# Patient Record
Sex: FEMALE | Race: WHITE | Employment: FULL TIME | URBAN - METROPOLITAN AREA
[De-identification: names, ages, dates, MRNs, and addresses within clinical notes are randomized per-mention and may not be internally consistent; named-entity substitution may affect disease eponyms.]

---

## 2017-01-02 ENCOUNTER — APPOINTMENT (OUTPATIENT)
Dept: LAB | Facility: HOSPITAL | Age: 72
End: 2017-01-02
Payer: COMMERCIAL

## 2017-01-02 DIAGNOSIS — D37.8: ICD-10-CM

## 2017-01-02 LAB
ATRIAL RATE: 84 BPM
BUN BLD-MCNC: 19 MG/DL (ref 8–20)
CALCIUM BLD-MCNC: 9.8 MG/DL (ref 8.3–10.3)
CHLORIDE: 103 MMOL/L (ref 101–111)
CO2: 28 MMOL/L (ref 22–32)
CREAT BLD-MCNC: 0.79 MG/DL (ref 0.55–1.02)
GLUCOSE BLD-MCNC: 156 MG/DL (ref 70–99)
P AXIS: 54 DEGREES
P-R INTERVAL: 140 MS
POTASSIUM SERPL-SCNC: 4.1 MMOL/L (ref 3.6–5.1)
Q-T INTERVAL: 374 MS
QRS DURATION: 112 MS
QTC CALCULATION (BEZET): 441 MS
R AXIS: -51 DEGREES
SODIUM SERPL-SCNC: 137 MMOL/L (ref 136–144)
T AXIS: 42 DEGREES
VENTRICULAR RATE: 84 BPM

## 2017-01-02 PROCEDURE — 93005 ELECTROCARDIOGRAM TRACING: CPT

## 2017-01-02 PROCEDURE — 93010 ELECTROCARDIOGRAM REPORT: CPT | Performed by: INTERNAL MEDICINE

## 2017-01-02 PROCEDURE — 80048 BASIC METABOLIC PNL TOTAL CA: CPT

## 2017-01-02 PROCEDURE — 36415 COLL VENOUS BLD VENIPUNCTURE: CPT

## 2017-01-18 ENCOUNTER — SURGERY (OUTPATIENT)
Age: 72
End: 2017-01-18

## 2017-01-18 ENCOUNTER — ANESTHESIA EVENT (OUTPATIENT)
Dept: SURGERY | Facility: HOSPITAL | Age: 72
End: 2017-01-18
Payer: COMMERCIAL

## 2017-01-18 ENCOUNTER — ANESTHESIA (OUTPATIENT)
Dept: SURGERY | Facility: HOSPITAL | Age: 72
End: 2017-01-18
Payer: COMMERCIAL

## 2017-01-18 NOTE — ANESTHESIA POSTPROCEDURE EVALUATION
[address] Patient Status:  Hospital Outpatient Surgery   Age/Gender 70year old female MRN [id number]   Location [address] [place] [address] Attending Castillo Blackwell MD   Hosp Day # 0 PCP Tereza Jones MD       An

## 2017-01-18 NOTE — ANESTHESIA PREPROCEDURE EVALUATION
PRE-OP EVALUATION    Patient Name: Ney Hernandez    Pre-op Diagnosis: Mass in rectum [K62.9]    Procedure(s):  TRANSANAL EXCISION OF ANAL CANAL MASS RIGHT YUDELKA LATERAL    Surgeon(s) and Role:     Mansi Hernandez MD - Primary    Pre-op vitals reviewed. psyllium (METAMUCIL SMOOTH TEXTURE) 28 % Oral Powd Pack Take 1 packet by mouth daily. Disp: 30 packet Rfl: 0   magnesium oxide (MAG-OX) 400 MG Oral Tab Take 1 tablet by mouth 2 (two) times daily.  Indications: Low Amount of Magnesium in the Blood Disp: 60 Location: [facility]    INJECTION, W/WO CONTRAST, DX/THERAPEUTIC SUBSTANCE, EPIDURAL/SUBARACHNOID; LUMBAR/SACRAL  3/22/2013    Comment Procedure: CAUDAL;  Surgeon: João Ramos MD;  Location: [address] [place] [address] Procedure: SI JOINT INJECTION;  Surgeon: Barbara Simmons MD;  Location: [facility] FOR PAIN MANAG[facility]    M-SEDAJ BY [facility] [address] .[facility] [address]  [address] [facility] 5+ YR N/A 6/9/2014    Comment Procedure: SI JOINT INJECTION;  Surgeon: Barbara Simmons MD;  Location: [address] [place] [address] Dr RILEY LUMBAR FACET INJECTION OR MEDIAL BRANCH NERVE BLOCK;  Surgeon: Franklin Berman MD;  Location: [address] NEEDLE PLACEMENT Right 7/14/2014    Comment Procedure: LUMBAR FACET INJECTION OR MEDIAL BRANCH NERVE BLOCK

## 2017-02-09 ENCOUNTER — OFFICE VISIT (OUTPATIENT)
Dept: SURGERY | Facility: CLINIC | Age: 72
End: 2017-02-09

## 2017-02-09 VITALS
SYSTOLIC BLOOD PRESSURE: 178 MMHG | TEMPERATURE: 98 F | DIASTOLIC BLOOD PRESSURE: 103 MMHG | HEIGHT: 62 IN | RESPIRATION RATE: 16 BRPM | HEART RATE: 80 BPM | WEIGHT: 213 LBS | BODY MASS INDEX: 39.2 KG/M2

## 2017-02-09 DIAGNOSIS — K64.4 EXTERNAL PROLAPSED HEMORRHOIDS: ICD-10-CM

## 2017-02-09 DIAGNOSIS — K64.2 PROLAPSED INTERNAL HEMORRHOIDS, GRADE 3: Primary | ICD-10-CM

## 2017-02-09 DIAGNOSIS — D37.8: ICD-10-CM

## 2017-02-09 PROCEDURE — 99024 POSTOP FOLLOW-UP VISIT: CPT | Performed by: PHYSICIAN ASSISTANT

## 2017-02-09 NOTE — PROGRESS NOTES
Post Operative Visit Note       Active Problems  1. Prolapsed internal hemorrhoids, grade 3    2. 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  External prolapsed hemorrhoids         Chief Complaint   Post-Op     History of Present Illness   This richard Post-op    • Thoracic or lumbosacral neuritis or radiculitis, unspecified 6/20/2013     Log Date: 06/26/2012    • Disturbance of skin sensation 6/20/2013     Log Date: 06/26/2012    • Degeneration of lumbar or lumbosacral intervertebral disc 6/20/2013 Comment Procedure: CAUDAL;  Surgeon: Maximino Vitale MD;  Location: [facility] FOR PAIN MANAGEMENT    INJECTION, W/WO CONTRAST, DX/THERAPEUTIC SUBSTANCE, EPIDURAL/SUBARACHNOID; LUMBAR/SACRAL  4/17/2013    Comment Procedure: CAUDAL;  Surgeon: Valeriy Flores PAIN MANAGEMENT    INJECT CAMI CARRASCO,ABHIJIT LOPEZH NERV Right 6/23/2014    Comment Procedure: LUMBAR FACET INJECTION OR MEDIAL BRANCH NERVE BLOCK;  Surgeon: Barbara Simmons MD;  Location: [facility] NERV DANILO,HENRIQUE JESSICA NERV Rig CEE;  Surgeon: Alexi Dennis MD;  Location: [facility]  1/18/17    Comment transanal excision of anal canal mass       The family history and social history have been reviewed by me today. History reviewed.  No Pack Take 1 packet by mouth daily. Disp: 30 packet Rfl: 0   magnesium oxide (MAG-OX) 400 MG Oral Tab Take 1 tablet by mouth 2 (two) times daily.  Indications: Low Amount of Magnesium in the Blood Disp: 60 tablet Rfl: 1   aspirin 81 MG Oral Tab Take 81 mg by distension and no mass. There is no tenderness. There is no rebound and no guarding.    Genitourinary:         Examination of the perineum does reveal a 1 inch long Vicryl suture, this was trimmed to the level of the skin, external examination reveals the a healing well there is no obvious signs of bleeding, there is no bruising, no erythema or drainage. Digital rectal exam and anoscopy were deferred due to patient discomfort.     I am happy to report that the patient's pathology revealed an inflammatory poly

## 2017-03-02 ENCOUNTER — OFFICE VISIT (OUTPATIENT)
Dept: SURGERY | Facility: CLINIC | Age: 72
End: 2017-03-02

## 2017-03-02 VITALS
RESPIRATION RATE: 16 BRPM | WEIGHT: 215 LBS | DIASTOLIC BLOOD PRESSURE: 86 MMHG | BODY MASS INDEX: 39.56 KG/M2 | HEIGHT: 62 IN | SYSTOLIC BLOOD PRESSURE: 154 MMHG | HEART RATE: 97 BPM

## 2017-03-02 DIAGNOSIS — K64.4 EXTERNAL PROLAPSED HEMORRHOIDS: Primary | ICD-10-CM

## 2017-03-02 DIAGNOSIS — K62.0 ANAL POLYP: ICD-10-CM

## 2017-03-02 PROCEDURE — 99024 POSTOP FOLLOW-UP VISIT: CPT | Performed by: COLON & RECTAL SURGERY

## 2017-03-02 RX ORDER — TRIAMTERENE AND HYDROCHLOROTHIAZIDE 37.5; 25 MG/1; MG/1
TABLET ORAL
Refills: 0 | COMMUNITY
Start: 2017-02-23 | End: 2019-09-09

## 2017-03-02 RX ORDER — LOSARTAN POTASSIUM 50 MG/1
TABLET ORAL
Refills: 0 | COMMUNITY
Start: 2017-02-09 | End: 2019-09-09

## 2017-03-02 NOTE — PATIENT INSTRUCTIONS
This patient underwent transanal excision of an anal canal mass on January 18, 2017. This was done for a prolapsing large polyp that was associated with rectal bleeding and anal pain.     I am happy to report final pathology reveals the polyp to be a benig

## 2017-03-02 NOTE — PROGRESS NOTES
Follow Up Visit Note       Active Problems      1. External prolapsed hemorrhoids    2.  Anal polyp          Chief Complaint   Patient presents with:  Anal Problem (GI): Further care and treatment of Transanal excision of anal canal mass,  right anterolater Allergies  [place] Seat is allergic to tramadol and vicodin. Past Medical / Surgical / Social / Family History    The past medical and past surgical history have been reviewed by me today.     Past Medical History   Diagnosis Date   • Diabetes ([facility] [address] SURGICAL HISTORY  1993    Comment breast biopsy    OTHER SURGICAL HISTORY      Comment back surgery    INJECTION, W/WO CONTRAST, DX/THERAPEUTIC SUBSTANCE, EPIDURAL/SUBARACHNOID; LUMBAR/SACRAL  3/1/2013    Comment Procedure: CAUDAL;  Surgeon: Camila Kwan Comment open surgical reduction fracture    LIGATION OF HEMORRHOID(S)      Comment x4    INJECTION PROC FOR SACROILIAC JOINT ARTHROGRAPHY &/OR ANESTHETIC/STEROID Right 5/16/2014    Comment Procedure: SI JOINT INJECTION;  Surgeon: Rome Abdi MD;  Beata Trevizo BRANCH NERVE BLOCK;  Surgeon: Ayad Castro MD;  Location: [facility] PAIN [facility]NA[facility]    INJECT NERV BLCK,OTHR PERIPH NERV Right 7/14/2014    Comment Procedure: LUMBAR FACET INJECTION OR MEDIAL BRANCH NERVE BLOCK;  Surgeon: Ayad Castro MD;  [facility] every 4 (four) hours as needed for Pain. Disp: 40 tablet Rfl: 0   Famotidine-Ca Carb-Mag Hydrox (PEPCID COMPLETE OR) Take by mouth daily. Disp:  Rfl:    Naproxen Sodium (ALEVE OR) Take 3 tablets by mouth daily.  Disp:  Rfl:    lidocaine 5 % External Patch P fever, chills, diaphoresis, fatigue and unexpected weight change. HENT: Negative for hearing loss, nosebleeds, sore throat and trouble swallowing. Respiratory: Negative for apnea, cough, shortness of breath and wheezing.     Cardiovascular: Negative fo constipation. The patient has no complaints at the operative site. The patient has no incisional complaints. The patient denies fever or chills. Anal exam including digital rectal exam reveals no palpable nodule or mass lesion.   She remains wit

## 2017-05-20 ENCOUNTER — HOSPITAL ENCOUNTER (OUTPATIENT)
Dept: MAMMOGRAPHY | Age: 72
Discharge: HOME | End: 2017-05-20
Attending: INTERNAL MEDICINE
Payer: COMMERCIAL

## 2017-05-20 ENCOUNTER — HOSPITAL ENCOUNTER (OUTPATIENT)
Dept: BONE DENSITY | Age: 72
Discharge: HOME | End: 2017-05-20
Attending: INTERNAL MEDICINE
Payer: COMMERCIAL

## 2017-05-20 DIAGNOSIS — Z12.31 SCREENING MAMMOGRAM, ENCOUNTER FOR: ICD-10-CM

## 2017-05-20 DIAGNOSIS — Z78.0 MENOPAUSE: ICD-10-CM

## 2017-05-20 PROCEDURE — 77080 DXA BONE DENSITY AXIAL: CPT | Performed by: INTERNAL MEDICINE

## 2017-05-20 PROCEDURE — 77067 SCR MAMMO BI INCL CAD: CPT | Performed by: INTERNAL MEDICINE

## 2017-06-26 ENCOUNTER — HOSPITAL ENCOUNTER (OUTPATIENT)
Dept: GENERAL RADIOLOGY | Facility: HOSPITAL | Age: 72
Discharge: HOME | End: 2017-06-26
Payer: COMMERCIAL

## 2017-06-26 DIAGNOSIS — M79.644 PAIN OF FINGER OF RIGHT HAND: ICD-10-CM

## 2017-06-26 PROCEDURE — 73140 X-RAY EXAM OF FINGER(S): CPT | Performed by: FAMILY MEDICINE

## 2017-07-03 ENCOUNTER — TELEPHONE (OUTPATIENT)
Dept: SURGERY | Facility: CLINIC | Age: 72
End: 2017-07-03

## 2017-07-03 NOTE — TELEPHONE ENCOUNTER
Pt scheduled fup appt 7/6 with Candance Hymen for lumbar back pain-injections;referral from Dr Rianna Lianres endorsed to front office folder in [address] [place] [address]

## 2017-07-03 NOTE — TELEPHONE ENCOUNTER
Referral received from Dr Nahomi Fierro for injections for lumbar back pain,pt had procedures with Dr Андрей Almendarez in 2015-never been seen in the office;referral is on Pain front office desk

## 2017-07-06 ENCOUNTER — OFFICE VISIT (OUTPATIENT)
Dept: SURGERY | Facility: CLINIC | Age: 72
End: 2017-07-06

## 2017-07-06 ENCOUNTER — TELEPHONE (OUTPATIENT)
Dept: SURGERY | Facility: CLINIC | Age: 72
End: 2017-07-06

## 2017-07-06 VITALS
DIASTOLIC BLOOD PRESSURE: 70 MMHG | RESPIRATION RATE: 16 BRPM | BODY MASS INDEX: 41.23 KG/M2 | HEIGHT: 60 IN | HEART RATE: 78 BPM | WEIGHT: 210 LBS | SYSTOLIC BLOOD PRESSURE: 130 MMHG

## 2017-07-06 DIAGNOSIS — M51.36 DDD (DEGENERATIVE DISC DISEASE), LUMBAR: ICD-10-CM

## 2017-07-06 DIAGNOSIS — M96.1 FAILED BACK SURGICAL SYNDROME: ICD-10-CM

## 2017-07-06 DIAGNOSIS — M54.16 LUMBAR RADICULITIS: ICD-10-CM

## 2017-07-06 DIAGNOSIS — M46.1 SACROILIITIS, NOT ELSEWHERE CLASSIFIED (HCC): Primary | ICD-10-CM

## 2017-07-06 DIAGNOSIS — M47.819 SPONDYLOSIS WITHOUT MYELOPATHY: ICD-10-CM

## 2017-07-06 PROCEDURE — 99215 OFFICE O/P EST HI 40 MIN: CPT | Performed by: PHYSICIAN ASSISTANT

## 2017-07-06 NOTE — PATIENT INSTRUCTIONS
Refill policies:    • Allow 2-3 business days for refills; controlled substances may take longer.   • Contact your pharmacy at least 5 days prior to running out of medication and have them send an electronic request or submit request through the [facility] have a procedure or additional testing performed. [facility]) will contact your insurance carrier to obtain pre-certification or prior authorization.     Unfortunately, ALONDRA has seen an increase in denial of payment even though the p procedure. If you require a refill of medications, please contact the office 48 hours prior to your procedure.   • If you have an implanted Spinal Cord or Peripheral Nerve Stimulator: Please remember to turn device off for procedure      Medication:   Numbe questions or concerns before or after your procedure at [phone]. If you are a diabetic, please increase the frequency of your glucose monitoring after the procedure as this may cause a temporary increase in your blood sugar.   Contact your primary car findings from the psychologist and make a determination if you are an appropriate candidate. One of the pain clinic nurses will notify you if you are deemed appropriate for the stimulator.  We will then start the process of obtaining prior authorization f current. The electrodes are placed under x-ray guidance with you lying on your belly. A local anesthetic is used to numb the skin and deeper tissues. An introducer needle is passed into the epidural space.  The electrodes are inserted through the introducer placement. What should I do after the procedure? This procedure is an outpatient procedure. You will need a ride home, plan to take it easy for a day or so after the procedure.  You will be able to do most activities but it is generally advised to [facility] [email or web address].        February 2016

## 2017-07-06 NOTE — PROGRESS NOTES
Name: Denise Sinha   : [birth year]   DOS: 2017     Pain Clinic Follow Up Visit:   Patient presents with: Follow - Up: low back pain       Denise Sinha is a 70year old female who presents for recheck of chronic low back pain.   Patient has had a Hallucinations      Current Outpatient Prescriptions:  Losartan Potassium 50 MG Oral Tab  Disp:  Rfl: 0   Triamterene-HCTZ 37.5-25 MG Oral Tab  Disp:  Rfl: 0   Famotidine-Ca Carb-Mag Hydrox (PEPCID COMPLETE OR) Take by mouth daily.  Disp: span intact.    Inspection:  Ambulates with difficulty uses 4 point rolling walker  Back: +++ tenderness to right SIJ, ++ over right LFJ, + over left LFJ and left SIJ , limited ROM   LE: Severe tenderness over right GTB and right ITB, 5/5 strength bilateral anterolisthesis of L2 with respect to L3   and L4 with respect to L5 as well as retrolisthesis of L1 with respect to L2.  Diffuse disc space desiccation with decreased disc height at the L1-2 and L2-3 level with irregularity of the L2-3 endplates most consi there is referral order for CHIQUI ZARAGOZA. 3. Information regarding SCS provided to patient- she will look into it. This would be the best option for patient to control her pain.  Trial and implantation d/w pt.   4. I recommended f/u with Dr Gorge Ordoñez and pt hilda

## 2017-07-11 ENCOUNTER — TELEPHONE (OUTPATIENT)
Dept: NEUROLOGY | Facility: CLINIC | Age: 72
End: 2017-07-11

## 2017-07-11 NOTE — TELEPHONE ENCOUNTER
Spoke to [employer] at [place], codes 71439 are valid and billable, no prior authorization or predetermination required.  Call reference: [id number] call time 18:07

## 2017-07-25 ENCOUNTER — APPOINTMENT (OUTPATIENT)
Dept: GENERAL RADIOLOGY | Facility: HOSPITAL | Age: 72
End: 2017-07-25
Attending: ANESTHESIOLOGY
Payer: COMMERCIAL

## 2017-07-25 ENCOUNTER — SURGERY (OUTPATIENT)
Age: 72
End: 2017-07-25

## 2017-07-25 ENCOUNTER — HOSPITAL ENCOUNTER (OUTPATIENT)
Facility: HOSPITAL | Age: 72
Setting detail: HOSPITAL OUTPATIENT SURGERY
Discharge: HOME | End: 2017-07-25
Attending: ANESTHESIOLOGY | Admitting: ANESTHESIOLOGY
Payer: COMMERCIAL

## 2017-07-25 VITALS
RESPIRATION RATE: 18 BRPM | TEMPERATURE: 98 F | OXYGEN SATURATION: 99 % | DIASTOLIC BLOOD PRESSURE: 60 MMHG | HEART RATE: 78 BPM | SYSTOLIC BLOOD PRESSURE: 125 MMHG

## 2017-07-25 DIAGNOSIS — M46.1 SACROILIITIS, NOT ELSEWHERE CLASSIFIED (HCC): ICD-10-CM

## 2017-07-25 DIAGNOSIS — M47.819 SPONDYLOSIS WITHOUT MYELOPATHY: ICD-10-CM

## 2017-07-25 LAB — GLUCOSE BLD-MCNC: 130 MG/DL (ref 65–99)

## 2017-07-25 PROCEDURE — 3E0U3BZ INTRODUCTION OF ANESTHETIC AGENT INTO JOINTS, PERCUTANEOUS APPROACH: ICD-10-PCS | Performed by: ANESTHESIOLOGY

## 2017-07-25 PROCEDURE — 77002 NEEDLE LOCALIZATION BY XRAY: CPT | Performed by: ANESTHESIOLOGY

## 2017-07-25 PROCEDURE — 99152 MOD SED SAME PHYS/QHP 5/>YRS: CPT | Performed by: ANESTHESIOLOGY

## 2017-07-25 PROCEDURE — 3E0U33Z INTRODUCTION OF ANTI-INFLAMMATORY INTO JOINTS, PERCUTANEOUS APPROACH: ICD-10-PCS | Performed by: ANESTHESIOLOGY

## 2017-07-25 PROCEDURE — 82962 GLUCOSE BLOOD TEST: CPT

## 2017-07-25 RX ORDER — LIDOCAINE HYDROCHLORIDE 10 MG/ML
INJECTION, SOLUTION EPIDURAL; INFILTRATION; INTRACAUDAL; PERINEURAL AS NEEDED
Status: DISCONTINUED | OUTPATIENT
Start: 2017-07-25 | End: 2017-07-25 | Stop reason: HOSPADM

## 2017-07-25 RX ORDER — MIDAZOLAM HYDROCHLORIDE 1 MG/ML
INJECTION INTRAMUSCULAR; INTRAVENOUS AS NEEDED
Status: DISCONTINUED | OUTPATIENT
Start: 2017-07-25 | End: 2017-07-25 | Stop reason: HOSPADM

## 2017-07-25 RX ORDER — METHYLPREDNISOLONE ACETATE 40 MG/ML
INJECTION, SUSPENSION INTRA-ARTICULAR; INTRALESIONAL; INTRAMUSCULAR; SOFT TISSUE AS NEEDED
Status: DISCONTINUED | OUTPATIENT
Start: 2017-07-25 | End: 2017-07-25 | Stop reason: HOSPADM

## 2017-07-25 RX ORDER — SODIUM CHLORIDE, SODIUM LACTATE, POTASSIUM CHLORIDE, CALCIUM CHLORIDE 600; 310; 30; 20 MG/100ML; MG/100ML; MG/100ML; MG/100ML
100 INJECTION, SOLUTION INTRAVENOUS CONTINUOUS
Status: DISCONTINUED | OUTPATIENT
Start: 2017-07-25 | End: 2017-07-25

## 2017-07-25 NOTE — OPERATIVE REPORT
[facility]  Operative Report  2017     Tati Oneil Patient Status:  Hospital Outpatient Surgery    1945 MRN [id number]   Location [address] [place] [address] Attending Aloysius Klinefelter, MD   Hosp Day # 0 PCP None Pc joint were overlapped. A 22-gauge, Quincke spinal needle was advanced into the middle portion of the corresponding sacroiliac joint.  After the needle  positions were confirmed by AP and lateral views of fluoroscopy, 1 cc Omnipaque-240 was injected into th

## 2017-07-25 NOTE — H&P
History & Physical Examination    Patient Name: Jan Mcgowan  MRN: [id number]  CSN: [id number]  YOB: 1945    Pre-Operative Diagnosis:  [facility] elsewhere [facility] [M46.1]  Spondylosis without myelopathy [M19.90]    Present Taking   SYNTHROID 50 MCG OR TABS 1 TABLET DAILY Disp:  Rfl:  Taking   METFORMIN  MG (MOD) OR TB24 2 tablets at night Disp:  Rfl:  Taking       Current Facility-Administered Medications:  lactated ringers infusion 100 mL/hr Intravenous Continuous Date: 10/10/2011    • Unspecified disorder of thyroid    • Valvular disease     MVP   • Visual impairment     glasses     Past Surgical History:  2010: COLONOSCOPY  1975: D & C  3/22/2013: EPIDUROGRAPHY, RADIOLOGICAL S & I      Comment: Procedure: CAUDAL; Location: [facility]  7/14/2014: INJECT NERV BLCK,OTHR PERIPH NERV Right      Comment: Procedure: LUMBAR FACET INJECTION OR MEDIAL                BRANCH NERVE BLOCK;  Surgeon: Mercedes Roberts MD;  Location: [address] [place] [address]  OF HEMORRHOID(S)      Comment: x4  3/1/2013: NADEEN BY  PHYS [facility] 5+ YR      Comment: Procedure: CAUDAL;  Surgeon: Joelle Villa MD;  Location: [address] [place] [address] MANAGEMENT  3/22/2013: NADEEN BY  PHYS [address] [facility] [address]  [address] 5+ YR 10. 00 Years     Quit date: 1/1/1974    Smokeless tobacco: Never Used    Alcohol use No    Comment: beer rarely/ once a year     There were no vitals taken for this visit.   SYSTEM Check if Review is Normal Check if Physical Exam is Normal If not normal, ple

## 2017-08-31 ENCOUNTER — OFFICE VISIT (OUTPATIENT)
Dept: SURGERY | Facility: CLINIC | Age: 72
End: 2017-08-31

## 2017-08-31 VITALS
RESPIRATION RATE: 20 BRPM | DIASTOLIC BLOOD PRESSURE: 80 MMHG | BODY MASS INDEX: 40.05 KG/M2 | OXYGEN SATURATION: 98 % | WEIGHT: 204 LBS | HEIGHT: 60 IN | SYSTOLIC BLOOD PRESSURE: 142 MMHG | HEART RATE: 63 BPM

## 2017-08-31 DIAGNOSIS — G57.71 COMPLEX REGIONAL PAIN SYNDROME TYPE 2 OF RIGHT LOWER EXTREMITY: ICD-10-CM

## 2017-08-31 DIAGNOSIS — M47.819 SPONDYLOSIS WITHOUT MYELOPATHY: ICD-10-CM

## 2017-08-31 DIAGNOSIS — M46.1 SACROILIITIS, NOT ELSEWHERE CLASSIFIED (HCC): Primary | ICD-10-CM

## 2017-08-31 PROCEDURE — 99213 OFFICE O/P EST LOW 20 MIN: CPT | Performed by: PHYSICIAN ASSISTANT

## 2017-08-31 RX ORDER — PREGABALIN 25 MG/1
25 CAPSULE ORAL 2 TIMES DAILY
Qty: 60 CAPSULE | Refills: 0 | Status: SHIPPED | OUTPATIENT
Start: 2017-08-31 | End: 2017-10-02

## 2017-08-31 NOTE — PATIENT INSTRUCTIONS
Refill policies:    • Allow 2-3 business days for refills; controlled substances may take longer.   • Contact your pharmacy at least 5 days prior to running out of medication and have them send an electronic request or submit request through the [facility] have a procedure or additional testing performed. [facility]) will contact your insurance carrier to obtain pre-certification or prior authorization.     Unfortunately, ALONDRA has seen an increase in denial of payment even though the p antiseptic solution and then the procedure is carried out. The skin is numbed with a local anesthetic. Then X-ray or fluoroscopy is used to guide placement of the introducer needles.  Since nerves cannot actually be seen on x-ray, the introducer needles are which makes the procedure easier to tolerate. It is necessary for you to be awake enough to communicate easily with the physician during the procedure.  However, some patients receive enough sedation that they have amnesia and cannot always remember parts o speaking, this procedure is safe. However, with any procedure there are risks, side effects and the possibility of complications. The risks and complications are dependent upon the sites that are ablated.  Since the introducer needles have to go through ski

## 2017-08-31 NOTE — PROGRESS NOTES
Name: Rowena Girard   :    DOS: 2017     Pain Clinic Follow Up Visit:   Patient presents with: Follow - Up: plan of care      Rowena Girard is a 67year old female who presents for recheck of chronic low back pain.  Pt is S/P CHIQUI gibbs Oral Cap Take 400 Units by mouth daily. Disp:  Rfl:    Cholecalciferol (VITAMIN D) 1000 UNITS Oral Cap Take 1 capsule by mouth daily. Disp:  Rfl:    Cyanocobalamin (VITAMIN B 12 OR) Take 1,000 Units by mouth daily.  Disp:  Rfl:    Multiple Vitamin (MULTI-VI explained to pt that this is diagnostic injection and it's safe to say majority of her pain is coming from the SIJ and she should consider getting RFA done for longer lasting relief.    2. Pt declined at this time and wants to go back to see Dr Keven Gibbons for more

## 2017-10-02 RX ORDER — PREGABALIN 25 MG/1
25 CAPSULE ORAL 2 TIMES DAILY
Qty: 60 CAPSULE | Refills: 0 | Status: SHIPPED
Start: 2017-10-02 | End: 2017-11-03

## 2017-10-02 NOTE — TELEPHONE ENCOUNTER
Medication: Lyrica 25mg    Date of last refill: 8/31/2017  [place] reviewed 3/92/2726    Last office visit: 8/31/2017  Due back to clinic per last office note:  Return for 6 wks post procedure.   Date next office visit sc

## 2017-10-14 ENCOUNTER — HOSPITAL ENCOUNTER (OUTPATIENT)
Dept: MRI IMAGING | Age: 72
Discharge: HOME | End: 2017-10-14
Attending: ORTHOPAEDIC SURGERY
Payer: COMMERCIAL

## 2017-10-14 DIAGNOSIS — M54.50 BACK PAIN AT L4-L5 LEVEL: ICD-10-CM

## 2017-10-14 DIAGNOSIS — M54.50 LOW BACK PAIN: ICD-10-CM

## 2017-10-14 DIAGNOSIS — M54.50 LUMBAGO: ICD-10-CM

## 2017-10-14 PROCEDURE — A9575 INJ GADOTERATE MEGLUMI 0.1ML: HCPCS | Performed by: RADIOLOGY

## 2017-10-14 PROCEDURE — 72158 MRI LUMBAR SPINE W/O & W/DYE: CPT | Performed by: ORTHOPAEDIC SURGERY

## 2017-10-16 ENCOUNTER — OFFICE VISIT (OUTPATIENT)
Dept: ELECTROPHYSIOLOGY | Facility: HOSPITAL | Age: 72
End: 2017-10-16
Attending: ORTHOPAEDIC SURGERY
Payer: COMMERCIAL

## 2017-10-16 DIAGNOSIS — R20.2 PARESTHESIAS IN LEFT HAND: ICD-10-CM

## 2017-10-16 PROCEDURE — 95886 MUSC TEST DONE W/N TEST COMP: CPT | Performed by: OTHER

## 2017-10-16 PROCEDURE — 95910 NRV CNDJ TEST 7-8 STUDIES: CPT | Performed by: OTHER

## 2017-10-17 NOTE — PROCEDURES
[facility] [facility], [address] [place] [address]  [phone]            Patient: Carissa Frye Sex: Female  Patient ID: [id number] YOB: 1945      Visit Date: 10/16/2017 08:04  Patient Age On Visit Sunday B.Elbow ADM 6.09  9.1  94.2 ?115   B. Elbow - Wrist 17 67 ?49      A. Elbow ADM 8.91  9.0  99.7 ?115   A. Elbow - B. Elbow 15 53 ?52             F  Wave      Nerve Fmin    ms    Left Right Ref.    Median - APB 30.00 26.35 ?32.00   Ulnar - ADM 27.81  ?32.00

## 2017-10-30 ENCOUNTER — TELEPHONE (OUTPATIENT)
Dept: SURGERY | Facility: CLINIC | Age: 72
End: 2017-10-30

## 2017-11-03 ENCOUNTER — TELEPHONE (OUTPATIENT)
Dept: SURGERY | Facility: CLINIC | Age: 72
End: 2017-11-03

## 2017-11-03 ENCOUNTER — OFFICE VISIT (OUTPATIENT)
Dept: SURGERY | Facility: CLINIC | Age: 72
End: 2017-11-03

## 2017-11-03 VITALS
DIASTOLIC BLOOD PRESSURE: 68 MMHG | HEIGHT: 60 IN | RESPIRATION RATE: 18 BRPM | OXYGEN SATURATION: 99 % | SYSTOLIC BLOOD PRESSURE: 122 MMHG | WEIGHT: 210 LBS | BODY MASS INDEX: 41.23 KG/M2 | HEART RATE: 85 BPM

## 2017-11-03 DIAGNOSIS — M96.1 FAILED BACK SURGICAL SYNDROME: Primary | ICD-10-CM

## 2017-11-03 DIAGNOSIS — M54.16 LUMBAR RADICULITIS: ICD-10-CM

## 2017-11-03 DIAGNOSIS — G57.71 COMPLEX REGIONAL PAIN SYNDROME TYPE 2 OF RIGHT LOWER EXTREMITY: ICD-10-CM

## 2017-11-03 DIAGNOSIS — M47.16 OSTEOARTHRITIS OF LUMBAR SPINE WITH MYELOPATHY: ICD-10-CM

## 2017-11-03 DIAGNOSIS — M46.1 SACROILIITIS, NOT ELSEWHERE CLASSIFIED (HCC): ICD-10-CM

## 2017-11-03 PROCEDURE — 99215 OFFICE O/P EST HI 40 MIN: CPT | Performed by: PHYSICIAN ASSISTANT

## 2017-11-03 RX ORDER — PREGABALIN 25 MG/1
25 CAPSULE ORAL 2 TIMES DAILY
Qty: 60 CAPSULE | Refills: 0 | Status: SHIPPED
Start: 2017-11-03 | End: 2019-08-16

## 2017-11-03 NOTE — PROGRESS NOTES
Name: Fadi Cancino   :    DOS: 11/3/2017     Pain Clinic Follow Up Visit:   Patient presents with:   Follow - Up: plan of care      Fadi Cancino is a 67year old female who presents for recheck of chronic low back pain and CRPS to R LE sec HPI.      Tramadol                Nausea and vomiting  Vicodin [Hydrocodon*    Nausea and vomiting, Dizziness,                            Hallucinations      Current Outpatient Prescriptions:  pregabalin 25 MG Oral Cap Take 1 capsule (25 mg total) by mouth 18   Ht 60\"   Wt 210 lb   SpO2 99%   BMI 41.01 kg/m²   General:  Patient is a(n) 67year old year old female in no acute distress. Neurologic[de-identified] WNL-Orientation to time, place and person, normal mood & affect, concentration & attention span intact.    Insp

## 2017-11-03 NOTE — PATIENT INSTRUCTIONS
Refill policies:    • Allow 2-3 business days for refills; controlled substances may take longer.   • Contact your pharmacy at least 5 days prior to running out of medication and have them send an electronic request or submit request through the [facility] have a procedure or additional testing performed. [facility]) will contact your insurance carrier to obtain pre-certification or prior authorization.     Unfortunately, ALONDRA has seen an increase in denial of payment even though the p procedure. If you require a refill of medications, please contact the office 48 hours prior to your procedure.   • If you have an implanted Spinal Cord or Peripheral Nerve Stimulator: Please remember to turn device off for procedure      Medication:   Numbe questions or concerns before or after your procedure at [phone]. If you are a diabetic, please increase the frequency of your glucose monitoring after the procedure as this may cause a temporary increase in your blood sugar.   Contact your primary car

## 2017-11-07 ENCOUNTER — MED REC SCAN ONLY (OUTPATIENT)
Dept: SURGERY | Facility: CLINIC | Age: 72
End: 2017-11-07

## 2018-01-02 ENCOUNTER — TELEPHONE (OUTPATIENT)
Dept: SURGERY | Facility: CLINIC | Age: 73
End: 2018-01-02

## 2018-01-02 NOTE — TELEPHONE ENCOUNTER
Spoke to Kevin at [facility] ([phone]) is valid and billable, no precertification or predetermination required.  Call reference #[id number], time on call: 6:44

## 2018-01-02 NOTE — TELEPHONE ENCOUNTER
LM confirming upcoming procedure date 1-4-18 and arrival time 7:00 and review of instructions. Encouraged pt to call office back with any questions. Office number provided.        [address]  PRE-PROCEDURE INSTRUCTIONS WITH IV SEDATION ? 81mg 24 hours  ? Greater than 81 mg (325mg) 7 days  ? Coumadin Procedure may be cancelled if INR is elevated. ? Epidural ____ - 7 days  ? Others 5 days  ? Excedrin (with aspirin) 7 days  ? Plavix (Clopidogrel)  ? Epidural ____ - 10 days  ?  Others 7 day

## 2018-01-03 ENCOUNTER — TELEPHONE (OUTPATIENT)
Dept: SURGERY | Facility: CLINIC | Age: 73
End: 2018-01-03

## 2018-04-23 ENCOUNTER — OFFICE VISIT (OUTPATIENT)
Dept: SURGERY | Facility: CLINIC | Age: 73
End: 2018-04-23

## 2018-04-23 VITALS
HEIGHT: 60 IN | BODY MASS INDEX: 41.23 KG/M2 | SYSTOLIC BLOOD PRESSURE: 148 MMHG | WEIGHT: 210 LBS | DIASTOLIC BLOOD PRESSURE: 90 MMHG | HEART RATE: 85 BPM

## 2018-04-23 DIAGNOSIS — M96.1 FAILED BACK SURGICAL SYNDROME: Primary | ICD-10-CM

## 2018-04-23 DIAGNOSIS — G90.521 COMPLEX REGIONAL PAIN SYNDROME I OF RIGHT LOWER LIMB: ICD-10-CM

## 2018-04-23 DIAGNOSIS — M54.16 LUMBAR RADICULOPATHY: ICD-10-CM

## 2018-04-23 PROCEDURE — 99214 OFFICE O/P EST MOD 30 MIN: CPT | Performed by: NURSE PRACTITIONER

## 2018-04-23 RX ORDER — DILTIAZEM HYDROCHLORIDE 240 MG/1
240 CAPSULE, EXTENDED RELEASE ORAL
COMMUNITY
End: 2019-09-09

## 2018-04-23 NOTE — PROGRESS NOTES
Name: Jodi Garnett   : [birth year]   DOS: 2018     Pain Clinic Follow Up Visit:   Jodi Garnett is a 67year old female who presents for recheck of her chronic low back pain and complex regional pain syndrome right thigh since low back surgery. Disp: 60 capsule Rfl: 0   Losartan Potassium 50 MG Oral Tab  Disp:  Rfl: 0   Triamterene-HCTZ 37.5-25 MG Oral Tab  Disp:  Rfl: 0   Famotidine-Ca Carb-Mag Hydrox (PEPCID COMPLETE OR) Take by mouth daily.  Disp:  Rfl:    Naproxen Sodium (ALEVE OR) Take 3 tabl AND WITHOUT CONTRAST     COMPARISON:  SARINA , CT SPINE LUMBAR (CPT=72131), 6/05/2015, 19:37. RUCHI, MRI SPINE LUMBAR (W+WO) (CPT=72158), 6/10/2015, 19:17.      INDICATIONS:  M54.5 Low back pain M54.5 Low back pain M54.5 Low back pain     TECHNIQUE: involving L1 to ambulate. CORD/CAUDA EQUINA:  Normal caliber, contour, and signal intensity.       =====  CONCLUSION:  Postsurgical changes lumbar spine with L1-L5 laminectomies and L4-L5 and posterior mil and pedicle screw fixation appears stable. above.  I have ordered MRI thoracic spine for further evaluation prior to spinal cord stimulator trial.  We will contact her once we have reviewed MRI results if Dr. Sandra Hernandez recommends that she proceed with spinal cord stimulator trial process.   She agrees wi

## 2018-04-23 NOTE — PATIENT INSTRUCTIONS
Refill policies:    • Allow 2-3 business days for refills; controlled substances may take longer.   • Contact your pharmacy at least 5 days prior to running out of medication and have them send an electronic request or submit request through the “request re entire amount billed. Precertification and Prior Authorizations: If your physician has recommended that you have a procedure or additional testing performed.   [facility]) will contact your insurance carrier to obtain pre-certi

## 2018-04-27 PROBLEM — M96.1 FAILED BACK SURGICAL SYNDROME: Status: ACTIVE | Noted: 2018-04-27

## 2018-08-03 ENCOUNTER — HOSPITAL ENCOUNTER (OUTPATIENT)
Dept: MAMMOGRAPHY | Age: 73
Discharge: HOME | End: 2018-08-03
Attending: INTERNAL MEDICINE
Payer: COMMERCIAL

## 2018-08-03 DIAGNOSIS — Z12.31 SCREENING MAMMOGRAM, ENCOUNTER FOR: ICD-10-CM

## 2018-08-03 PROCEDURE — 77067 SCR MAMMO BI INCL CAD: CPT | Performed by: INTERNAL MEDICINE

## 2018-08-03 PROCEDURE — 77063 BREAST TOMOSYNTHESIS BI: CPT | Performed by: INTERNAL MEDICINE

## 2019-01-01 ENCOUNTER — EXTERNAL RECORD (OUTPATIENT)
Dept: HEALTH INFORMATION MANAGEMENT | Facility: OTHER | Age: 74
End: 2019-01-01

## 2019-01-28 ENCOUNTER — HOSPITAL ENCOUNTER (EMERGENCY)
Facility: HOSPITAL | Age: 74
Discharge: HOME | End: 2019-01-28
Attending: EMERGENCY MEDICINE
Payer: COMMERCIAL

## 2019-01-28 VITALS
OXYGEN SATURATION: 98 % | SYSTOLIC BLOOD PRESSURE: 174 MMHG | HEIGHT: 60 IN | DIASTOLIC BLOOD PRESSURE: 86 MMHG | BODY MASS INDEX: 41.23 KG/M2 | WEIGHT: 210 LBS | HEART RATE: 100 BPM | RESPIRATION RATE: 20 BRPM | TEMPERATURE: 98 F

## 2019-01-28 DIAGNOSIS — M54.50 ACUTE EXACERBATION OF CHRONIC LOW BACK PAIN: Primary | ICD-10-CM

## 2019-01-28 DIAGNOSIS — G89.29 ACUTE EXACERBATION OF CHRONIC LOW BACK PAIN: Primary | ICD-10-CM

## 2019-01-28 PROCEDURE — 96372 THER/PROPH/DIAG INJ SC/IM: CPT

## 2019-01-28 PROCEDURE — 99284 EMERGENCY DEPT VISIT MOD MDM: CPT

## 2019-01-28 RX ORDER — METHYLPREDNISOLONE 4 MG/1
TABLET ORAL
Qty: 1 PACKAGE | Refills: 0 | Status: SHIPPED | OUTPATIENT
Start: 2019-01-28 | End: 2019-02-02

## 2019-01-28 RX ORDER — DEXAMETHASONE SODIUM PHOSPHATE 4 MG/ML
10 VIAL (ML) INJECTION ONCE
Status: COMPLETED | OUTPATIENT
Start: 2019-01-28 | End: 2019-01-28

## 2019-01-28 RX ORDER — KETOROLAC TROMETHAMINE 30 MG/ML
60 INJECTION, SOLUTION INTRAMUSCULAR; INTRAVENOUS ONCE
Status: COMPLETED | OUTPATIENT
Start: 2019-01-28 | End: 2019-01-28

## 2019-01-28 NOTE — ED INITIAL ASSESSMENT (HPI)
69 yo F came today for back pain. States she has chronic back pain 4/10 on pain scale. At 5pm today, she was bending over to put air on tire. Pain got worst. Pain right back radiates to right hip. 10/10 on scale. Denies any numbness or tingling on feet.  Rena Sine

## 2019-01-28 NOTE — ED PROVIDER NOTES
Patient Seen in: [facility] Emergency Department    History   Patient presents with:  Back Pain (musculoskeletal)    Stated Complaint: back pain after bending to put air in her tire    HPI    Patient 66-year-old woman with chronic back pain complain without mention of myelopathy 3/1/2013   • Thoracic or lumbosacral neuritis or radiculitis, unspecified 6/20/2013    Log Date: 06/26/2012    • Type II or unspecified type diabetes mellitus without mention of complication, not stated as uncontrolled    • Un OTHER SURGICAL HISTORY  2011    open surgical reduction fracture   • SACROILIAC JOINT INJECTION RIGHT OR LEFT Right 7/25/2017    Performed by Lili Teresa MD at [facility]   • [facility] LEFT Right 1/8/2015    Performed by Ming Hernandez Normocephalic and atraumatic. Eyes: Conjunctivae are normal. No scleral icterus. Neck: Normal range of motion. Neck supple. Cardiovascular: Normal rate and intact distal pulses. Pulmonary/Chest: Effort normal. No respiratory distress.    Abdominal:

## 2019-02-08 ENCOUNTER — OFFICE VISIT (OUTPATIENT)
Dept: PAIN CLINIC | Facility: CLINIC | Age: 74
End: 2019-02-08
Payer: COMMERCIAL

## 2019-02-08 VITALS — DIASTOLIC BLOOD PRESSURE: 82 MMHG | HEART RATE: 68 BPM | SYSTOLIC BLOOD PRESSURE: 148 MMHG | OXYGEN SATURATION: 97 %

## 2019-02-08 DIAGNOSIS — M46.1 SACROILIITIS (HCC): ICD-10-CM

## 2019-02-08 DIAGNOSIS — M54.16 LUMBAR RADICULITIS: Primary | ICD-10-CM

## 2019-02-08 PROCEDURE — 99214 OFFICE O/P EST MOD 30 MIN: CPT | Performed by: NURSE PRACTITIONER

## 2019-02-08 NOTE — PROGRESS NOTES
Name: David Chapman   :    DOS: 2019     Pain Clinic Follow Up Visit:   David Chapman is a 68year old female who presents for recheck of her chronic low back pain.  On 19 patient was trying to put air in her tire when she felt an \ (VITAMIN B 12 OR) Take 1,000 Units by mouth daily. Disp:  Rfl:    Multiple Vitamin (MULTI-VITAMIN) Oral Tab Take 1 tablet by mouth daily.  Disp:  Rfl:    acetaminophen (TYLENOL EXTRA STRENGTH) 500 MG Oral Tab Take 2 tablets by mouth every 6 (six) hours as n ordered in this encounter       Radiology orders and consultations:MRI SPINE LUMBAR (CPT=72148)    The patient indicates understanding of these issues and agrees to the plan. Return if symptoms worsen or fail to improve.     Eric Posada, APRN, 2/8/2019, 9

## 2019-03-02 ENCOUNTER — HOSPITAL ENCOUNTER (OUTPATIENT)
Dept: MRI IMAGING | Age: 74
Discharge: HOME | End: 2019-03-02
Attending: NURSE PRACTITIONER
Payer: COMMERCIAL

## 2019-03-02 DIAGNOSIS — M54.16 LUMBAR RADICULITIS: ICD-10-CM

## 2019-03-02 PROCEDURE — 72148 MRI LUMBAR SPINE W/O DYE: CPT | Performed by: NURSE PRACTITIONER

## 2019-03-05 ENCOUNTER — TELEPHONE (OUTPATIENT)
Dept: PAIN CLINIC | Facility: CLINIC | Age: 74
End: 2019-03-05

## 2019-03-05 DIAGNOSIS — M54.16 LUMBAR RADICULITIS: ICD-10-CM

## 2019-03-05 DIAGNOSIS — M47.817 LUMBOSACRAL SPONDYLOSIS WITHOUT MYELOPATHY: ICD-10-CM

## 2019-03-05 DIAGNOSIS — M46.1 SACROILIITIS (HCC): Primary | ICD-10-CM

## 2019-03-05 NOTE — TELEPHONE ENCOUNTER
Called patient and left a message to call back. Please relay the message when she calls back. MRI noted with disc extrusion at T12-L1 and Dr. Hiwot Avery recommends a right SIJI followed by a right TLESI at L1-2 if needed.

## 2019-03-05 NOTE — TELEPHONE ENCOUNTER
Spoke to pt to [employer], JANET's msg below. Pt verbalized understanding and stated she would like to move forward w/ recommended injections.

## 2019-03-05 NOTE — TELEPHONE ENCOUNTER
Medical clearance needed- No    Pt's MRI results reviewed and Dr. Jerris Cranker recommended for Right SIJI followed by Right TLESI L1-L2. Please begin PA process for procedure(s).      SIJI  Laterality: Right  Level(s): N/A    TLESI  Laterality: Right  Levels: L1-L

## 2019-03-06 NOTE — TELEPHONE ENCOUNTER
[facility] does not come [facility] with Nilda, no Pa needed for [address]  Call reference number [id number]  Call time 10:01    Patient can be scheduled out

## 2019-03-06 NOTE — TELEPHONE ENCOUNTER
Called patient and scheduled for:    Appointment Date:  3/19/19  Procedure Time:  085 am  Check-in Time:  745 am    Appointment Date:  4/2/19  Procedure Time:  845 am  Check-in Time:  745 am    Pre-procedure instructions including fasting (except blood pre

## 2019-03-14 ENCOUNTER — TELEPHONE (OUTPATIENT)
Dept: PAIN CLINIC | Facility: CLINIC | Age: 74
End: 2019-03-14

## 2019-03-14 NOTE — TELEPHONE ENCOUNTER
Spoke to patient, confirmed procedure date of 3/19/19 and to be checked in at outpatient registration at 7:45 am. Patient called pre-procedure line and understood instructionsPatient instructed to call office if there are additional questions after listeni

## 2019-03-19 ENCOUNTER — APPOINTMENT (OUTPATIENT)
Dept: GENERAL RADIOLOGY | Facility: HOSPITAL | Age: 74
End: 2019-03-19
Attending: ANESTHESIOLOGY
Payer: COMMERCIAL

## 2019-03-19 ENCOUNTER — HOSPITAL ENCOUNTER (OUTPATIENT)
Facility: HOSPITAL | Age: 74
Setting detail: HOSPITAL OUTPATIENT SURGERY
Discharge: HOME | End: 2019-03-19
Attending: ANESTHESIOLOGY | Admitting: ANESTHESIOLOGY
Payer: COMMERCIAL

## 2019-03-19 VITALS
RESPIRATION RATE: 16 BRPM | OXYGEN SATURATION: 94 % | DIASTOLIC BLOOD PRESSURE: 72 MMHG | SYSTOLIC BLOOD PRESSURE: 176 MMHG | HEART RATE: 59 BPM | TEMPERATURE: 97 F

## 2019-03-19 DIAGNOSIS — M47.817 LUMBOSACRAL SPONDYLOSIS WITHOUT MYELOPATHY: ICD-10-CM

## 2019-03-19 DIAGNOSIS — M46.1 SACROILIITIS (HCC): ICD-10-CM

## 2019-03-19 LAB — GLUCOSE BLD-MCNC: 173 MG/DL (ref 70–99)

## 2019-03-19 PROCEDURE — 82962 GLUCOSE BLOOD TEST: CPT

## 2019-03-19 PROCEDURE — 3E0U33Z INTRODUCTION OF ANTI-INFLAMMATORY INTO JOINTS, PERCUTANEOUS APPROACH: ICD-10-PCS | Performed by: ANESTHESIOLOGY

## 2019-03-19 PROCEDURE — 3E0U3KZ INTRODUCTION OF OTHER DIAGNOSTIC SUBSTANCE INTO JOINTS, PERCUTANEOUS APPROACH: ICD-10-PCS | Performed by: ANESTHESIOLOGY

## 2019-03-19 PROCEDURE — 3E0U3BZ INTRODUCTION OF ANESTHETIC AGENT INTO JOINTS, PERCUTANEOUS APPROACH: ICD-10-PCS | Performed by: ANESTHESIOLOGY

## 2019-03-19 RX ORDER — ONDANSETRON 2 MG/ML
4 INJECTION INTRAMUSCULAR; INTRAVENOUS ONCE AS NEEDED
Status: DISCONTINUED | OUTPATIENT
Start: 2019-03-19 | End: 2019-03-19

## 2019-03-19 RX ORDER — SODIUM CHLORIDE, SODIUM LACTATE, POTASSIUM CHLORIDE, CALCIUM CHLORIDE 600; 310; 30; 20 MG/100ML; MG/100ML; MG/100ML; MG/100ML
100 INJECTION, SOLUTION INTRAVENOUS CONTINUOUS
Status: DISCONTINUED | OUTPATIENT
Start: 2019-03-19 | End: 2019-03-19

## 2019-03-19 RX ORDER — OMEGA-3/DHA/EPA/FISH OIL 910-1400MG
CAPSULE ORAL
COMMUNITY

## 2019-03-19 RX ORDER — DEXTROSE MONOHYDRATE 25 G/50ML
50 INJECTION, SOLUTION INTRAVENOUS
Status: DISCONTINUED | OUTPATIENT
Start: 2019-03-19 | End: 2019-03-19

## 2019-03-19 RX ORDER — DIPHENHYDRAMINE HYDROCHLORIDE 50 MG/ML
50 INJECTION INTRAMUSCULAR; INTRAVENOUS ONCE AS NEEDED
Status: DISCONTINUED | OUTPATIENT
Start: 2019-03-19 | End: 2019-03-19

## 2019-03-19 RX ORDER — LIDOCAINE HYDROCHLORIDE 10 MG/ML
INJECTION, SOLUTION EPIDURAL; INFILTRATION; INTRACAUDAL; PERINEURAL AS NEEDED
Status: DISCONTINUED | OUTPATIENT
Start: 2019-03-19 | End: 2019-03-19

## 2019-03-19 RX ORDER — INSULIN ASPART 100 [IU]/ML
3 INJECTION, SOLUTION INTRAVENOUS; SUBCUTANEOUS ONCE
Status: DISCONTINUED | OUTPATIENT
Start: 2019-03-19 | End: 2019-03-19

## 2019-03-19 RX ORDER — METHYLPREDNISOLONE ACETATE 40 MG/ML
INJECTION, SUSPENSION INTRA-ARTICULAR; INTRALESIONAL; INTRAMUSCULAR; SOFT TISSUE AS NEEDED
Status: DISCONTINUED | OUTPATIENT
Start: 2019-03-19 | End: 2019-03-19

## 2019-03-19 NOTE — OPERATIVE REPORT
[facility]  Operative Report  3/19/2019     Siri De Los Santos Patient Status:  Hospital Outpatient Surgery    1945 MRN [id number]   [facility] ENDOSCOPY Attending No att. providers found   Hosp Day # 0 PCP MD Shreya Sanches nice sacroiliac joint arthrogram revealed. After that   methylprednisolone 80 mg with 2 mL of 1% lidocaine was injected. The needle was flushed with 1 mL of 1% lidocaine. The needle was removed with stylet in situ.   The patient tolerated the procedure ve

## 2019-03-19 NOTE — H&P
History & Physical Examination    Patient Name: Gordo Read  MRN: [id number]  CSN: [id number]  YOB: 1945    Pre-Operative Diagnosis:  Sacroiliitis ([facility] [M46.1]  Lumbosacral spondylosis without myelopathy [M47.817]    Present Illness: A 73 Take 1 tablet by mouth 2 (two) times daily. Indications: Low Amount of Magnesium in the Blood Disp: 60 tablet Rfl: 1 Taking   Probiotic Product (PROBIOTIC OR) Take  by mouth daily. Disp:  Rfl:  Taking   Glucosamine 500 MG Oral Tab Take  by mouth daily.  Dis 6/15/2013   • Hypothyroidism    • IBS (irritable bowel syndrome)    • Internal hemorrhoids with other complication 96/4/6623    prolapsed   • Migraine headache    • Muscle weakness    • Neuralgia, neuritis, and radiculitis, unspecified 3/1/2013   • Obese Danika Vivas MD at [address] MICRODISCECTOMY 3 LEVEL Right 6/14/2013    Performed by Gianna Caba MD at [facility] MAIN OR   • KELLY NEEDLE LOCALIZATION W/ SPECIMEN 1 SITE RIGHT     • ORTHOPEDIC SURG (PBP)      lumbar dis have discussed the risks and benefits and alternatives with the patient/family. They understand and agree to proceed with plan of care. [ x ] I have reviewed the History and Physical done within the last 30 days. Any changes noted above.     Farideh Alvarez

## 2019-03-26 ENCOUNTER — TELEPHONE (OUTPATIENT)
Dept: PAIN CLINIC | Facility: CLINIC | Age: 74
End: 2019-03-26

## 2019-03-26 NOTE — TELEPHONE ENCOUNTER
Spoke with patient regarding OR procedure on right SI joint. Patient states prior to procedure pain was 10/10 pain. Post procedure patient states pain went down to 8/10 for a \"few\" days.  Patient states she has another visit with Dr. Tae Godwin for another proc

## 2019-03-28 ENCOUNTER — TELEPHONE (OUTPATIENT)
Dept: SURGERY | Facility: CLINIC | Age: 74
End: 2019-03-28

## 2019-03-28 NOTE — TELEPHONE ENCOUNTER
Left message for patient to call back to confirm procedure date of 4/2/19 with Dr Denver San and to be checked in at outpatient registration at 7:45 am. Patient to be instructed to call pre-procedure line before procedure at [phone].  Patient to be instructe

## 2019-04-02 ENCOUNTER — HOSPITAL ENCOUNTER (OUTPATIENT)
Facility: HOSPITAL | Age: 74
Setting detail: HOSPITAL OUTPATIENT SURGERY
Discharge: HOME | End: 2019-04-02
Attending: ANESTHESIOLOGY | Admitting: ANESTHESIOLOGY
Payer: COMMERCIAL

## 2019-04-02 ENCOUNTER — APPOINTMENT (OUTPATIENT)
Dept: GENERAL RADIOLOGY | Facility: HOSPITAL | Age: 74
End: 2019-04-02
Attending: ANESTHESIOLOGY
Payer: COMMERCIAL

## 2019-04-02 VITALS
HEART RATE: 70 BPM | OXYGEN SATURATION: 97 % | TEMPERATURE: 99 F | SYSTOLIC BLOOD PRESSURE: 179 MMHG | DIASTOLIC BLOOD PRESSURE: 88 MMHG | RESPIRATION RATE: 16 BRPM

## 2019-04-02 DIAGNOSIS — M54.16 LUMBAR RADICULITIS: ICD-10-CM

## 2019-04-02 PROCEDURE — 3E0R33Z INTRODUCTION OF ANTI-INFLAMMATORY INTO SPINAL CANAL, PERCUTANEOUS APPROACH: ICD-10-PCS | Performed by: ANESTHESIOLOGY

## 2019-04-02 PROCEDURE — 82962 GLUCOSE BLOOD TEST: CPT

## 2019-04-02 PROCEDURE — 3E0R3BZ INTRODUCTION OF ANESTHETIC AGENT INTO SPINAL CANAL, PERCUTANEOUS APPROACH: ICD-10-PCS | Performed by: ANESTHESIOLOGY

## 2019-04-02 RX ORDER — DIPHENHYDRAMINE HYDROCHLORIDE 50 MG/ML
50 INJECTION INTRAMUSCULAR; INTRAVENOUS ONCE AS NEEDED
Status: DISCONTINUED | OUTPATIENT
Start: 2019-04-02 | End: 2019-04-02

## 2019-04-02 RX ORDER — INSULIN ASPART 100 [IU]/ML
3 INJECTION, SOLUTION INTRAVENOUS; SUBCUTANEOUS ONCE
Status: DISCONTINUED | OUTPATIENT
Start: 2019-04-02 | End: 2019-04-02

## 2019-04-02 RX ORDER — ONDANSETRON 2 MG/ML
4 INJECTION INTRAMUSCULAR; INTRAVENOUS ONCE AS NEEDED
Status: DISCONTINUED | OUTPATIENT
Start: 2019-04-02 | End: 2019-04-02

## 2019-04-02 RX ORDER — SODIUM CHLORIDE, SODIUM LACTATE, POTASSIUM CHLORIDE, CALCIUM CHLORIDE 600; 310; 30; 20 MG/100ML; MG/100ML; MG/100ML; MG/100ML
100 INJECTION, SOLUTION INTRAVENOUS CONTINUOUS
Status: DISCONTINUED | OUTPATIENT
Start: 2019-04-02 | End: 2019-04-02

## 2019-04-02 RX ORDER — DEXTROSE MONOHYDRATE 25 G/50ML
50 INJECTION, SOLUTION INTRAVENOUS
Status: DISCONTINUED | OUTPATIENT
Start: 2019-04-02 | End: 2019-04-02

## 2019-04-02 RX ORDER — DEXAMETHASONE SODIUM PHOSPHATE 10 MG/ML
INJECTION, SOLUTION INTRAMUSCULAR; INTRAVENOUS AS NEEDED
Status: DISCONTINUED | OUTPATIENT
Start: 2019-04-02 | End: 2019-04-02

## 2019-04-02 RX ORDER — LIDOCAINE HYDROCHLORIDE 10 MG/ML
INJECTION, SOLUTION EPIDURAL; INFILTRATION; INTRACAUDAL; PERINEURAL AS NEEDED
Status: DISCONTINUED | OUTPATIENT
Start: 2019-04-02 | End: 2019-04-02

## 2019-04-02 NOTE — H&P
History & Physical Examination    Patient Name: Rosemary Mckeon  MRN: [id number]  CSN: [id number]  YOB: 1945    Pre-Operative Diagnosis:  Lumbar radiculitis [M54.16]    Present Illness: A 68year old female with low back pain is here for right aspirin 81 MG Oral Tab Take 81 mg by mouth daily. Disp:  Rfl:  3/17/2019 at Unknown time   Probiotic Product (PROBIOTIC OR) Take  by mouth daily. Disp:  Rfl:  Taking   Glucosamine 500 MG Oral Tab Take  by mouth daily.  Disp:  Rfl:  Taking   SYNTHROID 48 M IBS (irritable bowel syndrome)    • Internal hemorrhoids with other complication 25/8/8546    prolapsed   • Migraine headache    • Muscle weakness    • Neuralgia, neuritis, and radiculitis, unspecified 3/1/2013   • Obese    • Other ill-defined conditions(7 FOR PAIN MANAGEMENT   • LUMBAR MICRODISCECTOMY 3 LEVEL Right 6/14/2013    Performed by Elizabeth Redding MD at [address]   • KELLY NEEDLE LOCALIZATION W/ SPECIMEN 1 SITE RIGHT     • ORTHOPEDIC SURG (PBP)      lumbar disc x2 2010/2012   • OTHER  2011 she normally is not this high and follows up closely with PCP. Denies any CP, SOB or dizziness.     LUNGS [x ] [x ]    ABDOMEN [x ] [x ]    UROGENITAL [x ] [x ]    EXTREMITIES [x ] [x ]    OTHER        [ x ] I have discussed the risks and benefits and alter

## 2019-04-02 NOTE — OPERATIVE REPORT
[facility]  Operative Report  2019     Sandoval Aguiar Patient Status:  Hospital Outpatient Surgery    1945 MRN [id number]   [facility] ENDOSCOPY Attending Fede Childress MD   Hosp Day # 0 PCP Delio Jasso MD     Indication space at this level. The needle position was confirmed under AP and lateral fluoroscopic view. Following negative aspiration for CSF and blood, approximately 1 mL of Omnipaque 240 was injected.   An excellent contrast spread along the epidural space and t

## 2019-05-08 ENCOUNTER — TELEPHONE (OUTPATIENT)
Dept: SURGERY | Facility: CLINIC | Age: 74
End: 2019-05-08

## 2019-05-10 NOTE — TELEPHONE ENCOUNTER
Spoke to pt who states 2nd injection lasted for a brief time but then pain got worse. Pt inquired as to whether or not I was familiar w/ Dr Peg Dueñas.  Searched this doctor through [address] [place], [place] [address]nneka Xiong and found he is an orthopedic surgeon w/ [employer], which I relayed to

## 2019-05-17 ENCOUNTER — OFFICE VISIT (OUTPATIENT)
Dept: INTERNAL MEDICINE CLINIC | Facility: CLINIC | Age: 74
End: 2019-05-17
Payer: COMMERCIAL

## 2019-05-17 ENCOUNTER — MED REC SCAN ONLY (OUTPATIENT)
Dept: INTERNAL MEDICINE CLINIC | Facility: CLINIC | Age: 74
End: 2019-05-17

## 2019-05-17 VITALS
HEART RATE: 78 BPM | WEIGHT: 213 LBS | SYSTOLIC BLOOD PRESSURE: 124 MMHG | DIASTOLIC BLOOD PRESSURE: 72 MMHG | BODY MASS INDEX: 41.27 KG/M2 | TEMPERATURE: 99 F | HEIGHT: 60.24 IN | OXYGEN SATURATION: 98 % | RESPIRATION RATE: 16 BRPM

## 2019-05-17 DIAGNOSIS — M48.061 SPINAL STENOSIS, LUMBAR REGION, WITHOUT NEUROGENIC CLAUDICATION: ICD-10-CM

## 2019-05-17 DIAGNOSIS — M53.3 CHRONIC RIGHT SI JOINT PAIN: ICD-10-CM

## 2019-05-17 DIAGNOSIS — M48.061 DEGENERATIVE LUMBAR SPINAL STENOSIS: ICD-10-CM

## 2019-05-17 DIAGNOSIS — G89.29 CHRONIC RIGHT SI JOINT PAIN: ICD-10-CM

## 2019-05-17 DIAGNOSIS — I10 ESSENTIAL HYPERTENSION: ICD-10-CM

## 2019-05-17 DIAGNOSIS — Z98.890 S/P LUMBAR LAMINECTOMY: ICD-10-CM

## 2019-05-17 DIAGNOSIS — E11.9 TYPE 2 DIABETES MELLITUS WITHOUT COMPLICATION, WITHOUT LONG-TERM CURRENT USE OF INSULIN (HCC): ICD-10-CM

## 2019-05-17 DIAGNOSIS — Z00.00 ANNUAL PHYSICAL EXAM: Primary | ICD-10-CM

## 2019-05-17 DIAGNOSIS — E03.9 HYPOTHYROIDISM, UNSPECIFIED TYPE: ICD-10-CM

## 2019-05-17 PROCEDURE — 99202 OFFICE O/P NEW SF 15 MIN: CPT | Performed by: FAMILY MEDICINE

## 2019-05-17 PROCEDURE — 90670 PCV13 VACCINE IM: CPT | Performed by: FAMILY MEDICINE

## 2019-05-17 PROCEDURE — 90471 IMMUNIZATION ADMIN: CPT | Performed by: FAMILY MEDICINE

## 2019-05-17 PROCEDURE — 99387 INIT PM E/M NEW PAT 65+ YRS: CPT | Performed by: FAMILY MEDICINE

## 2019-05-17 NOTE — PATIENT INSTRUCTIONS
Get lab results from previous doctor. Prevention Guidelines, Women Ages 72 and Older  Screening tests and vaccines are an important part of managing your health.  A screening test is done to find possible disorders or diseases in people who don't ha This screening is advised against for women over 75 or if there is a life expectancy of less than 10 years.  Multiple tests are available and are used at different times.  Possible tests include: flexible sigmoidoscopy, colonoscopy, double-contrast barium e Vision All women in this age group Every 1 to 2 years; if you have a chronic health condition, ask your healthcare provider if you need exams more often   Vaccine Who needs it How often   Chickenpox (varicella) All women in this age group who have no recor Use of daily aspirin Talk to your healthcare provider about whether or not to start taking low-dose aspirin for the prevention of cardiovascular disease (CVD) and colorectal cancer in adults ages 61 to 71 who have at least a 10% risk of getting CVD within

## 2019-05-19 NOTE — PROGRESS NOTES
HPI:    Patient ID: Aisha Loyola is a 68year old female. HPI  Here to establish care. Patient has been seeing Neuro and Pain specialist for chronic low back pain. Has had injections and these have only helped somewhat.  Is frustrated that she is cont complication, not stated as uncontrolled    • Unspecified closed fracture of ankle 6/20/2013    Log Date: 10/10/2011    • Unspecified disorder of thyroid    • Valvular disease     MVP   • Visual impairment     glasses     Past Surgical History:   Procedure RIGHT OR LEFT Right 7/25/2017    Performed by Liudmila Flood MD at [address] [place] [address]   • SACROILIAC JOINT INJECTION RIGHT OR LEFT Right 1/8/2015    Performed by Liudmila Flood MD at [facility] MAIN OR   • SI JOINT INJECTION N/A 6/9/2014    Performed by Princess Mcclendon Negative for dysphoric mood. The patient is not nervous/anxious. Current Outpatient Medications:  Omega-3 1400 MG Oral Cap Take by mouth. Disp:  Rfl:    DilTIAZem HCl  MG Oral Capsule SR 24 Hr Take 240 mg by mouth.  Disp:  Rfl:    pregab NAUSEA AND VOMITING, DIZZINESS,                            HALLUCINATION   PHYSICAL EXAM:   Physical Exam   Constitutional: She appears well-developed and well-nourished. HENT:   Head: Normocephalic and atraumatic.    Right Ear: Tympanic membrane, externa FR#[id number]

## 2019-05-23 ENCOUNTER — OFFICE VISIT (OUTPATIENT)
Dept: PAIN CLINIC | Facility: CLINIC | Age: 74
End: 2019-05-23
Payer: COMMERCIAL

## 2019-05-23 VITALS
WEIGHT: 213 LBS | HEIGHT: 60.24 IN | SYSTOLIC BLOOD PRESSURE: 124 MMHG | BODY MASS INDEX: 41.27 KG/M2 | DIASTOLIC BLOOD PRESSURE: 80 MMHG

## 2019-05-23 DIAGNOSIS — M46.1 SACROILIITIS (HCC): ICD-10-CM

## 2019-05-23 DIAGNOSIS — M54.16 LUMBAR RADICULITIS: ICD-10-CM

## 2019-05-23 DIAGNOSIS — M96.1 FAILED BACK SURGICAL SYNDROME: Primary | ICD-10-CM

## 2019-05-23 PROCEDURE — 99214 OFFICE O/P EST MOD 30 MIN: CPT | Performed by: NURSE PRACTITIONER

## 2019-05-23 NOTE — PROGRESS NOTES
Name: Rosemary Mckeon   :    DOS: 2019     Pain Clinic Follow Up Visit:   Rosemary Mckeon is a 68year old female who presents for recheck of her chronic low back.  Patient with history of 3 failed back surgeries s/p right SIJI on 3/19/19 a tablets by mouth every 6 (six) hours as needed for 10 days. Disp: 100 tablet Rfl: 0   psyllium (METAMUCIL SMOOTH TEXTURE) 28 % Oral Powd Pack Take 1 packet by mouth daily.  Disp: 30 packet Rfl: 0   magnesium oxide (MAG-OX) 400 MG Oral Tab Take 1 tablet by m fail to improve.     Macrina Garcia, APRN, 5/23/2019, 1:45 PM

## 2019-06-07 ENCOUNTER — TELEPHONE (OUTPATIENT)
Dept: SURGERY | Facility: CLINIC | Age: 74
End: 2019-06-07

## 2019-06-07 NOTE — TELEPHONE ENCOUNTER
Spoke to pt to relay info below. Pt verbalized understanding. Encouraged pt to seek care in the ED in order to rule out other causes for change in sx. Pt verbalized understanding. Transferred pt to  to make appt w/ NS for consult.

## 2019-06-07 NOTE — TELEPHONE ENCOUNTER
Patient calling with pain to:  SI joint, both legs are numb to knee, \"hurt like heck,\" and are burning. New sx. Pt states she is able to move her legs in a normal fashion, it is just painful to do so.      Rating pain: 10/10  States pain is: Numbness and

## 2019-06-07 NOTE — TELEPHONE ENCOUNTER
Since injections are not giving her much relief per Dr. Drummond Lob the only other thing we had to over her is the SCS. If the pain is severe, she should go to the UC or ER for evaluation.  Patient is more than welcome to try discectomy or stem cell, we just do no

## 2019-06-10 ENCOUNTER — OFFICE VISIT (OUTPATIENT)
Dept: SURGERY | Facility: CLINIC | Age: 74
End: 2019-06-10
Payer: COMMERCIAL

## 2019-06-10 ENCOUNTER — TELEPHONE (OUTPATIENT)
Dept: SURGERY | Facility: CLINIC | Age: 74
End: 2019-06-10

## 2019-06-10 VITALS
BODY MASS INDEX: 41.27 KG/M2 | DIASTOLIC BLOOD PRESSURE: 80 MMHG | HEART RATE: 80 BPM | HEIGHT: 60.24 IN | OXYGEN SATURATION: 98 % | RESPIRATION RATE: 16 BRPM | WEIGHT: 213 LBS | SYSTOLIC BLOOD PRESSURE: 140 MMHG

## 2019-06-10 DIAGNOSIS — M96.1 FAILED BACK SURGICAL SYNDROME: Primary | ICD-10-CM

## 2019-06-10 PROCEDURE — 99203 OFFICE O/P NEW LOW 30 MIN: CPT | Performed by: NEUROLOGICAL SURGERY

## 2019-06-10 RX ORDER — DILTIAZEM HYDROCHLORIDE 240 MG/1
1 CAPSULE, COATED, EXTENDED RELEASE ORAL DAILY
COMMUNITY
Start: 2019-06-04 | End: 2019-08-16

## 2019-06-10 RX ORDER — METFORMIN HYDROCHLORIDE 500 MG/1
1 TABLET, EXTENDED RELEASE ORAL 2 TIMES DAILY
COMMUNITY
Start: 2019-06-04 | End: 2019-09-09

## 2019-06-10 NOTE — H&P
[facility]  Neurosurgery Consult    REASON FOR CONSULTATION:  New left leg pain    HISTORY OF PRESENT ILLNESS:  Zakia Harley is a(n) 68year old female with an extensive history of surgery with Dr. Washington Siddiqui and Dr. Michelle Cummins,  rece thyroid    • Postoperative seroma 3/1/2013   • Rectocele 11/7/2013   • S/P lumbar laminectomy 3/1/2013   • Spinal stenosis, lumbar 3/18/2009   • Spondylosis of unspecified site without mention of myelopathy 3/1/2013   • Thoracic or lumbosacral neuritis or OTHER  2011    orif left ankle   • OTHER      AP repair   • OTHER SURGICAL HISTORY      bunionectomy   • OTHER SURGICAL HISTORY  1993    breast biopsy   • OTHER SURGICAL HISTORY      back surgery   • OTHER SURGICAL HISTORY  2011    open surgical reduction SIGNS: /80   Pulse 80   Resp 16   Ht 60.24\"   Wt 213 lb   SpO2 98%   BMI 41.27 kg/m²   GENERAL:  Patient is a 68year old female in no acute distress.   NEUROLOGICAL:     Cognition: alert and oriented x 3, speech fluent    Cranial nerves: Pupils Venezuela MD  Neurological Surgeon  [facility]  [address], [address], [address]

## 2019-06-10 NOTE — TELEPHONE ENCOUNTER
Left message to call back. Unsure which procedure patient is referring to.     Per ALDAIR 6/7/19:  TRISTA Collier           6/7/19 11:30 AM   Note     Since injections are not giving her much relief per Dr. Teodoro Ogden the only other thing we had to over her is t

## 2019-06-10 NOTE — PROGRESS NOTES
Location of Pain:  Back pain Left and right leg    Date Pain Began:  Nov 2008        Work Related:   No        Receiving Work Comp/Disability:   No    Numeric Rating Scale:  Pain at Present:  10

## 2019-06-14 NOTE — TELEPHONE ENCOUNTER
Patient called back. She states she was seen by neurosurgery and was recommended for a procedure.      Per OV:  PLAN:  -follow-up with Dr. Denney Primrose, discuss L3 nerve root injection on the left    Additionally, discussed prior auth process if injection is recomm

## 2019-06-17 NOTE — TELEPHONE ENCOUNTER
We can schedule the procedure if this is a diagnostic for surgical purpose. If she is not thinking surgery then I would have her make an appointment with Dr. Kathrine Negro since he has said that SCS trial is the only thing we have left to offer her.

## 2019-06-18 NOTE — TELEPHONE ENCOUNTER
Attempted to reach patient again and LMTCB. LMTCB. When returns call please relay below and help patient schedule an office visit with Dr Katlyn Ellison to discuss SCS trial and recommendations per Dr Shane Akers.

## 2019-07-02 NOTE — TELEPHONE ENCOUNTER
Called patient. She states that she is not interested in either surgery or SCS trial at the moment. She is looking for other options such as stem cell therapy.

## 2019-07-02 NOTE — TELEPHONE ENCOUNTER
Patient only has enough medication till the end of the week and this needs to go to the mail order pharmacy  [phone].

## 2019-07-03 ENCOUNTER — MED REC SCAN ONLY (OUTPATIENT)
Dept: INTERNAL MEDICINE CLINIC | Facility: CLINIC | Age: 74
End: 2019-07-03

## 2019-07-05 RX ORDER — LIDOCAINE 50 MG/G
2 PATCH TOPICAL EVERY 24 HOURS
Qty: 6 PATCH | Refills: 0 | Status: SHIPPED | OUTPATIENT
Start: 2019-07-05 | End: 2019-07-10

## 2019-07-10 ENCOUNTER — TELEPHONE (OUTPATIENT)
Dept: INTERNAL MEDICINE CLINIC | Facility: CLINIC | Age: 74
End: 2019-07-10

## 2019-07-10 RX ORDER — LIDOCAINE 50 MG/G
2 PATCH TOPICAL EVERY 24 HOURS
Qty: 60 PATCH | Refills: 0 | Status: SHIPPED
Start: 2019-07-10 | End: 2019-07-19

## 2019-07-10 NOTE — TELEPHONE ENCOUNTER
Received fax from [employer] requesting clarification on QUANTITY for Lidocaine Rx. Each box contains 30 patches. Please advise, thank you.    lidocaine 5 % External Patch 6 patch 0 7/5/2019    Sig:   Place 2 patches onto the skin daily.      [facility]

## 2019-07-10 NOTE — TELEPHONE ENCOUNTER
Obtained AMS signature on fax and faxed back to [facility] at [phone]; fax confirmed.     Updated Rx on med list.

## 2019-07-19 RX ORDER — LIDOCAINE 50 MG/G
3 PATCH TOPICAL EVERY 24 HOURS
Qty: 270 PATCH | Refills: 1 | Status: SHIPPED | OUTPATIENT
Start: 2019-07-19 | End: 2019-09-23

## 2019-07-19 NOTE — TELEPHONE ENCOUNTER
Pt came in stated she needs 3 patches daily times 90.  She always gets a total of 9 boxes for $25. Please advise     (pt came in and dropped off a form explaining the refill-I placed it in AMS file folder at the )

## 2019-07-29 ENCOUNTER — TELEPHONE (OUTPATIENT)
Dept: PAIN CLINIC | Facility: CLINIC | Age: 74
End: 2019-07-29

## 2019-07-29 DIAGNOSIS — M96.1 FAILED BACK SURGICAL SYNDROME: Primary | ICD-10-CM

## 2019-07-29 NOTE — TELEPHONE ENCOUNTER
Received psychological evaluation from Dr. Юлия Mendez and okay to proceed with SCS trial. Please call patient to schedule.

## 2019-07-30 NOTE — TELEPHONE ENCOUNTER
Please start prior auth for:    SCS [place] with Dr. Tae Godwin    (copy of psych eval provided to PA team)

## 2019-07-30 NOTE — TELEPHONE ENCOUNTER
Spoke with Jayde Borja at [facility] [phone]  Pending Case #:[id number]    ZJ:90:58    Per [address] [place] [address] is Required for SCS [place] ([id number]).      Prior Authorization Form needs to be filled out as well as clinical information needs to b

## 2019-08-02 ENCOUNTER — MED REC SCAN ONLY (OUTPATIENT)
Dept: INTERNAL MEDICINE CLINIC | Facility: CLINIC | Age: 74
End: 2019-08-02

## 2019-08-02 NOTE — PROGRESS NOTES
Received DM eye exam results from [facility]. Abstracted. No DM retinopathy. Placed in AMS bin for review. Then will send to scanning.

## 2019-08-06 NOTE — TELEPHONE ENCOUNTER
Called patient to schedule.  Patient states that she would like to try the stem cell therapy first. She will call back if deciding to proceed with SCS trial.

## 2019-08-06 NOTE — TELEPHONE ENCOUNTER
Prior authorization request completed for: [facility]   Authorization #[id number]  Spoke with Kadie Alba (Nurse Reviewer) at [facility]   Per Kadie Alba confirmed [place] [address] that Approval letter has been mailed out to our office,and patient.     Authorization da

## 2019-08-07 ENCOUNTER — TELEPHONE (OUTPATIENT)
Dept: INTERNAL MEDICINE CLINIC | Facility: CLINIC | Age: 74
End: 2019-08-07

## 2019-08-07 NOTE — TELEPHONE ENCOUNTER
Pt received a call from Dr Андрей Almendarez office and was told she is approved for the spinal cord implant. Pt would like AMS to be aware she doesn't want to do that yet.  She wants to try the stem cell therapy first. She doesn't need orders or referrals she stated he

## 2019-08-16 ENCOUNTER — OFFICE VISIT (OUTPATIENT)
Dept: INTERNAL MEDICINE CLINIC | Facility: CLINIC | Age: 74
End: 2019-08-16
Payer: COMMERCIAL

## 2019-08-16 VITALS
HEART RATE: 100 BPM | RESPIRATION RATE: 16 BRPM | HEIGHT: 60.24 IN | BODY MASS INDEX: 41.78 KG/M2 | SYSTOLIC BLOOD PRESSURE: 148 MMHG | WEIGHT: 215.63 LBS | DIASTOLIC BLOOD PRESSURE: 70 MMHG

## 2019-08-16 DIAGNOSIS — Z12.31 VISIT FOR SCREENING MAMMOGRAM: ICD-10-CM

## 2019-08-16 DIAGNOSIS — E11.9 TYPE 2 DIABETES MELLITUS WITHOUT COMPLICATION, WITHOUT LONG-TERM CURRENT USE OF INSULIN (HCC): ICD-10-CM

## 2019-08-16 DIAGNOSIS — E03.9 HYPOTHYROIDISM, UNSPECIFIED TYPE: ICD-10-CM

## 2019-08-16 DIAGNOSIS — M48.061 SPINAL STENOSIS, LUMBAR REGION, WITHOUT NEUROGENIC CLAUDICATION: Primary | ICD-10-CM

## 2019-08-16 PROCEDURE — 99214 OFFICE O/P EST MOD 30 MIN: CPT | Performed by: FAMILY MEDICINE

## 2019-08-16 RX ORDER — METHOCARBAMOL 750 MG/1
750 TABLET, FILM COATED ORAL 3 TIMES DAILY
COMMUNITY
End: 2019-08-16

## 2019-08-16 RX ORDER — METHOCARBAMOL 750 MG/1
750 TABLET, FILM COATED ORAL 3 TIMES DAILY
Qty: 270 TABLET | Refills: 0 | Status: SHIPPED | OUTPATIENT
Start: 2019-08-16 | End: 2020-09-08

## 2019-08-16 NOTE — PROGRESS NOTES
HPI:    Patient ID: Boston To is a 68year old female. HPI Here with request for mammogram order. Also requests refill of methocarbamol as this helps for her back.    Aware she still needs to bring in any recent lab results for thyroid and diabet Systems   Constitutional: Negative for chills and fatigue. Musculoskeletal: Positive for arthralgias and back pain. Neurological: Negative for dizziness and light-headedness.             Current Outpatient Medications:  methocarbamol 750 MG Oral Tab Vincent Abdullahi psyllium (METAMUCIL SMOOTH TEXTURE) 28 % Oral Powd Pack Take 1 packet by mouth daily.  Disp: 30 packet Rfl: 0     Allergies:  Tramadol                NAUSEA AND VOMITING  Vicodin [Hydrocodon*    NAUSEA AND VOMITING, DIZZINESS,                            H

## 2019-09-04 NOTE — TELEPHONE ENCOUNTER
Last Ov: 8/16/19, AMS, F/U  Upcoming appt: no upcoming appt  Last labs:  BMP 1/2/17 by Dr. Leanne Mccormick  Last Rx: lyrica 100mg, #180, 0R 7/5/19

## 2019-09-06 ENCOUNTER — TELEPHONE (OUTPATIENT)
Dept: INTERNAL MEDICINE CLINIC | Facility: CLINIC | Age: 74
End: 2019-09-06

## 2019-09-06 DIAGNOSIS — E11.9 TYPE 2 DIABETES MELLITUS WITHOUT COMPLICATION, WITHOUT LONG-TERM CURRENT USE OF INSULIN (HCC): Primary | ICD-10-CM

## 2019-09-06 DIAGNOSIS — E03.9 HYPOTHYROIDISM, UNSPECIFIED TYPE: ICD-10-CM

## 2019-09-06 NOTE — TELEPHONE ENCOUNTER
LVTCB     received fax from [address]    Requesting  1. Triamt/HCTZ last given in 2/23/17  2. Losartan last given in 2/9/17  3. Rocio Glass / diltaizem (did not fill at this office)    Addition fax received from [address]  1. Metformin  2.synthroid     Please ask nadeem

## 2019-09-09 RX ORDER — LEVOTHYROXINE SODIUM 0.05 MG/1
50 TABLET ORAL
Qty: 90 TABLET | Refills: 0 | Status: ON HOLD | OUTPATIENT
Start: 2019-09-09 | End: 2019-11-12

## 2019-09-09 RX ORDER — DILTIAZEM HYDROCHLORIDE 240 MG/1
240 CAPSULE, EXTENDED RELEASE ORAL DAILY
Qty: 90 CAPSULE | Refills: 0 | Status: ON HOLD | OUTPATIENT
Start: 2019-09-09 | End: 2019-11-12

## 2019-09-09 RX ORDER — TRIAMTERENE AND HYDROCHLOROTHIAZIDE 37.5; 25 MG/1; MG/1
1 TABLET ORAL DAILY
Qty: 90 TABLET | Refills: 0 | Status: SHIPPED | OUTPATIENT
Start: 2019-09-09 | End: 2020-01-17

## 2019-09-09 RX ORDER — LOSARTAN POTASSIUM 50 MG/1
50 TABLET ORAL DAILY
Qty: 90 TABLET | Refills: 0 | Status: SHIPPED | OUTPATIENT
Start: 2019-09-09 | End: 2020-01-17

## 2019-09-09 RX ORDER — METFORMIN HYDROCHLORIDE 500 MG/1
500 TABLET, EXTENDED RELEASE ORAL 2 TIMES DAILY
Qty: 180 TABLET | Refills: 0 | Status: SHIPPED | OUTPATIENT
Start: 2019-09-09 | End: 2020-01-17

## 2019-09-09 NOTE — TELEPHONE ENCOUNTER
Please call patient. She is due for fasting labs and urine test. Orders are in at [facility].

## 2019-09-23 RX ORDER — LIDOCAINE 50 MG/G
3 PATCH TOPICAL EVERY 24 HOURS
Qty: 270 PATCH | Refills: 1 | Status: SHIPPED | OUTPATIENT
Start: 2019-09-23 | End: 2020-06-11

## 2019-09-23 NOTE — TELEPHONE ENCOUNTER
[facility] request for Lidocaine Patch and Lyrica    Last Office Visit 8/16/19    Last CPE 5/17/19    Last refill   lyrica 100mg 9/4/19 (180 capsule, 1 refills)    Lidocaine patch 7/19/19 ( 270 patch, 1 refills)     Last labs that are related n/a    Future ap

## 2019-10-04 ENCOUNTER — LAB ENCOUNTER (OUTPATIENT)
Dept: LAB | Age: 74
End: 2019-10-04
Attending: FAMILY MEDICINE
Payer: COMMERCIAL

## 2019-10-04 ENCOUNTER — HOSPITAL ENCOUNTER (OUTPATIENT)
Dept: MAMMOGRAPHY | Age: 74
Discharge: HOME | End: 2019-10-04
Attending: FAMILY MEDICINE
Payer: COMMERCIAL

## 2019-10-04 DIAGNOSIS — E11.9 TYPE 2 DIABETES MELLITUS WITHOUT COMPLICATION, WITHOUT LONG-TERM CURRENT USE OF INSULIN (HCC): ICD-10-CM

## 2019-10-04 DIAGNOSIS — E03.9 HYPOTHYROIDISM, UNSPECIFIED TYPE: ICD-10-CM

## 2019-10-04 DIAGNOSIS — Z12.31 VISIT FOR SCREENING MAMMOGRAM: ICD-10-CM

## 2019-10-04 PROCEDURE — 84439 ASSAY OF FREE THYROXINE: CPT

## 2019-10-04 PROCEDURE — 83036 HEMOGLOBIN GLYCOSYLATED A1C: CPT

## 2019-10-04 PROCEDURE — 84481 FREE ASSAY (FT-3): CPT

## 2019-10-04 PROCEDURE — 84443 ASSAY THYROID STIM HORMONE: CPT

## 2019-10-04 PROCEDURE — 77063 BREAST TOMOSYNTHESIS BI: CPT | Performed by: FAMILY MEDICINE

## 2019-10-04 PROCEDURE — 77067 SCR MAMMO BI INCL CAD: CPT | Performed by: FAMILY MEDICINE

## 2019-10-04 PROCEDURE — 82043 UR ALBUMIN QUANTITATIVE: CPT

## 2019-10-04 PROCEDURE — 80061 LIPID PANEL: CPT

## 2019-10-04 PROCEDURE — 80053 COMPREHEN METABOLIC PANEL: CPT

## 2019-10-04 PROCEDURE — 82570 ASSAY OF URINE CREATININE: CPT

## 2019-10-07 DIAGNOSIS — E03.9 HYPOTHYROIDISM, UNSPECIFIED TYPE: Primary | ICD-10-CM

## 2019-10-09 ENCOUNTER — HOSPITAL ENCOUNTER (OUTPATIENT)
Age: 74
Discharge: HOME | End: 2019-10-09
Attending: FAMILY MEDICINE
Payer: COMMERCIAL

## 2019-10-09 VITALS
HEART RATE: 86 BPM | OXYGEN SATURATION: 96 % | SYSTOLIC BLOOD PRESSURE: 160 MMHG | RESPIRATION RATE: 18 BRPM | TEMPERATURE: 98 F | DIASTOLIC BLOOD PRESSURE: 74 MMHG

## 2019-10-09 DIAGNOSIS — E03.9 HYPOTHYROIDISM, UNSPECIFIED TYPE: Primary | ICD-10-CM

## 2019-10-09 DIAGNOSIS — L30.9 DERMATITIS: Primary | ICD-10-CM

## 2019-10-09 PROCEDURE — 99213 OFFICE O/P EST LOW 20 MIN: CPT

## 2019-10-09 PROCEDURE — 99203 OFFICE O/P NEW LOW 30 MIN: CPT

## 2019-10-10 DIAGNOSIS — E03.9 HYPOTHYROIDISM, UNSPECIFIED TYPE: Primary | ICD-10-CM

## 2019-10-10 NOTE — ED PROVIDER NOTES
Patient Seen in: [facility]      History   Patient presents with:  Rash Skin Problem (integumentary)    Stated Complaint: Left thigh Rash x 6 weeks     HPI    66-year-old female presents with complaints of rash to the left or lumbosacral neuritis or radiculitis, unspecified 6/20/2013    Log Date: 06/26/2012    • Type II or unspecified type diabetes mellitus without mention of complication, not stated as uncontrolled    • Unspecified closed fracture of ankle 6/20/2013    Log reduction fracture   • SACROILIAC JOINT INJECTION RIGHT OR LEFT Right 3/19/2019    Performed by Heydi Santizo MD at [facility] LEFT Right 7/25/2017    Performed by Heydi Santizo MD at [place] [address] no acute distress   conjunctiva clear no icterus  Lungs clear to auscultation bilaterally  Heart S1-S2 heard no murmurs no gallops  Patient has 4-5 pink flat to papular lesions without any vesicles localized to the left lateral mid thigh without any signs

## 2019-10-21 ENCOUNTER — TELEPHONE (OUTPATIENT)
Dept: SURGERY | Facility: CLINIC | Age: 74
End: 2019-10-21

## 2019-10-21 DIAGNOSIS — M96.1 FAILED BACK SURGICAL SYNDROME: Primary | ICD-10-CM

## 2019-10-21 NOTE — TELEPHONE ENCOUNTER
Spoke to pt who states she was approved for SCS trial and would like to proceed. Pt state she has not had luck finding a doctor to do stem cell therapy. Noted in chart that PA is good through 2/2020.  Pt was previously offered scheduling, but did not want t

## 2019-10-21 NOTE — TELEPHONE ENCOUNTER
Look good to continue with scheduling SCS trial. Okay to do SCS trial on Thursday if patient prefers.

## 2019-10-23 NOTE — TELEPHONE ENCOUNTER
Per TE 7/29/19- SCS trial.   Authorization dates: 08/05/19 thru 02/01/20    LMTCB     When returns call please help patient schedule SCS trial with Dr Kathrine Robbins

## 2019-10-23 NOTE — TELEPHONE ENCOUNTER
SCS trial scheduled with Dr Jerris Cranker 11/12/19 , check in 1:30 PM     Lead pull scheduled with Dr Jerris Cranker - 11/18/19 at 8:15 AM     Neurosurgery consult with Dr Anay Strickland - 11/20/19 at 9:30 AM     Jonas Stahl notified of above dates and times.

## 2019-10-23 NOTE — TELEPHONE ENCOUNTER
[address]  PRE-PROCEDURE INSTRUCTIONS WITH IV SEDATION           SCS trial with Dr Preeti Frazier     Appointment Date: 11/12/19  Check-In Time: 1:30 PM     ** TO AVOID CANCELLATION AND/OR RESCHEDULING: PLEASE CALL [facility] PRE-PROCEDURE LINE -[phone] ? If you have an implanted Spinal Cord or Peripheral Nerve Stimulator: Please remember to turn device off for procedure      Medication:   Number of days you need to be off for the following medications:  ? Aggrenox 10 days   ?  Agrylin (Anagrelide) 10 days Please call our office with any questions or concerns before or after your procedure at [phone].   If you are a diabetic, please increase the frequency of your glucose monitoring after the procedure as this may cause a temporary increase in your blood

## 2019-11-06 ENCOUNTER — TELEPHONE (OUTPATIENT)
Dept: PAIN CLINIC | Facility: CLINIC | Age: 74
End: 2019-11-06

## 2019-11-06 NOTE — TELEPHONE ENCOUNTER
Left message for patient, confirmed procedure date of 11/12/2019 and to be checked in at outpatient registration at 1:30 PM with patient's  and to hold food and drink 8 hours prior to procedure.  Encouraged patient to listen to the pre-procedure line

## 2019-11-12 ENCOUNTER — HOSPITAL ENCOUNTER (OUTPATIENT)
Facility: HOSPITAL | Age: 74
Setting detail: HOSPITAL OUTPATIENT SURGERY
Discharge: HOME | End: 2019-11-12
Attending: ANESTHESIOLOGY | Admitting: ANESTHESIOLOGY
Payer: COMMERCIAL

## 2019-11-12 ENCOUNTER — APPOINTMENT (OUTPATIENT)
Dept: GENERAL RADIOLOGY | Facility: HOSPITAL | Age: 74
End: 2019-11-12
Attending: ANESTHESIOLOGY
Payer: COMMERCIAL

## 2019-11-12 ENCOUNTER — TELEPHONE (OUTPATIENT)
Dept: PAIN CLINIC | Facility: CLINIC | Age: 74
End: 2019-11-12

## 2019-11-12 VITALS
SYSTOLIC BLOOD PRESSURE: 140 MMHG | HEART RATE: 70 BPM | TEMPERATURE: 98 F | DIASTOLIC BLOOD PRESSURE: 76 MMHG | OXYGEN SATURATION: 95 % | RESPIRATION RATE: 19 BRPM

## 2019-11-12 DIAGNOSIS — M96.1 FAILED BACK SURGICAL SYNDROME: ICD-10-CM

## 2019-11-12 PROCEDURE — 00HU3MZ INSERTION OF NEUROSTIMULATOR LEAD INTO SPINAL CANAL, PERCUTANEOUS APPROACH: ICD-10-PCS | Performed by: ANESTHESIOLOGY

## 2019-11-12 PROCEDURE — 99152 MOD SED SAME PHYS/QHP 5/>YRS: CPT | Performed by: ANESTHESIOLOGY

## 2019-11-12 PROCEDURE — 82962 GLUCOSE BLOOD TEST: CPT

## 2019-11-12 RX ORDER — DILTIAZEM HYDROCHLORIDE 240 MG/1
240 CAPSULE, EXTENDED RELEASE ORAL DAILY
Qty: 90 CAPSULE | Refills: 0 | Status: SHIPPED | OUTPATIENT
Start: 2019-11-12 | End: 2021-03-19

## 2019-11-12 RX ORDER — MIDAZOLAM HYDROCHLORIDE 1 MG/ML
INJECTION INTRAMUSCULAR; INTRAVENOUS AS NEEDED
Status: DISCONTINUED | OUTPATIENT
Start: 2019-11-12 | End: 2019-11-12

## 2019-11-12 RX ORDER — DIPHENHYDRAMINE HYDROCHLORIDE 50 MG/ML
50 INJECTION INTRAMUSCULAR; INTRAVENOUS ONCE AS NEEDED
Status: DISCONTINUED | OUTPATIENT
Start: 2019-11-12 | End: 2019-11-12

## 2019-11-12 RX ORDER — ONDANSETRON 2 MG/ML
4 INJECTION INTRAMUSCULAR; INTRAVENOUS ONCE AS NEEDED
Status: DISCONTINUED | OUTPATIENT
Start: 2019-11-12 | End: 2019-11-12

## 2019-11-12 RX ORDER — LEVOTHYROXINE SODIUM 0.05 MG/1
50 TABLET ORAL
Qty: 90 TABLET | Refills: 0 | Status: SHIPPED | OUTPATIENT
Start: 2019-11-12 | End: 2020-01-17

## 2019-11-12 RX ORDER — CEPHALEXIN 500 MG/1
500 CAPSULE ORAL 4 TIMES DAILY
Qty: 28 CAPSULE | Refills: 0 | Status: SHIPPED | OUTPATIENT
Start: 2019-11-12 | End: 2019-11-19

## 2019-11-12 RX ORDER — IBUPROFEN 200 MG
CAPSULE ORAL AS NEEDED
Status: DISCONTINUED | OUTPATIENT
Start: 2019-11-12 | End: 2019-11-12

## 2019-11-12 RX ORDER — INSULIN ASPART 100 [IU]/ML
3 INJECTION, SOLUTION INTRAVENOUS; SUBCUTANEOUS ONCE
Status: DISCONTINUED | OUTPATIENT
Start: 2019-11-12 | End: 2019-11-12

## 2019-11-12 RX ORDER — SODIUM CHLORIDE, SODIUM LACTATE, POTASSIUM CHLORIDE, CALCIUM CHLORIDE 600; 310; 30; 20 MG/100ML; MG/100ML; MG/100ML; MG/100ML
100 INJECTION, SOLUTION INTRAVENOUS CONTINUOUS
Status: DISCONTINUED | OUTPATIENT
Start: 2019-11-12 | End: 2019-11-12

## 2019-11-12 RX ORDER — DEXTROSE MONOHYDRATE 25 G/50ML
50 INJECTION, SOLUTION INTRAVENOUS
Status: DISCONTINUED | OUTPATIENT
Start: 2019-11-12 | End: 2019-11-12

## 2019-11-12 RX ORDER — LIDOCAINE HYDROCHLORIDE 10 MG/ML
INJECTION, SOLUTION EPIDURAL; INFILTRATION; INTRACAUDAL; PERINEURAL AS NEEDED
Status: DISCONTINUED | OUTPATIENT
Start: 2019-11-12 | End: 2019-11-12

## 2019-11-12 NOTE — TELEPHONE ENCOUNTER
Refill request from [facility]  Diltiazem and Levothyroxine  Would like medical clarifications-pending medications    Last OV 8/16/19  Last CPE 5/17/19     Levothyroxine Sodium (SYNTHROID) 50 MCG Oral Tab 90 tablet 0 9/9/2019     dilTIAZem HCl  MG Oral

## 2019-11-12 NOTE — H&P
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 , Disp: , Rfl: , Taking  acetaminophen (TYLENOL EXTRA STRENGTH) 500 MG Oral Tab, Take 2 tablets by mouth every 6 (six) hours as needed for 10 days. , Disp: 100 tablet, Rfl: 0, Taking  psyllium (METAMUCIL SMOOTH TEXTURE) 28 % [facility])    • Disturbance of skin sensation 6/20/2013    Log Date: 06/26/2012    • Diverticulosis    • DM type 2 (diabetes mellitus, type 2) ([facility] 6/15/2013   • Esophageal reflux    • External hemorrhoids with other complication 39/7/6899    with ble INTRAOPERATIVE NEURO MONITORING N/A 6/14/2013    Performed by Merry Forrest MD at [facility] MAIN OR   • LIGATION OF HEMORRHOID(S)      x4   • LUMBAR FACET INJECTION OR MEDIAL BRANCH NERVE BLOCK Right 7/14/2014    Performed by Lyndsey Hendrickson MD at [facility] CE file.  Social History    Tobacco Use      Smoking status: Former Smoker        Years: 10.00        Quit date: 1974        Years since quittin.8      Smokeless tobacco: Never Used    Alcohol use: No      Comment: beer rarely/ once a year      SYSTE

## 2019-11-15 ENCOUNTER — TELEPHONE (OUTPATIENT)
Dept: INTERNAL MEDICINE CLINIC | Facility: CLINIC | Age: 74
End: 2019-11-15

## 2019-11-15 NOTE — TELEPHONE ENCOUNTER
Form completed for [facility]  Pt would like to stay on Synthroid and does not want levothyroxine  Pt needs Cardizem    Pt also needs a refills on other medications  Losartan and Metformin- will need them around next week

## 2019-11-17 NOTE — OPERATIVE REPORT
[facility]  Operative Report  2019     Rosemary Mike Patient Status:  Hospital Outpatient Surgery    1945 MRN [id number]   [facility] ENDOSCOPY Attending No att. providers found   Hosp Day # 0 PCP Bibi Rico DO     In intermittent IV propofol, midazolam, and Fentanyl. The skin and subcutaneous tissue was anesthetized with 1% lidocaine at the L3 level around the midline. A #11 blade was used to make a small skin incision.   A 14-gauge Tuohy needle was inserted and direc removed and the future decision for permanent trial or abandoning this option will be determined according to the patient's response during this week of trial.  The patient understood fully and was discharged in good condition. Complications: None.

## 2019-11-18 ENCOUNTER — OFFICE VISIT (OUTPATIENT)
Dept: PAIN CLINIC | Facility: CLINIC | Age: 74
End: 2019-11-18
Payer: COMMERCIAL

## 2019-11-18 VITALS — HEART RATE: 81 BPM | BODY MASS INDEX: 41 KG/M2 | OXYGEN SATURATION: 96 % | WEIGHT: 210 LBS

## 2019-11-18 DIAGNOSIS — M96.1 FAILED BACK SURGICAL SYNDROME: Primary | ICD-10-CM

## 2019-11-18 PROCEDURE — 99214 OFFICE O/P EST MOD 30 MIN: CPT | Performed by: ANESTHESIOLOGY

## 2019-11-18 NOTE — PROGRESS NOTES
Patient here for SCS trial (11/12/19) lead pull. Patient reports that for the first day following the procedure she \"felt great\" and \"still felt good\" the next day.  Patient states that she needs to move [facility] [place] the vibrations\" and if she turn

## 2019-11-20 ENCOUNTER — TELEPHONE (OUTPATIENT)
Dept: INTERNAL MEDICINE CLINIC | Facility: CLINIC | Age: 74
End: 2019-11-20

## 2019-11-20 NOTE — TELEPHONE ENCOUNTER
LVMTCB     Spoke to patient on 11/15/19-informed me she will need a refill on Losartan and Metformin- wanted to verify pharmacy and medication again before sending

## 2019-11-26 NOTE — PROGRESS NOTES
Name: Susie Mckenzie   : [birth year]   DOS: 2019     Pain Clinic Follow Up Visit:   Susie Mckenzie is a 76year old female who presents for recheck of her chronic low back pain.   She is status post spinal cord stimulator trial.  The patient had 80 TEXTURE) 28 % Oral Powd Pack Take 1 packet by mouth daily. 30 packet 0   • magnesium oxide (MAG-OX) 400 MG Oral Tab Take 1 tablet by mouth 2 (two) times daily.  Indications: Low Amount of Magnesium in the Blood 60 tablet 1   • aspirin 81 MG Oral Tab Take 81

## 2019-12-03 ENCOUNTER — APPOINTMENT (OUTPATIENT)
Dept: GENERAL RADIOLOGY | Facility: HOSPITAL | Age: 74
End: 2019-12-03
Attending: EMERGENCY MEDICINE
Payer: COMMERCIAL

## 2019-12-03 ENCOUNTER — TELEPHONE (OUTPATIENT)
Dept: INTERNAL MEDICINE CLINIC | Facility: CLINIC | Age: 74
End: 2019-12-03

## 2019-12-03 ENCOUNTER — HOSPITAL ENCOUNTER (EMERGENCY)
Facility: HOSPITAL | Age: 74
Discharge: HOME | End: 2019-12-03
Attending: EMERGENCY MEDICINE
Payer: COMMERCIAL

## 2019-12-03 VITALS
RESPIRATION RATE: 18 BRPM | SYSTOLIC BLOOD PRESSURE: 156 MMHG | HEART RATE: 68 BPM | DIASTOLIC BLOOD PRESSURE: 70 MMHG | WEIGHT: 210 LBS | TEMPERATURE: 98 F | OXYGEN SATURATION: 95 % | HEIGHT: 60 IN | BODY MASS INDEX: 41.23 KG/M2

## 2019-12-03 DIAGNOSIS — I10 HYPERTENSION, UNSPECIFIED TYPE: Primary | ICD-10-CM

## 2019-12-03 PROCEDURE — 93010 ELECTROCARDIOGRAM REPORT: CPT

## 2019-12-03 PROCEDURE — 99285 EMERGENCY DEPT VISIT HI MDM: CPT

## 2019-12-03 PROCEDURE — 85025 COMPLETE CBC W/AUTO DIFF WBC: CPT | Performed by: EMERGENCY MEDICINE

## 2019-12-03 PROCEDURE — 87086 URINE CULTURE/COLONY COUNT: CPT | Performed by: EMERGENCY MEDICINE

## 2019-12-03 PROCEDURE — 99284 EMERGENCY DEPT VISIT MOD MDM: CPT

## 2019-12-03 PROCEDURE — 80048 BASIC METABOLIC PNL TOTAL CA: CPT | Performed by: EMERGENCY MEDICINE

## 2019-12-03 PROCEDURE — 71046 X-RAY EXAM CHEST 2 VIEWS: CPT | Performed by: EMERGENCY MEDICINE

## 2019-12-03 PROCEDURE — 81001 URINALYSIS AUTO W/SCOPE: CPT | Performed by: EMERGENCY MEDICINE

## 2019-12-03 PROCEDURE — 93005 ELECTROCARDIOGRAM TRACING: CPT

## 2019-12-03 PROCEDURE — 36415 COLL VENOUS BLD VENIPUNCTURE: CPT

## 2019-12-03 NOTE — TELEPHONE ENCOUNTER
Spoke with patient discussed with her the need to go to ER. Explained to patient BP is high and needs to be addressed even @ 170. Patient verbalized understanding and agreeable to POC.

## 2019-12-03 NOTE — TELEPHONE ENCOUNTER
Dr. Shoaib Zamudio office called stating they have this patient in their office now for a consult and her BP is 192/92 and want to know if they should send her here to see AMS or to the ER-I asked AMS and she said to have them send her to the ER so I told them that

## 2019-12-03 NOTE — ED PROVIDER NOTES
Patient Seen in: [facility] Emergency Department      History   Patient presents with:  Hypertension    Stated Complaint: hypertension    HPI    80-year-old female presents emergency room for evaluation of elevated blood pressure.   Patient states sh and radiculitis, unspecified 3/1/2013   • Obese    • Other ill-defined conditions(799.89)     thyroid    • Postoperative seroma 3/1/2013   • Rectocele 11/7/2013   • S/P lumbar laminectomy 3/1/2013   • Spinal stenosis, lumbar 3/18/2009   • Spondylosis of un RIGHT (CPT=19281)     • ORTHOPEDIC SURG (PBP)      lumbar disc x2 2010/2012   • OTHER  2011    orif left ankle   • OTHER      AP repair   • OTHER SURGICAL HISTORY      bunionectomy   • OTHER SURGICAL HISTORY  1993    breast biopsy   • OTHER SURGICAL HISTOR negative except as noted above.     Physical Exam     ED Triage Vitals [12/03/19 1143]   BP (!) 177/76   Pulse 75   Resp 18   Temp 98.2 °F (36.8 °C)   Temp src Temporal   SpO2 96 %   O2 Device None (Room air)       Current:BP (!) 180/69   Pulse 68   Temp 98 LAVENDER   RAINBOW DRAW LIGHT GREEN   RAINBOW DRAW GOLD   URINE CULTURE, ROUTINE   CBC W/ DIFFERENTIAL     EKG    Rate, intervals and axes as noted on EKG Report.   Rate: 73  Rhythm: Sinus Rhythm  Reading: Normal sinus rhythm, no acute ST or T wave abnormal

## 2019-12-03 NOTE — TELEPHONE ENCOUNTER
Pt called stating she left Dr. Meliton Jensen office and wants to speak to AMS about why she needs to go to the ER due to her BP being up-she states her BP always goes up when she goes to see the doctor-it is now down to 170 over something and wants to speak to AMS

## 2019-12-03 NOTE — ED INITIAL ASSESSMENT (HPI)
Patient arrives for elevated BP. Patient was at physician office today for preop check up and was 413 systolic. Patient denies headache or visual changes. States she had not taken her BP medications prior to MD appointment.

## 2019-12-04 ENCOUNTER — TELEPHONE (OUTPATIENT)
Dept: SURGERY | Facility: CLINIC | Age: 74
End: 2019-12-04

## 2019-12-06 ENCOUNTER — TELEPHONE (OUTPATIENT)
Dept: CARDIOLOGY | Age: 74
End: 2019-12-06

## 2019-12-06 ENCOUNTER — TELEPHONE (OUTPATIENT)
Dept: INTERNAL MEDICINE CLINIC | Facility: CLINIC | Age: 74
End: 2019-12-06

## 2019-12-06 DIAGNOSIS — M96.1 FAILED BACK SYNDROME: ICD-10-CM

## 2019-12-06 DIAGNOSIS — E11.9 TYPE 2 DIABETES MELLITUS WITHOUT COMPLICATION, WITHOUT LONG-TERM CURRENT USE OF INSULIN (HCC): ICD-10-CM

## 2019-12-06 DIAGNOSIS — R94.31 ABNORMAL EKG: ICD-10-CM

## 2019-12-06 DIAGNOSIS — Z01.818 PREOPERATIVE EXAMINATION: Primary | ICD-10-CM

## 2019-12-06 NOTE — TELEPHONE ENCOUNTER
Pt called stating she cannot do walking stress test-is there another one she can do that does not require walking?  Call pt to discuss

## 2019-12-06 NOTE — TELEPHONE ENCOUNTER
LM on auth VM re A1c chek at time of pre op OV and to get stress test done and to call to set up.  Told her to inform us if she has any issues scheduling

## 2019-12-06 NOTE — TELEPHONE ENCOUNTER
She will just need A1c (which we can do in the office as a fingerstick) and she will need a stress test- let her know I looked at her EKG done in ER and it is abnormal so she will need stress test. I cancelled the labs and EKG order.

## 2019-12-06 NOTE — TELEPHONE ENCOUNTER
Spoke to pt and scheduled pre-op for   Future Appointments   Date Time Provider Courtney Desai   12/13/2019  9:00 AM Marianela Darling, DO [facility]     Pt would like to know if she still needs labs and an EKG since she just had them done in th

## 2019-12-06 NOTE — TELEPHONE ENCOUNTER
Patient notified to call to schedule Dobutamine Stress Test with [facility] CS. Pt will be seeing Dr Marek Arreola or partner also. Pt verbalizes understanding.

## 2019-12-06 NOTE — TELEPHONE ENCOUNTER
Please call patient to schedule pre-op appt with me. She needs labs and EKG a few days prior to seeing me. [facility] at [facility]. This should be done ASAP because she might need stress test prior depending on her EKG results.  Her surgery is scheduled for

## 2019-12-11 ENCOUNTER — TELEPHONE (OUTPATIENT)
Dept: CARDIOLOGY | Age: 74
End: 2019-12-11

## 2019-12-13 ENCOUNTER — TELEPHONE (OUTPATIENT)
Dept: INTERNAL MEDICINE CLINIC | Facility: CLINIC | Age: 74
End: 2019-12-13

## 2019-12-13 ENCOUNTER — OFFICE VISIT (OUTPATIENT)
Dept: INTERNAL MEDICINE CLINIC | Facility: CLINIC | Age: 74
End: 2019-12-13
Payer: COMMERCIAL

## 2019-12-13 VITALS
HEIGHT: 59.65 IN | WEIGHT: 212.38 LBS | TEMPERATURE: 98 F | DIASTOLIC BLOOD PRESSURE: 72 MMHG | RESPIRATION RATE: 16 BRPM | BODY MASS INDEX: 41.69 KG/M2 | HEART RATE: 66 BPM | SYSTOLIC BLOOD PRESSURE: 138 MMHG

## 2019-12-13 DIAGNOSIS — M96.1 FAILED BACK SYNDROME: ICD-10-CM

## 2019-12-13 DIAGNOSIS — R94.31 ABNORMAL EKG: Primary | ICD-10-CM

## 2019-12-13 DIAGNOSIS — Z01.818 PREOPERATIVE EXAMINATION: Primary | ICD-10-CM

## 2019-12-13 PROCEDURE — 99214 OFFICE O/P EST MOD 30 MIN: CPT | Performed by: FAMILY MEDICINE

## 2019-12-13 RX ORDER — DILTIAZEM HYDROCHLORIDE 240 MG/1
CAPSULE, COATED, EXTENDED RELEASE ORAL
COMMUNITY
Start: 2019-11-11 | End: 2019-12-13

## 2019-12-13 NOTE — TELEPHONE ENCOUNTER
Please call patient. Per surgeon, patient needs to be seen by Cardiologist for cardiac clearance.  Therefore I am canceling the stress test order and patient needs to call Dr. Lana Gibbons office (or the most recent Cardiologist she has seen) and make appt wit

## 2019-12-13 NOTE — TELEPHONE ENCOUNTER
Irena Cortez from Dr. Lana Gibbons (Cardiologist) office stated they haven't seen this pt in years and not sure what to order for her. She also noted that they may need to do their own evaluation. Please advise.

## 2019-12-13 NOTE — TELEPHONE ENCOUNTER
Patient states she spoke with Dr Jamey Graham office and was told no appt availability with any Cardiologists until after the first of the year. AMS, do you want pt to try to schedule with another group of Cardiologists? Please advise.

## 2019-12-13 NOTE — TELEPHONE ENCOUNTER
Spoke to pt, notified Stress Echo was cancelled for Monday because Dr Calvin Shepard office would not be sure of the correct test until pt is seen in the office by one of their Cardiologists.   Pt verbalizes understanding and will call Dr Johny Jacobo office to sche

## 2019-12-14 NOTE — TELEPHONE ENCOUNTER
Please call any Cardiology group to see if they can fit patient in ASAP.  If not, call Dr. Omalley Chimera office (her surgeon) to let them know since they are requesting she see Cardiologist.

## 2019-12-14 NOTE — PROGRESS NOTES
HPI:    Patient ID: Jazmine Vanessa is a 76year old female. HPI Here for preoperative exam for planned T12 laminectomy, placement of 2 epidural electrodes, spinal cord stimulator and battery left flank with Dr. Jacki Gibbs scheduled for 12/23/19.  Patient has 6/20/2013    Log Date: 10/10/2011    • Unspecified disorder of thyroid    • Valvular disease     MVP   • Visual impairment     glasses     Past Surgical History:   Procedure Laterality Date   • ANTERIOR POSTERIOR REPAIR N/A 10/31/2013    Performed by Jennifer Rdz, SACROILIAC JOINT INJECTION RIGHT OR LEFT Right 1/8/2015    Performed by Marbella Bonilla MD at [facility] MAIN OR   • SI JOINT INJECTION N/A 6/9/2014    Performed by Susie Maldonado MD at [address] [place] [address]   • SI JOINT INJECTION Right 5/16/2014    Pe bruise/bleed easily. Psychiatric/Behavioral: Negative for dysphoric mood. The patient is not nervous/anxious.              Current Outpatient Medications   Medication Sig Dispense Refill   • dilTIAZem HCl  MG Oral Capsule SR 24 Hr Take 1 capsule (24 OR) Take  by mouth daily. • Glucosamine 500 MG Oral Tab Take  by mouth daily.        Allergies:  Vicodin [Hydrocodon*    NAUSEA AND VOMITING, DIZZINESS,                            HALLUCINATION  Tramadol                NAUSEA AND VOMITING   PHYSICAL EXA

## 2019-12-16 ENCOUNTER — TELEPHONE (OUTPATIENT)
Dept: INTERNAL MEDICINE CLINIC | Facility: CLINIC | Age: 74
End: 2019-12-16

## 2019-12-16 NOTE — TELEPHONE ENCOUNTER
Clearing pt for surg-there is someconfusion-AMS stated pt needs cardiac clearance per Dr. Annmarie Heck and [facility] from Dr. Annmarie Heck office stated they do not need patient to get cardiac clearance or a stress test done-please call [facility] back today to confirm that cody

## 2019-12-16 NOTE — TELEPHONE ENCOUNTER
I spoke with Alexis Bergeron MA, states they are not requiring Cardiac clearance for patient's upcoming surgery, she indicates patient called their office asking them to fax a request cardiac clearance faxed to our office.  But they are not requesting this

## 2019-12-16 NOTE — TELEPHONE ENCOUNTER
Initial visit with Dave, INDERJITTRACEY and grandparents.    Breastfeeding general information reviewed.   Advised to breastfeed exclusively, on demand, avoid pacifiers, bottles and formula unless medically indicated.  Encouraged rooming in, skin to skin, feeding on demand 8-12x/day or sooner if baby cues.  Explained benefits of holding and skin to skin.  Encouraged lots of skin to skin. Instructed on hand expression. Questions answered regarding pumping and physiology of milk supply and production.  Reviewed positioning and how to roll towels to help support Dave's wrist/arm.  Outpatient resource phone numbers given.  Has a breast pump.    Continues to nurse well per mom. No further questions at this time.   Will follow as needed.   Catalina Crandall BSN, RN, PHN, RNC-MNN, IBCLC     LM on VM for Thania riojas, to call back to discuss this.

## 2019-12-16 NOTE — TELEPHONE ENCOUNTER
Tommie Carpenter  11:05 AM                Clearing pt for surg-there is someconfusion-AMS stated pt needs cardiac clearance per Dr. Doug Ernandez and [place] from Dr. Doug Ernandez office stated they do not need patient to get cardiac clearance or a stress test done-isrrael

## 2019-12-16 NOTE — TELEPHONE ENCOUNTER
Spoke with patient aware to call CS to schedule Card ECHO test. Patient verbalized understanding and agreeable to POC.

## 2019-12-16 NOTE — TELEPHONE ENCOUNTER
Received paperwork from [facility] office, placed on [employer] desk for review. Stress test pended for your review and approval. please advise. thinking about quitting

## 2019-12-16 NOTE — TELEPHONE ENCOUNTER
I would just verify this is the case because on Care Everywhere we can see that Dr. Dawna Cummins office has called Dr. Kvng Escalera office for cardiac clearance. We can re-order stress test if patient doesn't need to see Cardiology.  Go ahead and re-order same st

## 2019-12-17 NOTE — TELEPHONE ENCOUNTER
Spoke with patient notified her Card Echo is scheduled for Friday at 12:45pm [facility], she should fast 3 hours prior, water ok, no caffeine, nicotine including chocolate, tea, decaff beverages, Excedrin 12 hours prior.  Patient will call [facility]

## 2019-12-17 NOTE — TELEPHONE ENCOUNTER
Patient is unable to schedule echo with mac prior to when she needs it. Can you please call [facility] scheduling to get patient in sooner. She has surgery on 12/23.   Currently she has an appointment on [facility] cardio at 10:30 and will need prior au

## 2019-12-23 ENCOUNTER — HOSPITAL ENCOUNTER (OUTPATIENT)
Dept: CV DIAGNOSTICS | Facility: HOSPITAL | Age: 74
Discharge: HOME | End: 2019-12-23
Attending: FAMILY MEDICINE
Payer: COMMERCIAL

## 2019-12-23 DIAGNOSIS — R94.31 ABNORMAL EKG: ICD-10-CM

## 2019-12-23 PROCEDURE — 93017 CV STRESS TEST TRACING ONLY: CPT | Performed by: FAMILY MEDICINE

## 2019-12-23 PROCEDURE — 93350 STRESS TTE ONLY: CPT | Performed by: FAMILY MEDICINE

## 2019-12-23 PROCEDURE — 93018 CV STRESS TEST I&R ONLY: CPT | Performed by: FAMILY MEDICINE

## 2019-12-23 RX ORDER — DOBUTAMINE HYDROCHLORIDE 200 MG/100ML
INJECTION INTRAVENOUS
Status: COMPLETED
Start: 2019-12-23 | End: 2019-12-23

## 2019-12-23 RX ADMIN — DOBUTAMINE HYDROCHLORIDE 2 MCG/KG/MIN: 200 INJECTION INTRAVENOUS at 11:15:00

## 2020-01-17 RX ORDER — LEVOTHYROXINE SODIUM 50 MCG
TABLET ORAL
Qty: 90 TABLET | Refills: 0 | Status: SHIPPED | OUTPATIENT
Start: 2020-01-17 | End: 2020-03-17

## 2020-01-17 RX ORDER — TRIAMTERENE AND HYDROCHLOROTHIAZIDE 37.5; 25 MG/1; MG/1
1 TABLET ORAL DAILY
Qty: 90 TABLET | Refills: 0 | Status: SHIPPED | OUTPATIENT
Start: 2020-01-17 | End: 2020-06-11

## 2020-01-17 RX ORDER — DILTIAZEM HYDROCHLORIDE 240 MG/1
CAPSULE, COATED, EXTENDED RELEASE ORAL
Qty: 90 CAPSULE | Refills: 0 | Status: SHIPPED | OUTPATIENT
Start: 2020-01-17 | End: 2020-06-11

## 2020-01-17 RX ORDER — METFORMIN HYDROCHLORIDE 500 MG/1
TABLET, EXTENDED RELEASE ORAL
Qty: 180 TABLET | Refills: 0 | Status: SHIPPED | OUTPATIENT
Start: 2020-01-17 | End: 2020-06-11

## 2020-01-17 RX ORDER — LOSARTAN POTASSIUM 50 MG/1
TABLET ORAL
Qty: 90 TABLET | Refills: 0 | Status: SHIPPED | OUTPATIENT
Start: 2020-01-17 | End: 2020-06-11

## 2020-01-17 NOTE — TELEPHONE ENCOUNTER
LOV: 12/13/19-pre-op  Last CPE:5/17/19  Last refill:  dilTIAZem HCl  MG Oral Capsule SR 24 Hr 90 capsule 0 11/12/2019     Levothyroxine Sodium (SYNTHROID) 50 MCG Oral Tab 90 tablet 0 11/12/2019     metFORMIN HCl  MG Oral Tablet 24 Hr 180 tablet

## 2020-03-02 ENCOUNTER — TELEPHONE (OUTPATIENT)
Dept: INTERNAL MEDICINE CLINIC | Facility: CLINIC | Age: 75
End: 2020-03-02

## 2020-03-02 NOTE — TELEPHONE ENCOUNTER
Pt states her insurance will not now cover SYNTHROID 50 MCG Oral Tab brand name-they will be sending you prior auth form for AMS to fill out so she can get the name brand.

## 2020-03-05 NOTE — TELEPHONE ENCOUNTER
Fax received from [facility]-Notice of Denial for lack of information. Placed in [address] box for review.  Send to scan after

## 2020-03-13 ENCOUNTER — TELEPHONE (OUTPATIENT)
Dept: INTERNAL MEDICINE CLINIC | Facility: CLINIC | Age: 75
End: 2020-03-13

## 2020-03-13 NOTE — TELEPHONE ENCOUNTER
Please call patient to let her know her insurance will only cover generic Synthroid and we will switch her to this. Reassure her this works just as well.

## 2020-03-13 NOTE — TELEPHONE ENCOUNTER
Received paperwork from [employer] in regards to PA for name brand Synthroid. If OK to proceed with PA, please adivse on why pt should be on name brand vs generic for evidence to support PA for possible approval.    FWD to AMS.

## 2020-03-17 RX ORDER — LEVOTHYROXINE SODIUM 50 MCG
50 TABLET ORAL
Qty: 90 TABLET | Refills: 0 | Status: SHIPPED | OUTPATIENT
Start: 2020-03-17 | End: 2020-03-17

## 2020-03-17 RX ORDER — LEVOTHYROXINE SODIUM 50 MCG
50 TABLET ORAL
Qty: 90 TABLET | Refills: 0 | Status: SHIPPED | OUTPATIENT
Start: 2020-03-17 | End: 2020-07-03

## 2020-03-17 NOTE — TELEPHONE ENCOUNTER
PT states she will not take generic due to taking generics in the past and are not successful. Pt stats she has gone through this multiple times. Pt states she is only requesting synthroid she has been on that helped. AMS please advise.

## 2020-03-19 NOTE — TELEPHONE ENCOUNTER
Spoke with patient, pt would like to stay on synthroid. Pt will call [facility] to discuss cost and call us back.

## 2020-04-13 NOTE — TELEPHONE ENCOUNTER
Last Ov: 12/13/19, AMS, pre op  Upcoming appt: no upcoming appt  Last labs: UA w Cx, CBC, BMP 12/3/19  Last Rx: lyrica 100mg, #180, 1R 9/23/19

## 2020-04-14 ENCOUNTER — TELEPHONE (OUTPATIENT)
Dept: INTERNAL MEDICINE CLINIC | Facility: CLINIC | Age: 75
End: 2020-04-14

## 2020-04-15 RX ORDER — PREGABALIN 100 MG/1
100 CAPSULE ORAL 2 TIMES DAILY
Qty: 180 CAPSULE | Refills: 0 | Status: SHIPPED | OUTPATIENT
Start: 2020-04-15 | End: 2020-04-28

## 2020-04-15 NOTE — TELEPHONE ENCOUNTER
Raheel Clemons (tech) from [facility] ([phone]) stated the below medication was not covered under her insurance. Want to know if generic is option. If so, needs a new order. Please advise.     LYRICA 100 MG Oral Cap 180 capsule 0 4/13/2020

## 2020-04-16 ENCOUNTER — TELEPHONE (OUTPATIENT)
Dept: INTERNAL MEDICINE CLINIC | Facility: CLINIC | Age: 75
End: 2020-04-16

## 2020-04-16 NOTE — TELEPHONE ENCOUNTER
AD sent in rx for pregabalin (LYRICA) 100 MG Oral Cap-insurance wants her to get the generic of this med which does not work for her-pharm said AD has to get PA for name brand

## 2020-04-20 NOTE — TELEPHONE ENCOUNTER
Pt was told by insurance she is to be prescribed a generic. Pt tried it in the past, 2002 and 2003, she was given after surgery,  and knows it does not work for her. Pt is in a lot of pain with her legs. And has been out of the script since last week.

## 2020-04-20 NOTE — TELEPHONE ENCOUNTER
Generic has not helped pt in the past. Awaiting PA for name brand. Pt updated on plan of care. Will notify patient when we receive approval or denial. Pt states she will await response. RUTH DUONG.

## 2020-04-24 NOTE — TELEPHONE ENCOUNTER
Patient is still waiting for this PA to go through on the Lyrica. Patient is not feeling well because she has been out of this medication.   Please advise

## 2020-04-24 NOTE — TELEPHONE ENCOUNTER
Paperwork received that PA was denied for lyrica cap 100mg. Pt notified. AMS please advise.      [address] #[phone]

## 2020-04-24 NOTE — TELEPHONE ENCOUNTER
We have not received update from insurance at this time. MA resubmitted PA through covermymeds. LMTCB on pts cell phone to update patient.

## 2020-04-24 NOTE — TELEPHONE ENCOUNTER
Patient has no interest in trying generic due to failed improvement in the past. Pt unsure of out of pocket cost. Pt states the only medication that has helped thus far is lyrica. AMS please advise.

## 2020-04-27 NOTE — TELEPHONE ENCOUNTER
Patient states gabapentin does not help, nor has any other medication at this time. Pt states she seen pain specialist who tell her surgery is needed to improve her pain.  Patient is calling her insurance company and will call back pending their conversatio

## 2020-04-28 ENCOUNTER — TELEPHONE (OUTPATIENT)
Dept: INTERNAL MEDICINE CLINIC | Facility: CLINIC | Age: 75
End: 2020-04-28

## 2020-04-28 RX ORDER — PREGABALIN 100 MG/1
100 CAPSULE ORAL 2 TIMES DAILY
Qty: 180 CAPSULE | Refills: 0 | Status: SHIPPED | OUTPATIENT
Start: 2020-04-28 | End: 2020-08-04

## 2020-04-28 NOTE — TELEPHONE ENCOUNTER
Prescription needs signature. Placed on AMS desk for signature and will contact patient for pickup. FYI AMS.

## 2020-04-28 NOTE — TELEPHONE ENCOUNTER
Patient is still waiting for a PA on her lyrica. She would like to see if it is possible to get her script sent to her in the mail. She would like to take it to different pharmacies to find the cheapest one.   Please advise

## 2020-04-28 NOTE — TELEPHONE ENCOUNTER
Patient was notified of PA status denied by her insurance. Pt stated she was going to contact her insurance. Pt asking for print of her prescription to see where the cheapest location would be for her medication. AMS please advise.

## 2020-04-29 NOTE — TELEPHONE ENCOUNTER
Pt was upset she could not get script sooner. Has been trying to get this done all week. Has been unable to sleep the past few nights.

## 2020-04-30 NOTE — TELEPHONE ENCOUNTER
Patient advised script needs AMS signiture, patient instructed she will be called once this is ready for her 5/4/2020. Pt states her insurance listed amitriptyline, duloxetine, or venlafaxine ER. Would AMS recommend any other these? AMS please advise.

## 2020-04-30 NOTE — TELEPHONE ENCOUNTER
Patient states she will try different pharmacies with script and if no luck getting lyrica cheaper will consider other medication. FYI AD.

## 2020-05-04 NOTE — TELEPHONE ENCOUNTER
Rx signed. Please call patient to let her know it is ready. If she is planning to pick it up, go over travel screen with her and let her know she needs to wear a mask when she comes in. Rx is at the front.

## 2020-05-27 NOTE — TELEPHONE ENCOUNTER
Patient states that [facility] will be sending us refill request on several of her medications. Patient states that she has been on these medications for years and did not understand why there are no refills.     Explained to patient that she may need an gayle

## 2020-06-11 ENCOUNTER — TELEPHONE (OUTPATIENT)
Dept: INTERNAL MEDICINE CLINIC | Facility: CLINIC | Age: 75
End: 2020-06-11

## 2020-06-11 RX ORDER — TRIAMTERENE AND HYDROCHLOROTHIAZIDE 37.5; 25 MG/1; MG/1
1 TABLET ORAL DAILY
Qty: 90 TABLET | Refills: 0 | Status: SHIPPED | OUTPATIENT
Start: 2020-06-11 | End: 2021-08-24

## 2020-06-11 RX ORDER — METFORMIN HYDROCHLORIDE 500 MG/1
500 TABLET, EXTENDED RELEASE ORAL 2 TIMES DAILY
Qty: 180 TABLET | Refills: 0 | Status: SHIPPED | OUTPATIENT
Start: 2020-06-11 | End: 2020-06-11

## 2020-06-11 RX ORDER — LIDOCAINE 50 MG/G
3 PATCH TOPICAL EVERY 24 HOURS
Qty: 270 PATCH | Refills: 1 | Status: SHIPPED | OUTPATIENT
Start: 2020-06-11 | End: 2021-10-29

## 2020-06-11 RX ORDER — DILTIAZEM HYDROCHLORIDE 240 MG/1
240 CAPSULE, COATED, EXTENDED RELEASE ORAL DAILY
Qty: 90 CAPSULE | Refills: 0 | Status: SHIPPED | OUTPATIENT
Start: 2020-06-11 | End: 2020-09-08

## 2020-06-11 RX ORDER — DILTIAZEM HYDROCHLORIDE 240 MG/1
240 CAPSULE, COATED, EXTENDED RELEASE ORAL DAILY
Refills: 0 | Status: CANCELLED | OUTPATIENT
Start: 2020-06-11

## 2020-06-11 RX ORDER — TRIAMTERENE AND HYDROCHLOROTHIAZIDE 37.5; 25 MG/1; MG/1
1 TABLET ORAL DAILY
Qty: 90 TABLET | Refills: 0 | Status: SHIPPED | OUTPATIENT
Start: 2020-06-11 | End: 2020-06-11

## 2020-06-11 RX ORDER — METFORMIN HYDROCHLORIDE 500 MG/1
500 TABLET, EXTENDED RELEASE ORAL 2 TIMES DAILY
Qty: 180 TABLET | Refills: 0 | Status: SHIPPED | OUTPATIENT
Start: 2020-06-11

## 2020-06-11 RX ORDER — LOSARTAN POTASSIUM 50 MG/1
50 TABLET ORAL DAILY
Qty: 90 TABLET | Refills: 0 | Status: SHIPPED | OUTPATIENT
Start: 2020-06-11 | End: 2020-06-11

## 2020-06-11 RX ORDER — LOSARTAN POTASSIUM 50 MG/1
50 TABLET ORAL DAILY
Qty: 90 TABLET | Refills: 0 | Status: SHIPPED | OUTPATIENT
Start: 2020-06-11 | End: 2020-09-08

## 2020-06-11 RX ORDER — DILTIAZEM HYDROCHLORIDE 240 MG/1
240 CAPSULE, COATED, EXTENDED RELEASE ORAL DAILY
Qty: 90 CAPSULE | Refills: 0 | Status: SHIPPED | OUTPATIENT
Start: 2020-06-11 | End: 2020-06-11

## 2020-06-11 NOTE — TELEPHONE ENCOUNTER
AMS-patient had labs completed in 10/4/2019, would you like patient to repeat labs for upcoming OV? Please advise.

## 2020-06-11 NOTE — TELEPHONE ENCOUNTER
Future Appointments   Date Time Provider Courtney Giselle   7/3/2020 10:00 AM Annette Seo,  [facility]     For CPE     Pt would like fasting labs sent to [facility] pls. Pt aware to complete 5-7 days ahead.

## 2020-06-11 NOTE — TELEPHONE ENCOUNTER
We can just do A1c in-office while she's here. Please call patient to let her know she does not need any labs prior to her appt.

## 2020-06-11 NOTE — TELEPHONE ENCOUNTER
LOV:12/13/19  Last CPE:5/17/19  Last refill:  DILTIAZEM HCL ER COATED BEADS 240 MG Oral Capsule SR 24 Hr 90 capsule 0 1/17/2020     lidocaine 5 % External Patch 270 patch 1 9/23/2019   Last labs that are related: cmp, cbc 12/03/19  Future appointment:  Mckayla Green

## 2020-06-11 NOTE — TELEPHONE ENCOUNTER
Pharmacy never requested refills.     Pt is out of     DILTIAZEM HCL ER COATED BEADS 240 MG Oral Capsule SR 24 Hr    Would like a 7 day supply sent to local.    Pt also needs     lidocaine 5 % External Patch    Is also asking that all the script that were f

## 2020-07-03 ENCOUNTER — OFFICE VISIT (OUTPATIENT)
Dept: INTERNAL MEDICINE CLINIC | Facility: CLINIC | Age: 75
End: 2020-07-03
Payer: COMMERCIAL

## 2020-07-03 VITALS
WEIGHT: 200 LBS | SYSTOLIC BLOOD PRESSURE: 122 MMHG | HEIGHT: 59.65 IN | DIASTOLIC BLOOD PRESSURE: 78 MMHG | HEART RATE: 80 BPM | RESPIRATION RATE: 18 BRPM | BODY MASS INDEX: 39.27 KG/M2 | TEMPERATURE: 97 F

## 2020-07-03 DIAGNOSIS — E11.9 TYPE 2 DIABETES MELLITUS WITHOUT COMPLICATION, WITHOUT LONG-TERM CURRENT USE OF INSULIN (HCC): ICD-10-CM

## 2020-07-03 DIAGNOSIS — I10 ESSENTIAL HYPERTENSION: ICD-10-CM

## 2020-07-03 DIAGNOSIS — E03.9 HYPOTHYROIDISM, UNSPECIFIED TYPE: ICD-10-CM

## 2020-07-03 DIAGNOSIS — Z00.00 ANNUAL PHYSICAL EXAM: Primary | ICD-10-CM

## 2020-07-03 LAB
CARTRIDGE LOT#: 564 NUMERIC
HEMOGLOBIN A1C: 6.8 % (ref 4.3–5.6)

## 2020-07-03 PROCEDURE — 83036 HEMOGLOBIN GLYCOSYLATED A1C: CPT | Performed by: FAMILY MEDICINE

## 2020-07-03 PROCEDURE — 99397 PER PM REEVAL EST PAT 65+ YR: CPT | Performed by: FAMILY MEDICINE

## 2020-07-03 RX ORDER — LEVOTHYROXINE SODIUM 50 MCG
50 TABLET ORAL
Qty: 90 TABLET | Refills: 1 | Status: SHIPPED | OUTPATIENT
Start: 2020-07-03 | End: 2021-01-18

## 2020-07-03 NOTE — PROGRESS NOTES
HPI:    Patient ID: Jan Mcgowan is a 76year old female. HPI Here for annual check-up. Taking all meds as prescribed with no issues. Is dealing with continued back pain and planning surgery again.      Past Medical History:   Diagnosis Date   • [facility] 10/31/2013    Performed by Sixto Kerns MD at [facility] MAIN OR   • CAUDAL N/A 4/17/2013    Performed by Florence Raymond MD at [address] [place] [address] Ave N/A 3/22/2013    Performed by Ana Laura England MD at [address] [place] [address]   • CAU INJECTION Right 5/16/2014    Performed by Saqib Carrasco MD at [address] [place] [address] TRIAL N/A 11/12/2019    Performed by Taylor Hughes MD at [facility] ENDOSCOPY   • SPINAL HARDWARE REMOVAL N/A 6/14/2013    Performed by CHIQUI apply Kit Use to check blood sugars BID. Please include appropriate brand test strips x 100. 1 kit 0   • SYNTHROID 50 MCG Oral Tab Take 1 tablet (50 mcg total) by mouth once daily.  90 tablet 1   • lidocaine 5 % External Patch Place 3 patches onto the skin mouth daily. • Probiotic Product (PROBIOTIC OR) Take  by mouth daily. • Glucosamine 500 MG Oral Tab Take  by mouth daily.        Allergies:  Vicodin [Hydrocodon*    NAUSEA AND VOMITING, DIZZINESS,                            HALLUCINATION  Tramadol

## 2020-07-06 RX ORDER — BLOOD SUGAR DIAGNOSTIC
STRIP MISCELLANEOUS
Qty: 100 STRIP | Refills: 1 | Status: SHIPPED | OUTPATIENT
Start: 2020-07-06

## 2020-07-06 RX ORDER — LANCETS 33 GAUGE
EACH MISCELLANEOUS
Qty: 100 EACH | Refills: 1 | Status: SHIPPED | OUTPATIENT
Start: 2020-07-06

## 2020-07-06 NOTE — TELEPHONE ENCOUNTER
Fax received from [facility] requesting OneTouch Verio test strips and lancets    LOV:7/3/20, CPE, AMS  Last CPE:7/30/20  Last refill:  Unknown-one touch verio test strips and lancets  Last labs that are related:7/03/20  Future appointment:No future appoint

## 2020-08-04 RX ORDER — PREGABALIN 100 MG/1
100 CAPSULE ORAL 2 TIMES DAILY
Qty: 180 CAPSULE | Refills: 1 | Status: SHIPPED | OUTPATIENT
Start: 2020-08-04 | End: 2021-02-19

## 2020-08-04 NOTE — TELEPHONE ENCOUNTER
Last Ov:7/3/20  Upcoming appt:none  Last labs:7/3/20 A1C  Last Rx:4/28/20 pregabalin 100mg    Per Protocol sent for review

## 2020-09-08 RX ORDER — METHOCARBAMOL 750 MG/1
750 TABLET, FILM COATED ORAL 3 TIMES DAILY
Qty: 270 TABLET | Refills: 0 | Status: SHIPPED | OUTPATIENT
Start: 2020-09-08 | End: 2021-04-26

## 2020-09-08 RX ORDER — DILTIAZEM HYDROCHLORIDE 240 MG/1
240 CAPSULE, COATED, EXTENDED RELEASE ORAL DAILY
Qty: 90 CAPSULE | Refills: 0 | Status: SHIPPED | OUTPATIENT
Start: 2020-09-08 | End: 2020-12-07

## 2020-09-08 RX ORDER — LOSARTAN POTASSIUM 50 MG/1
50 TABLET ORAL DAILY
Qty: 90 TABLET | Refills: 0 | Status: SHIPPED | OUTPATIENT
Start: 2020-09-08 | End: 2020-12-07

## 2020-09-08 NOTE — TELEPHONE ENCOUNTER
LOV:07/03/20, CPE, AMS  Last CPE: 07/03/20, CPE  Last refill:  dilTIAZem HCl ER Coated Beads 240 MG Oral Capsule SR 24 Hr 90 capsule 0 6/11/2020     methocarbamol 750 MG Oral Tab 270 tablet 0 8/16/2019     losartan Potassium 50 MG Oral Tab 90 tablet 0 6/11

## 2020-10-22 ENCOUNTER — TELEPHONE (OUTPATIENT)
Dept: INTERNAL MEDICINE CLINIC | Facility: CLINIC | Age: 75
End: 2020-10-22

## 2020-10-22 NOTE — TELEPHONE ENCOUNTER
Received and abstracted diabetic eye test results from visit on 10/16/2020. Placed in AMS bin to review.

## 2020-12-07 RX ORDER — DILTIAZEM HYDROCHLORIDE 240 MG/1
240 CAPSULE, COATED, EXTENDED RELEASE ORAL DAILY
Qty: 90 CAPSULE | Refills: 0 | Status: SHIPPED | OUTPATIENT
Start: 2020-12-07 | End: 2021-08-24

## 2020-12-07 RX ORDER — LOSARTAN POTASSIUM 50 MG/1
50 TABLET ORAL DAILY
Qty: 90 TABLET | Refills: 0 | Status: SHIPPED | OUTPATIENT
Start: 2020-12-07 | End: 2021-04-26

## 2020-12-07 NOTE — TELEPHONE ENCOUNTER
LOV:07/03/20, CPE, AMS  Last CPE:7/3/20, CPE, AMS  Last refill:  DILTIAZEM HCL ER COATED BEADS 240 MG Oral Capsule SR 24 Hr 90 capsule 0 9/8/2020     LOSARTAN POTASSIUM 50 MG Oral Tab 90 tablet 0 9/8/2020     Last labs that are related: 7/3/20 a1c, no othe

## 2021-01-18 DIAGNOSIS — Z13.0 SCREENING FOR DISORDER OF BLOOD AND BLOOD-FORMING ORGANS: ICD-10-CM

## 2021-01-18 DIAGNOSIS — Z13.220 SCREENING FOR LIPID DISORDERS: ICD-10-CM

## 2021-01-18 DIAGNOSIS — E03.9 HYPOTHYROIDISM, UNSPECIFIED TYPE: Primary | ICD-10-CM

## 2021-01-18 DIAGNOSIS — Z13.228 SCREENING FOR METABOLIC DISORDER: ICD-10-CM

## 2021-01-18 RX ORDER — LEVOTHYROXINE SODIUM 50 MCG
50 TABLET ORAL
Qty: 90 TABLET | Refills: 0 | Status: SHIPPED | OUTPATIENT
Start: 2021-01-18 | End: 2021-02-19

## 2021-01-18 NOTE — TELEPHONE ENCOUNTER
Please call patient to schedule 6-month BP f/u visit. She is due for fasting labs as well- [facility].

## 2021-01-18 NOTE — TELEPHONE ENCOUNTER
LOV:07/03/20, CPE, AMS  Last CPE:7/3/20  Last refill:SYNTHROID 50 MCG Oral Tab,7/3/20  Quantity:90 tablet, 1 refill  Last labs that are related: cbc, bmp   Future appointment:No future appointments.   Protocol: pend for provider    Please approve or deny, a

## 2021-01-29 NOTE — TELEPHONE ENCOUNTER
Future Appointments   Date Time Provider Courtney Desai   2/12/2021  9:00 AM REFERENCE [id number]   2/19/2021  9:00 AM Carols Alberto Maldonado,  [facility]

## 2021-02-02 DIAGNOSIS — Z23 NEED FOR VACCINATION: ICD-10-CM

## 2021-02-12 ENCOUNTER — LAB ENCOUNTER (OUTPATIENT)
Dept: LAB | Age: 76
End: 2021-02-12
Attending: FAMILY MEDICINE
Payer: MEDICARE

## 2021-02-12 DIAGNOSIS — E03.9 HYPOTHYROIDISM, UNSPECIFIED TYPE: ICD-10-CM

## 2021-02-12 DIAGNOSIS — E11.9 TYPE 2 DIABETES MELLITUS WITHOUT COMPLICATION, WITHOUT LONG-TERM CURRENT USE OF INSULIN (HCC): ICD-10-CM

## 2021-02-12 DIAGNOSIS — E11.9 TYPE 2 DIABETES MELLITUS WITHOUT COMPLICATION, WITHOUT LONG-TERM CURRENT USE OF INSULIN (HCC): Primary | ICD-10-CM

## 2021-02-12 DIAGNOSIS — Z13.0 SCREENING FOR DISORDER OF BLOOD AND BLOOD-FORMING ORGANS: ICD-10-CM

## 2021-02-12 DIAGNOSIS — Z13.220 SCREENING FOR LIPID DISORDERS: ICD-10-CM

## 2021-02-12 DIAGNOSIS — Z13.228 SCREENING FOR METABOLIC DISORDER: ICD-10-CM

## 2021-02-12 LAB
ANION GAP SERPL CALC-SCNC: 7 MMOL/L (ref 0–18)
BASOPHILS # BLD AUTO: 0.02 X10(3) UL (ref 0–0.2)
BASOPHILS NFR BLD AUTO: 0.2 %
BUN BLD-MCNC: 20 MG/DL (ref 7–18)
BUN/CREAT SERPL: 21.3 (ref 10–20)
CALCIUM BLD-MCNC: 10.7 MG/DL (ref 8.5–10.1)
CHLORIDE SERPL-SCNC: 104 MMOL/L (ref 98–112)
CHOLEST SMN-MCNC: 176 MG/DL (ref ?–200)
CO2 SERPL-SCNC: 28 MMOL/L (ref 21–32)
CREAT BLD-MCNC: 0.94 MG/DL
DEPRECATED RDW RBC AUTO: 42.5 FL (ref 35.1–46.3)
EOSINOPHIL # BLD AUTO: 0.15 X10(3) UL (ref 0–0.7)
EOSINOPHIL NFR BLD AUTO: 1.7 %
ERYTHROCYTE [DISTWIDTH] IN BLOOD BY AUTOMATED COUNT: 12.5 % (ref 11–15)
EST. AVERAGE GLUCOSE BLD GHB EST-MCNC: 154 MG/DL (ref 68–126)
GLUCOSE BLD-MCNC: 156 MG/DL (ref 70–99)
HBA1C MFR BLD HPLC: 7 % (ref ?–5.7)
HCT VFR BLD AUTO: 43.5 %
HDLC SERPL-MCNC: 44 MG/DL (ref 40–59)
HGB BLD-MCNC: 14.3 G/DL
IMM GRANULOCYTES # BLD AUTO: 0.02 X10(3) UL (ref 0–1)
IMM GRANULOCYTES NFR BLD: 0.2 %
LDLC SERPL CALC-MCNC: 96 MG/DL (ref ?–100)
LYMPHOCYTES # BLD AUTO: 3.47 X10(3) UL (ref 1–4)
LYMPHOCYTES NFR BLD AUTO: 39.7 %
MCH RBC QN AUTO: 30.4 PG (ref 26–34)
MCHC RBC AUTO-ENTMCNC: 32.9 G/DL (ref 31–37)
MCV RBC AUTO: 92.6 FL
MONOCYTES # BLD AUTO: 0.82 X10(3) UL (ref 0.1–1)
MONOCYTES NFR BLD AUTO: 9.4 %
NEUTROPHILS # BLD AUTO: 4.25 X10 (3) UL (ref 1.5–7.7)
NEUTROPHILS # BLD AUTO: 4.25 X10(3) UL (ref 1.5–7.7)
NEUTROPHILS NFR BLD AUTO: 48.8 %
NONHDLC SERPL-MCNC: 132 MG/DL (ref ?–130)
OSMOLALITY SERPL CALC.SUM OF ELEC: 294 MOSM/KG (ref 275–295)
PATIENT FASTING Y/N/NP: YES
PATIENT FASTING Y/N/NP: YES
PLATELET # BLD AUTO: 223 10(3)UL (ref 150–450)
POTASSIUM SERPL-SCNC: 4 MMOL/L (ref 3.5–5.1)
RBC # BLD AUTO: 4.7 X10(6)UL
SODIUM SERPL-SCNC: 139 MMOL/L (ref 136–145)
T3FREE SERPL-MCNC: 2.69 PG/ML (ref 2.4–4.2)
T4 FREE SERPL-MCNC: 1.2 NG/DL (ref 0.8–1.7)
TRIGL SERPL-MCNC: 180 MG/DL (ref 30–149)
TSI SER-ACNC: 0.31 MIU/ML (ref 0.36–3.74)
VLDLC SERPL CALC-MCNC: 36 MG/DL (ref 0–30)
WBC # BLD AUTO: 8.7 X10(3) UL (ref 4–11)

## 2021-02-12 PROCEDURE — 84439 ASSAY OF FREE THYROXINE: CPT

## 2021-02-12 PROCEDURE — 36415 COLL VENOUS BLD VENIPUNCTURE: CPT

## 2021-02-12 PROCEDURE — 84481 FREE ASSAY (FT-3): CPT

## 2021-02-12 PROCEDURE — 84443 ASSAY THYROID STIM HORMONE: CPT

## 2021-02-12 PROCEDURE — 80061 LIPID PANEL: CPT

## 2021-02-12 PROCEDURE — 80048 BASIC METABOLIC PNL TOTAL CA: CPT

## 2021-02-12 PROCEDURE — 83036 HEMOGLOBIN GLYCOSYLATED A1C: CPT

## 2021-02-12 PROCEDURE — 85025 COMPLETE CBC W/AUTO DIFF WBC: CPT

## 2021-02-19 ENCOUNTER — LAB ENCOUNTER (OUTPATIENT)
Dept: LAB | Age: 76
End: 2021-02-19
Attending: FAMILY MEDICINE
Payer: COMMERCIAL

## 2021-02-19 ENCOUNTER — OFFICE VISIT (OUTPATIENT)
Dept: INTERNAL MEDICINE CLINIC | Facility: CLINIC | Age: 76
End: 2021-02-19
Payer: COMMERCIAL

## 2021-02-19 VITALS
HEART RATE: 81 BPM | OXYGEN SATURATION: 96 % | HEIGHT: 59.65 IN | TEMPERATURE: 98 F | DIASTOLIC BLOOD PRESSURE: 68 MMHG | WEIGHT: 206 LBS | BODY MASS INDEX: 40.44 KG/M2 | SYSTOLIC BLOOD PRESSURE: 132 MMHG

## 2021-02-19 DIAGNOSIS — E83.52 HYPERCALCEMIA: ICD-10-CM

## 2021-02-19 DIAGNOSIS — E11.9 TYPE 2 DIABETES MELLITUS WITHOUT COMPLICATION, WITHOUT LONG-TERM CURRENT USE OF INSULIN (HCC): Primary | ICD-10-CM

## 2021-02-19 DIAGNOSIS — E11.9 TYPE 2 DIABETES MELLITUS WITHOUT COMPLICATION, WITHOUT LONG-TERM CURRENT USE OF INSULIN (HCC): ICD-10-CM

## 2021-02-19 DIAGNOSIS — I10 ESSENTIAL HYPERTENSION: ICD-10-CM

## 2021-02-19 DIAGNOSIS — E03.9 HYPOTHYROIDISM, UNSPECIFIED TYPE: ICD-10-CM

## 2021-02-19 LAB
CALCIUM BLD-MCNC: 9.3 MG/DL (ref 8.5–10.1)
CREAT UR-SCNC: 40.5 MG/DL
MICROALBUMIN UR-MCNC: 0.58 MG/DL
MICROALBUMIN/CREAT 24H UR-RTO: 14.3 UG/MG (ref ?–30)
PTH-INTACT SERPL-MCNC: 51 PG/ML (ref 18.5–88)

## 2021-02-19 PROCEDURE — 99214 OFFICE O/P EST MOD 30 MIN: CPT | Performed by: FAMILY MEDICINE

## 2021-02-19 PROCEDURE — 83970 ASSAY OF PARATHORMONE: CPT

## 2021-02-19 PROCEDURE — 82570 ASSAY OF URINE CREATININE: CPT

## 2021-02-19 PROCEDURE — 82043 UR ALBUMIN QUANTITATIVE: CPT

## 2021-02-19 PROCEDURE — 3008F BODY MASS INDEX DOCD: CPT | Performed by: FAMILY MEDICINE

## 2021-02-19 PROCEDURE — 3075F SYST BP GE 130 - 139MM HG: CPT | Performed by: FAMILY MEDICINE

## 2021-02-19 PROCEDURE — 3078F DIAST BP <80 MM HG: CPT | Performed by: FAMILY MEDICINE

## 2021-02-19 PROCEDURE — 36415 COLL VENOUS BLD VENIPUNCTURE: CPT

## 2021-02-19 PROCEDURE — 82310 ASSAY OF CALCIUM: CPT

## 2021-02-19 RX ORDER — LEVOTHYROXINE SODIUM 50 MCG
50 TABLET ORAL EVERY OTHER DAY
COMMUNITY
End: 2021-07-21

## 2021-02-19 RX ORDER — LEVOTHYROXINE SODIUM 0.05 MG/1
50 TABLET ORAL EVERY OTHER DAY
COMMUNITY
End: 2021-02-19

## 2021-02-19 RX ORDER — PREGABALIN 100 MG/1
100 CAPSULE ORAL 2 TIMES DAILY
Qty: 180 CAPSULE | Refills: 1 | Status: SHIPPED | OUTPATIENT
Start: 2021-02-19 | End: 2021-10-16

## 2021-02-19 NOTE — PROGRESS NOTES
HPI:    Patient ID: Silvia Kahn is a 76year old female. HPI Here for f/u. Patient is taking all meds as prescribed with no issues. Hasn't been as physically active since it's been cold outside.    Aware she can get COVID19 vaccine and is still con Constitutional: Negative for activity change and appetite change. Respiratory: Negative for chest tightness and shortness of breath. Cardiovascular: Negative for chest pain and palpitations.    Endocrine: Negative for cold intolerance and heat intole 12 OR) Take 1,000 Units by mouth daily. • Multiple Vitamin (MULTI-VITAMIN) Oral Tab Take 1 tablet by mouth daily. • acetaminophen (TYLENOL EXTRA STRENGTH) 500 MG Oral Tab Take 2 tablets by mouth every 6 (six) hours as needed for 10 days.  100 tablet Imaging & Referrals:  None       [facility]#[id number]

## 2021-02-19 NOTE — TELEPHONE ENCOUNTER
Last Ov:2/19/21  Upcoming appt:none  Last labs:2/12/21 A1c, t3, lipid, cmp, cbc, tsh  Last Rx: 8/4/20 pregabalin    Per Protocol sent for review

## 2021-03-18 ENCOUNTER — IMMUNIZATION (OUTPATIENT)
Dept: LAB | Age: 76
End: 2021-03-18
Attending: HOSPITALIST
Payer: COMMERCIAL

## 2021-03-18 DIAGNOSIS — Z23 NEED FOR VACCINATION: Primary | ICD-10-CM

## 2021-03-18 PROCEDURE — 0001A SARSCOV2 VAC 30MCG/0.3ML IM: CPT

## 2021-03-19 RX ORDER — DILTIAZEM HYDROCHLORIDE 240 MG/1
240 CAPSULE, EXTENDED RELEASE ORAL DAILY
Qty: 90 CAPSULE | Refills: 0 | Status: SHIPPED | OUTPATIENT
Start: 2021-03-19

## 2021-03-19 NOTE — TELEPHONE ENCOUNTER
Last visit- 02/19/2021 type 2 diabetes    Last refill-  12/07/2020 diltiazem hcl er coated beads 240mg QTY90 0R    Last labs-  02/12/2021 hemoglobin a1c, free t3, t4, lipid, bmp, cbc, tsh  Future Appointments   Date Time Provider Courtney Desai   4/8/20

## 2021-03-23 ENCOUNTER — TELEPHONE (OUTPATIENT)
Dept: INTERNAL MEDICINE CLINIC | Facility: CLINIC | Age: 76
End: 2021-03-23

## 2021-03-23 NOTE — TELEPHONE ENCOUNTER
Pt called and stated that she has an appt with [facility] and they need an updated MRI of her spine     Please advise

## 2021-03-24 NOTE — TELEPHONE ENCOUNTER
Patient reports having both without and w+wo. Patient states [facility] is needing more updated imaging. Patient will have [facility] fax over documentation indicated purpose or needs for MRI for insurance documentation.  Patient will call back if any changes or update

## 2021-03-24 NOTE — TELEPHONE ENCOUNTER
We'll need documentation from them regarding exactly what imaging they need- whole spine or lumbar spine?  With and without contrast? Without contrast?

## 2021-04-08 ENCOUNTER — IMMUNIZATION (OUTPATIENT)
Dept: LAB | Age: 76
End: 2021-04-08
Attending: HOSPITALIST
Payer: COMMERCIAL

## 2021-04-08 DIAGNOSIS — Z23 NEED FOR VACCINATION: Primary | ICD-10-CM

## 2021-04-08 PROCEDURE — 0002A SARSCOV2 VAC 30MCG/0.3ML IM: CPT

## 2021-04-26 RX ORDER — METHOCARBAMOL 750 MG/1
750 TABLET, FILM COATED ORAL 3 TIMES DAILY
Qty: 270 TABLET | Refills: 0 | Status: SHIPPED | OUTPATIENT
Start: 2021-04-26

## 2021-04-26 RX ORDER — LOSARTAN POTASSIUM 50 MG/1
50 TABLET ORAL DAILY
Qty: 90 TABLET | Refills: 0 | Status: SHIPPED | OUTPATIENT
Start: 2021-04-26 | End: 2021-07-23

## 2021-04-26 NOTE — TELEPHONE ENCOUNTER
Last visit- 02/19/2021 type 2 diabetes    Last refill-  12/07/2020 losartan potassium 50mg QTY90 0R,  09/08/2020 methocarbamol 750mg [id number] 0R    Last labs-  02/12/2021 hemoglobin a1c, free t3, t4, lipid, bmp, cbc, tsh    No future appointments.

## 2021-07-21 DIAGNOSIS — E03.9 HYPOTHYROIDISM, UNSPECIFIED TYPE: Primary | ICD-10-CM

## 2021-07-21 RX ORDER — LEVOTHYROXINE SODIUM 50 MCG
TABLET ORAL
Qty: 90 TABLET | Refills: 0 | Status: SHIPPED | OUTPATIENT
Start: 2021-07-21 | End: 2021-07-23

## 2021-07-21 NOTE — TELEPHONE ENCOUNTER
Last visit- 02/19/2021 type 2 diabetes    Last refill- 01/18/2021 synthroid 50mcg QTY90 0R    Last labs-  02/12/2021 free t3, t4 free, tsh    No future appointments.

## 2021-07-23 RX ORDER — LEVOTHYROXINE SODIUM 25 MCG
25 TABLET ORAL
Qty: 90 TABLET | Refills: 0 | Status: SHIPPED | OUTPATIENT
Start: 2021-07-23 | End: 2021-10-16

## 2021-07-23 RX ORDER — LOSARTAN POTASSIUM 50 MG/1
50 TABLET ORAL DAILY
Qty: 90 TABLET | Refills: 0 | Status: SHIPPED | OUTPATIENT
Start: 2021-07-23 | End: 2021-09-08

## 2021-07-23 NOTE — TELEPHONE ENCOUNTER
Last visit- 02/19/2021 type 2 diabetes    Last refill-  04/26/2021 losartan potassium 50mg QTY90 0R    Last labs-  02/12/2021 hemoglobin a1c, free t3, t4 free, lipid, bmp, cbc, tsh    No future appointments.

## 2021-07-23 NOTE — TELEPHONE ENCOUNTER
The plan was for her to take her Synthroid 50mcg every other day- if she has still been taking it every day, I will send Synthroid 25mcg to her pharmacy and she should recheck her thyroid labs in 6-8 weeks on the lower dose. If she has been taking 50mcg every other day she can go ahead and get her labs done now.

## 2021-08-23 ENCOUNTER — LAB ENCOUNTER (OUTPATIENT)
Dept: LAB | Age: 76
End: 2021-08-23
Attending: FAMILY MEDICINE
Payer: COMMERCIAL

## 2021-08-23 DIAGNOSIS — E03.9 HYPOTHYROIDISM, UNSPECIFIED TYPE: ICD-10-CM

## 2021-08-23 LAB — TSI SER-ACNC: 0.42 MIU/ML (ref 0.36–3.74)

## 2021-08-23 PROCEDURE — 36415 COLL VENOUS BLD VENIPUNCTURE: CPT

## 2021-08-23 PROCEDURE — 84443 ASSAY THYROID STIM HORMONE: CPT

## 2021-08-24 RX ORDER — TRIAMTERENE AND HYDROCHLOROTHIAZIDE 37.5; 25 MG/1; MG/1
1 TABLET ORAL DAILY
Qty: 90 TABLET | Refills: 0 | Status: SHIPPED | OUTPATIENT
Start: 2021-08-24 | End: 2021-11-16

## 2021-08-24 RX ORDER — DILTIAZEM HYDROCHLORIDE 240 MG/1
CAPSULE, EXTENDED RELEASE ORAL
Qty: 90 CAPSULE | Refills: 0 | Status: SHIPPED | OUTPATIENT
Start: 2021-08-24 | End: 2021-11-16

## 2021-08-24 NOTE — TELEPHONE ENCOUNTER
Last visit- 02/19/2021 type 2 diabetes    Last refill- 06/11/2020 triamterene-hydrochlorothiazide 37.5-25mg QTY90 0R,    Last labs  No future appointments. Per Protocol?   Please approve or deny

## 2021-08-27 ENCOUNTER — TELEPHONE (OUTPATIENT)
Dept: PAIN CLINIC | Facility: CLINIC | Age: 76
End: 2021-08-27

## 2021-08-27 NOTE — TELEPHONE ENCOUNTER
Noted patient last seen in office 11/18/2019    Patient will need to be reevaluated by provider prior to any injections. Provider will determine if patient needs further imaging and discuss appropriate  injections at [address]

## 2021-08-27 NOTE — TELEPHONE ENCOUNTER
Pt calling asking for some additional information regarding a potential nerve block procedure that Dr. Kathrine Negro had recommended to the pt a few times before. Pt states she has had steroid injections but they have failed to help her.  Pt states that she chose no

## 2021-09-08 RX ORDER — LOSARTAN POTASSIUM 50 MG/1
TABLET ORAL
Qty: 90 TABLET | Refills: 0 | Status: SHIPPED | OUTPATIENT
Start: 2021-09-08 | End: 2021-10-29

## 2021-09-08 NOTE — TELEPHONE ENCOUNTER
Last visit- 02/19/2021 type 2 diabetes    Last refill- 07/23/2021 losartan potassium 50mg QTY90 0R    Last labs- 02/12/2021 hemoglobin a1c, free t3, t4 free, lipid, bmp, cbc, tsh  Future Appointments   Date Time Provider Courtney Desai   9/10/2021  8:00

## 2021-09-10 ENCOUNTER — OFFICE VISIT (OUTPATIENT)
Dept: PAIN CLINIC | Facility: CLINIC | Age: 76
End: 2021-09-10
Payer: MEDICARE

## 2021-09-10 VITALS
RESPIRATION RATE: 16 BRPM | HEART RATE: 75 BPM | BODY MASS INDEX: 42 KG/M2 | SYSTOLIC BLOOD PRESSURE: 140 MMHG | WEIGHT: 210 LBS | DIASTOLIC BLOOD PRESSURE: 80 MMHG | OXYGEN SATURATION: 98 %

## 2021-09-10 DIAGNOSIS — M54.16 LUMBAR RADICULITIS: ICD-10-CM

## 2021-09-10 DIAGNOSIS — Z98.1 HISTORY OF LUMBAR FUSION: Primary | ICD-10-CM

## 2021-09-10 PROCEDURE — 3079F DIAST BP 80-89 MM HG: CPT | Performed by: NURSE PRACTITIONER

## 2021-09-10 PROCEDURE — 99214 OFFICE O/P EST MOD 30 MIN: CPT | Performed by: NURSE PRACTITIONER

## 2021-09-10 PROCEDURE — 3077F SYST BP >= 140 MM HG: CPT | Performed by: NURSE PRACTITIONER

## 2021-09-10 NOTE — PROGRESS NOTES
HPI:   Librado Rai presents for follow up/she is patient of Dr. Olivia Nolan with chronic back pain/originally left LE sx and now right LE that radiates to the right thigh to the knee/she is now using the handicap scooter at the store/she lives alone/standing she needed to go to [facility] for higher level of care for additional lumbar fusion surgery, therefore she never had the SCS     2013: patient had follow up with DR. Flavia Chance and she had 3rd lumbar surgery and was told that he removed scar tissue/patient states th MANAGEMENT   • Inject nerv blck,othr periph nerv Right 6/23/2014    Procedure: LUMBAR FACET INJECTION OR MEDIAL BRANCH NERVE BLOCK;  Surgeon: Mary Lucero MD;  Location: [address] [place] [address]   • Inject nerv blck,othr periph nerv Right 6/23/201 MANAGEMENT   • Injection, w/wo contrast, dx/therapeutic substance, epidural/subarachnoid; lumbar/sacral  4/17/2013    Procedure: CAUDAL;  Surgeon: Chris Gregorio MD;  Location: [facility] FOR PAIN MANAGEMENT   • Ligation of hemorrhoid(s)      x4   • M-sed Congenital spondylolisthesis     Spondylosis of unspecified site without mention of myelopathy     Neuralgia, neuritis, and radiculitis, unspecified     DM type 2 (diabetes mellitus, type 2) ([facility] [address]     Hypertension     Hypothyroidism     Anemia     Thoracic mg total) by mouth daily. 90 capsule 0   • PREGABALIN 100 MG Oral Cap Take 1 capsule (100 mg total) by mouth 2 (two) times daily.  180 capsule 1   • OneTouch Delica Lancets 91B Does not apply Misc Use to check blood sugars  each 1   • Glucose Blood ( 75   Resp 16   Wt 210 lb (95.3 kg)   SpO2 98%   BMI 41.50 kg/m²   VAS Pain Score: 10 /10    General Appearance: Well developed, well nourished, normal build, independent body habitus, no apparent physical disabilities, well groomed, Ambulates with walker schedule the follow up on a Tuesday with Joanne: Dr. Balbina Sagastume is in office that day as well.    • >continue meds as prescribed by primary care provider  • >leg exercises: lifting/kicking while seated     No orders of the defined types were placed in this enc

## 2021-09-10 NOTE — PROGRESS NOTES
Patient presents in office today with reported pain in low back radiating down right leg     Current pain level reported = 10/10    Last reported dose of lyrica last night       Narcotic Contract renewal na    Urine Drug screen na

## 2021-09-10 NOTE — PATIENT INSTRUCTIONS
Refill policies:    • Allow 2-3 business days for refills; controlled substances may take longer.   • Contact your pharmacy at least 5 days prior to running out of medication and have them send an electronic request or submit request through the “request re Depending on your insurance carrier, approval may take 3-10 days. It is highly recommended patients contact their insurance carrier directly to determine coverage.   If test is done without insurance authorization, patient may be responsible for the entire insurance approval prior to MRI  • >schedule follow up to review imaging and to determine if any injection therapy : schedule the follow up on a Tuesday with Joanne: Dr. Kathrine Negro is in office that day as well.    • >continue meds as prescribed by primary car

## 2021-09-22 PROBLEM — G56.02 CARPAL TUNNEL SYNDROME ON LEFT: Status: ACTIVE | Noted: 2021-09-22

## 2021-10-01 ENCOUNTER — TELEPHONE (OUTPATIENT)
Dept: INTERNAL MEDICINE CLINIC | Facility: CLINIC | Age: 76
End: 2021-10-01

## 2021-10-01 DIAGNOSIS — Z00.00 ANNUAL PHYSICAL EXAM: ICD-10-CM

## 2021-10-01 DIAGNOSIS — I10 ESSENTIAL HYPERTENSION: ICD-10-CM

## 2021-10-01 DIAGNOSIS — E11.9 TYPE 2 DIABETES MELLITUS WITHOUT COMPLICATION, WITHOUT LONG-TERM CURRENT USE OF INSULIN (HCC): ICD-10-CM

## 2021-10-01 DIAGNOSIS — Z13.220 SCREENING FOR LIPID DISORDERS: ICD-10-CM

## 2021-10-01 DIAGNOSIS — Z13.228 SCREENING FOR METABOLIC DISORDER: ICD-10-CM

## 2021-10-01 DIAGNOSIS — E03.9 HYPOTHYROIDISM, UNSPECIFIED TYPE: Primary | ICD-10-CM

## 2021-10-01 DIAGNOSIS — Z13.0 SCREENING FOR DISORDER OF BLOOD AND BLOOD-FORMING ORGANS: ICD-10-CM

## 2021-10-01 NOTE — TELEPHONE ENCOUNTER
Orders to THE [facility] Pt aware to get labs done no sooner than 2 weeks prior to the appt. Pt aware to fast.  No call back required.     Future Appointments   Date Time Provider Courtney Desai   10/22/2021  9:30 AM Gautam Glasgow DO [facility]

## 2021-10-02 ENCOUNTER — HOSPITAL ENCOUNTER (OUTPATIENT)
Dept: GENERAL RADIOLOGY | Age: 76
Discharge: HOME | End: 2021-10-02
Attending: NURSE PRACTITIONER
Payer: COMMERCIAL

## 2021-10-02 ENCOUNTER — HOSPITAL ENCOUNTER (OUTPATIENT)
Dept: MRI IMAGING | Age: 76
Discharge: HOME | End: 2021-10-02
Attending: NURSE PRACTITIONER
Payer: COMMERCIAL

## 2021-10-02 DIAGNOSIS — Z98.1 HISTORY OF LUMBAR FUSION: ICD-10-CM

## 2021-10-02 PROCEDURE — 72148 MRI LUMBAR SPINE W/O DYE: CPT | Performed by: NURSE PRACTITIONER

## 2021-10-02 PROCEDURE — 72110 X-RAY EXAM L-2 SPINE 4/>VWS: CPT | Performed by: NURSE PRACTITIONER

## 2021-10-06 ENCOUNTER — TELEPHONE (OUTPATIENT)
Dept: PAIN CLINIC | Facility: CLINIC | Age: 76
End: 2021-10-06

## 2021-10-06 NOTE — TELEPHONE ENCOUNTER
Pt calling stating she completed the imaging ordered by Guzman Alvarez for an Xray and MRI. Pt is calling to discuss the results.  PSR informed her Gloria Elias is back in office on Friday and she may call the pt directly to discuss the findings and next st

## 2021-10-08 NOTE — TELEPHONE ENCOUNTER
Patient doesn't want to come into office working 12 hour days. She is aware that she had a sugery that was botched. She would like a call with results.

## 2021-10-15 ENCOUNTER — LAB ENCOUNTER (OUTPATIENT)
Dept: LAB | Age: 76
End: 2021-10-15
Attending: FAMILY MEDICINE
Payer: COMMERCIAL

## 2021-10-15 DIAGNOSIS — Z13.0 SCREENING FOR DISORDER OF BLOOD AND BLOOD-FORMING ORGANS: ICD-10-CM

## 2021-10-15 DIAGNOSIS — E11.9 TYPE 2 DIABETES MELLITUS WITHOUT COMPLICATION, WITHOUT LONG-TERM CURRENT USE OF INSULIN (HCC): ICD-10-CM

## 2021-10-15 DIAGNOSIS — Z13.220 SCREENING FOR LIPID DISORDERS: ICD-10-CM

## 2021-10-15 DIAGNOSIS — I10 ESSENTIAL HYPERTENSION: ICD-10-CM

## 2021-10-15 DIAGNOSIS — E03.9 HYPOTHYROIDISM, UNSPECIFIED TYPE: ICD-10-CM

## 2021-10-15 DIAGNOSIS — Z00.00 ANNUAL PHYSICAL EXAM: ICD-10-CM

## 2021-10-15 DIAGNOSIS — Z13.228 SCREENING FOR METABOLIC DISORDER: ICD-10-CM

## 2021-10-15 PROCEDURE — 80053 COMPREHEN METABOLIC PANEL: CPT

## 2021-10-15 PROCEDURE — 80061 LIPID PANEL: CPT

## 2021-10-15 PROCEDURE — 85025 COMPLETE CBC W/AUTO DIFF WBC: CPT

## 2021-10-15 PROCEDURE — 83036 HEMOGLOBIN GLYCOSYLATED A1C: CPT

## 2021-10-15 PROCEDURE — 82570 ASSAY OF URINE CREATININE: CPT

## 2021-10-15 PROCEDURE — 36415 COLL VENOUS BLD VENIPUNCTURE: CPT

## 2021-10-15 PROCEDURE — 84443 ASSAY THYROID STIM HORMONE: CPT

## 2021-10-15 PROCEDURE — 82043 UR ALBUMIN QUANTITATIVE: CPT

## 2021-10-16 RX ORDER — LEVOTHYROXINE SODIUM 25 MCG
25 TABLET ORAL
Qty: 90 TABLET | Refills: 0 | Status: SHIPPED | OUTPATIENT
Start: 2021-10-16 | End: 2022-01-31

## 2021-10-16 NOTE — TELEPHONE ENCOUNTER
Last visit- 02/19/2021 type 2 diabetes     Last refill- 02/19/2021 pregabalin 100mg [id number] 1R,  07/23/2021 synthroid 25mcg QTY90 0R      Last labs- 10/15/2021 lipid, cmp, cbc, tsh, hemoglobin a1c, microalb   Future Appointments   Date Time Provider Departm

## 2021-10-22 ENCOUNTER — OFFICE VISIT (OUTPATIENT)
Dept: INTERNAL MEDICINE CLINIC | Facility: CLINIC | Age: 76
End: 2021-10-22
Payer: COMMERCIAL

## 2021-10-22 VITALS
DIASTOLIC BLOOD PRESSURE: 70 MMHG | WEIGHT: 205 LBS | OXYGEN SATURATION: 98 % | BODY MASS INDEX: 40.25 KG/M2 | SYSTOLIC BLOOD PRESSURE: 134 MMHG | HEART RATE: 70 BPM | HEIGHT: 59.65 IN

## 2021-10-22 DIAGNOSIS — Z00.00 ANNUAL PHYSICAL EXAM: Primary | ICD-10-CM

## 2021-10-22 DIAGNOSIS — E03.9 HYPOTHYROIDISM, UNSPECIFIED TYPE: ICD-10-CM

## 2021-10-22 DIAGNOSIS — E11.9 TYPE 2 DIABETES MELLITUS WITHOUT COMPLICATION, WITHOUT LONG-TERM CURRENT USE OF INSULIN (HCC): ICD-10-CM

## 2021-10-22 DIAGNOSIS — G56.02 CARPAL TUNNEL SYNDROME, LEFT: ICD-10-CM

## 2021-10-22 DIAGNOSIS — I10 HYPERTENSION, UNSPECIFIED TYPE: ICD-10-CM

## 2021-10-22 PROCEDURE — 3008F BODY MASS INDEX DOCD: CPT | Performed by: FAMILY MEDICINE

## 2021-10-22 PROCEDURE — 90732 PPSV23 VACC 2 YRS+ SUBQ/IM: CPT | Performed by: FAMILY MEDICINE

## 2021-10-22 PROCEDURE — 99397 PER PM REEVAL EST PAT 65+ YR: CPT | Performed by: FAMILY MEDICINE

## 2021-10-22 PROCEDURE — 90472 IMMUNIZATION ADMIN EACH ADD: CPT | Performed by: FAMILY MEDICINE

## 2021-10-22 PROCEDURE — 3078F DIAST BP <80 MM HG: CPT | Performed by: FAMILY MEDICINE

## 2021-10-22 PROCEDURE — 3075F SYST BP GE 130 - 139MM HG: CPT | Performed by: FAMILY MEDICINE

## 2021-10-22 PROCEDURE — 90662 IIV NO PRSV INCREASED AG IM: CPT | Performed by: FAMILY MEDICINE

## 2021-10-22 PROCEDURE — 90471 IMMUNIZATION ADMIN: CPT | Performed by: FAMILY MEDICINE

## 2021-10-24 NOTE — PROGRESS NOTES
Subjective:   Patient ID: Brennen Aguilar is a 68year old female. HPI Here for annual check-up. Patient is having carpal tunnel surgery and also thought she was supposed to have pre-op appt. Taking all meds as prescribed with no issues.      History/O Laterality Date   • COLONOSCOPY  2010   • D & C  1975   • EPIDUROGRAPHY, RADIOLOGICAL S & I  3/22/2013    Procedure: CAUDAL;  Surgeon: Licha Rodarte MD;  Location: [address] [place] [address] [facility]CATH SPI DX/THER [address]  3/1/2013 [place] FOR PAIN MANAGEMENT   • INJECT NERV VENKATCK,OTHR PERIPH NERV Right 7/14/2014    Procedure: LUMBAR FACET INJECTION OR MEDIAL BRANCH NERVE BLOCK;  Surgeon: Lyndsey Hendrickson MD;  Location: [facility] FOR PAIN MANAGEMENT   • INJECT NERV [address] JOINT INJECTION;  Surgeon: Denise Cool MD;  Location: [facility] FOR PAIN MANAGEMENT   • M-SEDAJ BY  PHYS [id number] .[facility]  [facility] 5+ YR N/A 6/9/2014    Procedure: SI JOINT INJECTION;  Surgeon: Denise Cool MD;  Location: [address] [place] [address]   • M-S arthralgias and joint swelling. Skin: Negative for rash. Neurological: Negative for dizziness, weakness, numbness and headaches. Hematological: Negative for adenopathy. Does not bruise/bleed easily.    Psychiatric/Behavioral: Negative for dysphoric mo • Multiple Vitamin (MULTI-VITAMIN) Oral Tab Take 1 tablet by mouth daily. • acetaminophen (TYLENOL EXTRA STRENGTH) 500 MG Oral Tab Take 2 tablets by mouth every 6 (six) hours as needed for 10 days.  100 tablet 0   • psyllium (METAMUCIL SMOOTH TEXTUR age-appropriate preventive health and safety recommendations with patient. Reviewed lab results. Encouraged regular exercise and healthy eating. Pneumovax and flu.   2. Confirmed with surgeon's office, no need for pre-op appt.    3. A1c at goal. Encouraged

## 2021-10-25 ENCOUNTER — TELEPHONE (OUTPATIENT)
Dept: INTERNAL MEDICINE CLINIC | Facility: CLINIC | Age: 76
End: 2021-10-25

## 2021-10-25 RX ORDER — PREGABALIN 100 MG/1
100 CAPSULE ORAL 2 TIMES DAILY
Qty: 180 CAPSULE | Refills: 1 | Status: SHIPPED | OUTPATIENT
Start: 2021-10-25 | End: 2022-01-23

## 2021-10-25 NOTE — TELEPHONE ENCOUNTER
Patient calling to see if Generic for Lyrica can be sent to pharmacy. Ins will not cover brand name. Call pt with questions.

## 2021-10-28 ENCOUNTER — TELEPHONE (OUTPATIENT)
Dept: INTERNAL MEDICINE CLINIC | Facility: CLINIC | Age: 76
End: 2021-10-28

## 2021-10-28 NOTE — TELEPHONE ENCOUNTER
Received evaluation notes from [facility] for visit on 10/26/2021. Placed in AMS bin to review.

## 2021-10-29 RX ORDER — LOSARTAN POTASSIUM 50 MG/1
50 TABLET ORAL DAILY
Qty: 90 TABLET | Refills: 1 | Status: SHIPPED | OUTPATIENT
Start: 2021-10-29

## 2021-10-29 RX ORDER — LIDOCAINE 50 MG/G
3 PATCH TOPICAL EVERY 24 HOURS
Qty: 270 PATCH | Refills: 1 | Status: SHIPPED | OUTPATIENT
Start: 2021-10-29

## 2021-10-29 NOTE — TELEPHONE ENCOUNTER
Last visit- 10/22/2021 cpe     Last refill- 09/08/2021 losartan potassium 50mg QTY90 0R,  06/11/2020 lidocaine 5% external patch [id number] 1R    Last labs- 10/15/2021 lipid, cmp, cbc, tsh, hemoglobin a1c, microalb   Future Appointments   Date Time Provider Rena Jimenez

## 2021-11-16 RX ORDER — TRIAMTERENE AND HYDROCHLOROTHIAZIDE 37.5; 25 MG/1; MG/1
1 TABLET ORAL DAILY
Qty: 90 TABLET | Refills: 1 | Status: SHIPPED | OUTPATIENT
Start: 2021-11-16

## 2021-11-16 RX ORDER — DILTIAZEM HYDROCHLORIDE 240 MG/1
CAPSULE, EXTENDED RELEASE ORAL
Qty: 90 CAPSULE | Refills: 1 | Status: SHIPPED | OUTPATIENT
Start: 2021-11-16

## 2021-11-16 NOTE — TELEPHONE ENCOUNTER
Last visit- 10/22/2021 cpe     Last refill- 08/24/2021 cartia xt 240mg QTY90 0R,  08/24/2021 triamterene-hydrochlorothiazide 37.5-25mg QTY90     Last labs- 10/15/2021 lipid, cmp, cbc, tsh, hemoglobin a1c, microalb    No future appointments.

## 2022-01-31 RX ORDER — LEVOTHYROXINE SODIUM 25 MCG
25 TABLET ORAL
Qty: 90 TABLET | Refills: 2 | Status: SHIPPED | OUTPATIENT
Start: 2022-01-31

## 2022-01-31 NOTE — TELEPHONE ENCOUNTER
Last visit- 10/22/2021 cpe     Last refill- 10/16/2021 synthroid 25mcg QTY90 0R    Last labs- 10/15/2021 lipid, cmp, cbc, tsh, hemoglobin a1c, microalb, tsh    No future appointments.

## 2022-04-06 ENCOUNTER — HOSPITAL ENCOUNTER (OUTPATIENT)
Age: 77
Discharge: HOME | End: 2022-04-06
Payer: COMMERCIAL

## 2022-04-06 VITALS
HEIGHT: 64 IN | DIASTOLIC BLOOD PRESSURE: 74 MMHG | WEIGHT: 195 LBS | HEART RATE: 73 BPM | OXYGEN SATURATION: 98 % | RESPIRATION RATE: 18 BRPM | SYSTOLIC BLOOD PRESSURE: 168 MMHG | TEMPERATURE: 97 F | BODY MASS INDEX: 33.29 KG/M2

## 2022-04-06 DIAGNOSIS — S60.459A FOREIGN BODY IN SKIN OF FINGER, INITIAL ENCOUNTER: Primary | ICD-10-CM

## 2022-04-06 PROCEDURE — 99213 OFFICE O/P EST LOW 20 MIN: CPT

## 2022-05-03 RX ORDER — PREGABALIN 100 MG/1
100 CAPSULE ORAL 2 TIMES DAILY
Qty: 180 CAPSULE | Refills: 0 | Status: SHIPPED | OUTPATIENT
Start: 2022-05-03 | End: 2022-08-01

## 2022-05-03 NOTE — TELEPHONE ENCOUNTER
Last visit- 10/22/2021 cpe     Last refill- 10/25/2021 pregabalin 100mg [id number] 1R    Last labs- 10/15/2021 lipid, cmp, cbc, tsh, hemoglobin a1c, microalb     No future appointments.

## 2022-06-20 RX ORDER — DILTIAZEM HYDROCHLORIDE 240 MG/1
CAPSULE, COATED, EXTENDED RELEASE ORAL
Qty: 90 CAPSULE | Refills: 0 | Status: SHIPPED | OUTPATIENT
Start: 2022-06-20

## 2022-06-20 NOTE — TELEPHONE ENCOUNTER
Last visit- 10/22/2021 cpe     Last refill- 03/19/2021 diltiazem hcl er 240mg QTY90 0R    Last labs- 10/15/2021 lipid, cmp, cbc, tsh, hemoglobin a1c, microalb    No future appointments.

## 2022-06-20 NOTE — TELEPHONE ENCOUNTER
Patient is scheduled  Future Appointments   Date Time Provider Courtney Desai   7/1/2022  9:30 AM Ladonna Krishnamurthy,  [facility]

## 2022-06-22 RX ORDER — DILTIAZEM HYDROCHLORIDE 240 MG/1
CAPSULE, COATED, EXTENDED RELEASE ORAL
Qty: 90 CAPSULE | Refills: 3 | Status: SHIPPED | OUTPATIENT
Start: 2022-06-22

## 2022-06-22 NOTE — TELEPHONE ENCOUNTER
Last visit- 10/22/2021 cpe     Last refill- 06/20/2022 diltiazem er 240mg QTY90 0R    Last labs- 10/15/2021 lipid, cmp, cbc, tsh, hemoglobin a1c, microalb   Future Appointments   Date Time Provider Courtney Desai   7/1/2022  9:30 AM Mary Lou Solorzano, DO [facility]       Pt requesting 1yr supply.

## 2022-06-29 ENCOUNTER — TELEPHONE (OUTPATIENT)
Dept: INTERNAL MEDICINE CLINIC | Facility: CLINIC | Age: 77
End: 2022-06-29

## 2022-07-01 ENCOUNTER — OFFICE VISIT (OUTPATIENT)
Dept: INTERNAL MEDICINE CLINIC | Facility: CLINIC | Age: 77
End: 2022-07-01
Payer: COMMERCIAL

## 2022-07-01 ENCOUNTER — TELEPHONE (OUTPATIENT)
Dept: INTERNAL MEDICINE CLINIC | Facility: CLINIC | Age: 77
End: 2022-07-01

## 2022-07-01 VITALS
HEART RATE: 80 BPM | DIASTOLIC BLOOD PRESSURE: 70 MMHG | HEIGHT: 64 IN | WEIGHT: 190.81 LBS | SYSTOLIC BLOOD PRESSURE: 132 MMHG | BODY MASS INDEX: 32.58 KG/M2 | OXYGEN SATURATION: 96 %

## 2022-07-01 DIAGNOSIS — I10 HYPERTENSION, UNSPECIFIED TYPE: ICD-10-CM

## 2022-07-01 DIAGNOSIS — E11.9 TYPE 2 DIABETES MELLITUS WITHOUT COMPLICATION, WITHOUT LONG-TERM CURRENT USE OF INSULIN (HCC): Primary | ICD-10-CM

## 2022-07-01 LAB
CARTRIDGE LOT#: 970 NUMERIC
HEMOGLOBIN A1C: 7.4 % (ref 4.3–5.6)

## 2022-07-01 NOTE — TELEPHONE ENCOUNTER
Pt dropped off parking placard form for AMS to fill out and then mail in stamped envelop provided with form-placed form on [place] AMS desk

## 2022-07-07 RX ORDER — LOSARTAN POTASSIUM 50 MG/1
TABLET ORAL
Qty: 90 TABLET | Refills: 3 | Status: SHIPPED | OUTPATIENT
Start: 2022-07-07

## 2022-07-07 NOTE — TELEPHONE ENCOUNTER
Last visit- 07/01/2022 type 2 diabetes     Last refill- 10/29/2021 losartan 50mg QTY90 1R    Last labs- 10/15/2021 lipid, cmp, cbc, tsh, hemoglobin a1c, microalb     No future appointments.

## 2022-07-27 NOTE — TELEPHONE ENCOUNTER
I changed stress test order to pharmacologic stress test. Detail Level: Detailed Detail Level: Zone Detail Level: Generalized

## 2022-08-05 RX ORDER — METHOCARBAMOL 750 MG/1
750 TABLET, FILM COATED ORAL 3 TIMES DAILY
Qty: 270 TABLET | Refills: 0 | Status: SHIPPED | OUTPATIENT
Start: 2022-08-05

## 2022-08-05 RX ORDER — PREGABALIN 100 MG/1
100 CAPSULE ORAL 2 TIMES DAILY
Qty: 180 CAPSULE | Refills: 0 | Status: SHIPPED | OUTPATIENT
Start: 2022-08-05 | End: 2022-11-03

## 2022-10-13 ENCOUNTER — TELEPHONE (OUTPATIENT)
Dept: INTERNAL MEDICINE CLINIC | Facility: CLINIC | Age: 77
End: 2022-10-13

## 2022-10-13 RX ORDER — AMOXICILLIN 500 MG/1
2000 CAPSULE ORAL ONCE
Qty: 4 CAPSULE | Refills: 0 | Status: SHIPPED | OUTPATIENT
Start: 2022-10-13 | End: 2022-10-13

## 2022-10-13 NOTE — TELEPHONE ENCOUNTER
Patient notified of medication sent to the pharmacy. She reports her dentist would not do procedure without abx.

## 2022-10-13 NOTE — TELEPHONE ENCOUNTER
Please call patient. I sent Rx as requested but let patient know mitral valve prolapse is not an indication for abx prophylaxis. Studies have not shown it to be necessary.

## 2022-10-13 NOTE — TELEPHONE ENCOUNTER
Which [facility]    Prescription Refill Request - Patient advised can take 48-72 hours. Name of Medication (strength, dose, qty requested:     Amoxicillin 500mg. X 4(at one time)-- for a Dental Appointment today at 11:00am--because patient has mitral valve prolapse and she has a lot of metal in her body. Patient states that she has been going to this Dentist for years but they are acting like she is a new patient.

## 2022-10-24 DIAGNOSIS — Z13.220 SCREENING FOR LIPID DISORDERS: ICD-10-CM

## 2022-10-24 DIAGNOSIS — E03.9 HYPOTHYROIDISM, UNSPECIFIED TYPE: ICD-10-CM

## 2022-10-24 DIAGNOSIS — I10 HYPERTENSION, UNSPECIFIED TYPE: ICD-10-CM

## 2022-10-24 DIAGNOSIS — E11.9 TYPE 2 DIABETES MELLITUS WITHOUT COMPLICATION, WITHOUT LONG-TERM CURRENT USE OF INSULIN (HCC): Primary | ICD-10-CM

## 2022-10-24 DIAGNOSIS — I10 ESSENTIAL HYPERTENSION: ICD-10-CM

## 2022-10-24 DIAGNOSIS — Z13.0 SCREENING FOR DISORDER OF BLOOD AND BLOOD-FORMING ORGANS: ICD-10-CM

## 2022-10-24 DIAGNOSIS — Z13.228 SCREENING FOR METABOLIC DISORDER: ICD-10-CM

## 2022-10-24 DIAGNOSIS — Z00.00 ANNUAL PHYSICAL EXAM: ICD-10-CM

## 2022-10-25 RX ORDER — LIDOCAINE 50 MG/G
PATCH TOPICAL
Qty: 270 PATCH | Refills: 1 | Status: SHIPPED | OUTPATIENT
Start: 2022-10-25

## 2022-10-25 NOTE — TELEPHONE ENCOUNTER
Last visit- 07/01/2022 type 2 diabetes     Last refill- 10/29/2021 lidocaine 5% external patch [id number] 1R    Last labs- 10/15/2021 lipid, cmp, cbc, tsh, hemoglobin a1c, microalb     No future appointments.

## 2022-10-25 NOTE — TELEPHONE ENCOUNTER
Please call patient to schedule annual check-up. Fasting lab orders are at [facility].

## 2022-11-16 RX ORDER — PREGABALIN 100 MG/1
100 CAPSULE ORAL 2 TIMES DAILY
Qty: 180 CAPSULE | Refills: 1 | Status: SHIPPED | OUTPATIENT
Start: 2022-11-16 | End: 2023-02-14

## 2022-11-16 NOTE — TELEPHONE ENCOUNTER
Last OV 7.1.22 (DM f/up)   Last PE 10.22.21-PE scheduled 12.9.22  Last REFILL 8.5.22 Pregablin 100mg #180 0R  Last LABS 7.1.22 HgA1c    Future Appointments   Date Time Provider Courtney Desai   11/22/2022  9:30 AM TRISTA Chappell [facility] [place]   12/9/2022  2:45 PM Chuck Sullivan, DO [facility]         Per PROTOCOL?  Not on protocol     Please Advise

## 2022-11-22 ENCOUNTER — OFFICE VISIT (OUTPATIENT)
Dept: PAIN CLINIC | Facility: CLINIC | Age: 77
End: 2022-11-22
Payer: COMMERCIAL

## 2022-11-22 VITALS — SYSTOLIC BLOOD PRESSURE: 130 MMHG | HEART RATE: 86 BPM | OXYGEN SATURATION: 98 % | DIASTOLIC BLOOD PRESSURE: 80 MMHG

## 2022-11-22 DIAGNOSIS — M54.16 LUMBAR RADICULOPATHY: Primary | ICD-10-CM

## 2022-11-22 DIAGNOSIS — Z98.1 S/P LAMINECTOMY WITH SPINAL FUSION: ICD-10-CM

## 2022-11-22 PROCEDURE — 3075F SYST BP GE 130 - 139MM HG: CPT | Performed by: NURSE PRACTITIONER

## 2022-11-22 PROCEDURE — 99214 OFFICE O/P EST MOD 30 MIN: CPT | Performed by: NURSE PRACTITIONER

## 2022-11-22 PROCEDURE — 3079F DIAST BP 80-89 MM HG: CPT | Performed by: NURSE PRACTITIONER

## 2022-11-22 RX ORDER — AMITRIPTYLINE HYDROCHLORIDE 10 MG/1
10 TABLET, FILM COATED ORAL NIGHTLY
Qty: 30 TABLET | Refills: 0 | Status: SHIPPED | OUTPATIENT
Start: 2022-11-22

## 2022-11-22 NOTE — PROGRESS NOTES
Patient presents in office today with reported pain in low back    Current pain level reported = 10/10    Last reported dose of nothing       Narcotic Contract renewal na    Urine Drug screen na

## 2022-12-01 RX ORDER — LEVOTHYROXINE SODIUM 25 MCG
25 TABLET ORAL
Qty: 90 TABLET | Refills: 0 | Status: SHIPPED | OUTPATIENT
Start: 2022-12-01

## 2022-12-01 NOTE — TELEPHONE ENCOUNTER
Last visit- 07/01/2022 type 2 diabetes     Last refill- 01/31/2022 synthroid 25mcg QTY90 2R    Last labs- 10/15/2021 tsh   Future Appointments   Date Time Provider Courtney Desai   12/9/2022  2:45 PM Thad Barron DO [facility]   12/16/2022  6:00 PM E[facility] XR RM5 (BONE) [id number] [facility]   12/16/2022  6:45 PM [facility] MR RM4 (3T WIDE) [facility] MRI [facility]

## 2022-12-09 ENCOUNTER — OFFICE VISIT (OUTPATIENT)
Dept: INTERNAL MEDICINE CLINIC | Facility: CLINIC | Age: 77
End: 2022-12-09
Payer: COMMERCIAL

## 2022-12-09 VITALS
RESPIRATION RATE: 18 BRPM | WEIGHT: 189 LBS | HEIGHT: 64 IN | SYSTOLIC BLOOD PRESSURE: 128 MMHG | BODY MASS INDEX: 32.27 KG/M2 | DIASTOLIC BLOOD PRESSURE: 70 MMHG | HEART RATE: 80 BPM | OXYGEN SATURATION: 98 %

## 2022-12-09 DIAGNOSIS — I10 HYPERTENSION, UNSPECIFIED TYPE: ICD-10-CM

## 2022-12-09 DIAGNOSIS — E03.9 HYPOTHYROIDISM, UNSPECIFIED TYPE: ICD-10-CM

## 2022-12-09 DIAGNOSIS — E11.9 TYPE 2 DIABETES MELLITUS WITHOUT COMPLICATION, WITHOUT LONG-TERM CURRENT USE OF INSULIN (HCC): ICD-10-CM

## 2022-12-09 DIAGNOSIS — Z00.00 ANNUAL PHYSICAL EXAM: Primary | ICD-10-CM

## 2022-12-09 PROCEDURE — 3008F BODY MASS INDEX DOCD: CPT | Performed by: FAMILY MEDICINE

## 2022-12-09 PROCEDURE — 99397 PER PM REEVAL EST PAT 65+ YR: CPT | Performed by: FAMILY MEDICINE

## 2022-12-09 PROCEDURE — 90471 IMMUNIZATION ADMIN: CPT | Performed by: FAMILY MEDICINE

## 2022-12-09 PROCEDURE — 3074F SYST BP LT 130 MM HG: CPT | Performed by: FAMILY MEDICINE

## 2022-12-09 PROCEDURE — 3078F DIAST BP <80 MM HG: CPT | Performed by: FAMILY MEDICINE

## 2022-12-09 PROCEDURE — 90662 IIV NO PRSV INCREASED AG IM: CPT | Performed by: FAMILY MEDICINE

## 2022-12-16 ENCOUNTER — APPOINTMENT (OUTPATIENT)
Dept: GENERAL RADIOLOGY | Facility: HOSPITAL | Age: 77
End: 2022-12-16
Attending: NURSE PRACTITIONER
Payer: COMMERCIAL

## 2022-12-16 ENCOUNTER — HOSPITAL ENCOUNTER (OUTPATIENT)
Dept: MRI IMAGING | Facility: HOSPITAL | Age: 77
Discharge: HOME | End: 2022-12-16
Attending: NURSE PRACTITIONER
Payer: COMMERCIAL

## 2022-12-16 ENCOUNTER — HOSPITAL ENCOUNTER (OUTPATIENT)
Dept: GENERAL RADIOLOGY | Facility: HOSPITAL | Age: 77
Discharge: HOME | End: 2022-12-16
Attending: NURSE PRACTITIONER
Payer: COMMERCIAL

## 2022-12-16 DIAGNOSIS — M54.16 LUMBAR RADICULOPATHY: ICD-10-CM

## 2022-12-16 DIAGNOSIS — Z98.1 S/P LAMINECTOMY WITH SPINAL FUSION: ICD-10-CM

## 2022-12-16 PROCEDURE — 72158 MRI LUMBAR SPINE W/O & W/DYE: CPT | Performed by: NURSE PRACTITIONER

## 2022-12-16 PROCEDURE — A9575 INJ GADOTERATE MEGLUMI 0.1ML: HCPCS | Performed by: NURSE PRACTITIONER

## 2022-12-16 PROCEDURE — 72114 X-RAY EXAM L-S SPINE BENDING: CPT | Performed by: NURSE PRACTITIONER

## 2022-12-16 RX ORDER — GADOTERATE MEGLUMINE 376.9 MG/ML
20 INJECTION INTRAVENOUS
Status: COMPLETED | OUTPATIENT
Start: 2022-12-16 | End: 2022-12-16

## 2022-12-16 RX ADMIN — GADOTERATE MEGLUMINE 17 ML: 376.9 INJECTION INTRAVENOUS at 19:28:00

## 2022-12-30 ENCOUNTER — LAB ENCOUNTER (OUTPATIENT)
Dept: LAB | Age: 77
End: 2022-12-30
Attending: FAMILY MEDICINE
Payer: COMMERCIAL

## 2022-12-30 DIAGNOSIS — Z13.0 SCREENING FOR DISORDER OF BLOOD AND BLOOD-FORMING ORGANS: ICD-10-CM

## 2022-12-30 DIAGNOSIS — E03.9 HYPOTHYROIDISM, UNSPECIFIED TYPE: ICD-10-CM

## 2022-12-30 DIAGNOSIS — I10 ESSENTIAL HYPERTENSION: ICD-10-CM

## 2022-12-30 DIAGNOSIS — Z00.00 ANNUAL PHYSICAL EXAM: ICD-10-CM

## 2022-12-30 DIAGNOSIS — E11.9 TYPE 2 DIABETES MELLITUS WITHOUT COMPLICATION, WITHOUT LONG-TERM CURRENT USE OF INSULIN (HCC): ICD-10-CM

## 2022-12-30 DIAGNOSIS — Z13.220 SCREENING FOR LIPID DISORDERS: ICD-10-CM

## 2022-12-30 DIAGNOSIS — Z13.228 SCREENING FOR METABOLIC DISORDER: ICD-10-CM

## 2022-12-30 DIAGNOSIS — I10 HYPERTENSION, UNSPECIFIED TYPE: ICD-10-CM

## 2022-12-30 LAB
ALBUMIN SERPL-MCNC: 3.6 G/DL (ref 3.4–5)
ALBUMIN/GLOB SERPL: 1 {RATIO} (ref 1–2)
ALP LIVER SERPL-CCNC: 71 U/L
ALT SERPL-CCNC: 23 U/L
ANION GAP SERPL CALC-SCNC: 4 MMOL/L (ref 0–18)
AST SERPL-CCNC: 26 U/L (ref 15–37)
BASOPHILS # BLD AUTO: 0.02 X10(3) UL (ref 0–0.2)
BASOPHILS NFR BLD AUTO: 0.2 %
BILIRUB SERPL-MCNC: 0.6 MG/DL (ref 0.1–2)
BUN BLD-MCNC: 23 MG/DL (ref 7–18)
CALCIUM BLD-MCNC: 9.7 MG/DL (ref 8.5–10.1)
CHLORIDE SERPL-SCNC: 109 MMOL/L (ref 98–112)
CHOLEST SERPL-MCNC: 165 MG/DL (ref ?–200)
CO2 SERPL-SCNC: 27 MMOL/L (ref 21–32)
CREAT BLD-MCNC: 0.99 MG/DL
CREAT UR-SCNC: 298 MG/DL
EOSINOPHIL # BLD AUTO: 0.09 X10(3) UL (ref 0–0.7)
EOSINOPHIL NFR BLD AUTO: 1.1 %
ERYTHROCYTE [DISTWIDTH] IN BLOOD BY AUTOMATED COUNT: 12.4 %
EST. AVERAGE GLUCOSE BLD GHB EST-MCNC: 157 MG/DL (ref 68–126)
FASTING PATIENT LIPID ANSWER: YES
FASTING STATUS PATIENT QL REPORTED: YES
GFR SERPLBLD BASED ON 1.73 SQ M-ARVRAT: 59 ML/MIN/1.73M2 (ref 60–?)
GLOBULIN PLAS-MCNC: 3.7 G/DL (ref 2.8–4.4)
GLUCOSE BLD-MCNC: 164 MG/DL (ref 70–99)
HBA1C MFR BLD: 7.1 % (ref ?–5.7)
HCT VFR BLD AUTO: 40.2 %
HDLC SERPL-MCNC: 41 MG/DL (ref 40–59)
HGB BLD-MCNC: 13.3 G/DL
IMM GRANULOCYTES # BLD AUTO: 0.02 X10(3) UL (ref 0–1)
IMM GRANULOCYTES NFR BLD: 0.2 %
LDLC SERPL CALC-MCNC: 84 MG/DL (ref ?–100)
LYMPHOCYTES # BLD AUTO: 2.91 X10(3) UL (ref 1–4)
LYMPHOCYTES NFR BLD AUTO: 35.6 %
MCH RBC QN AUTO: 30.1 PG (ref 26–34)
MCHC RBC AUTO-ENTMCNC: 33.1 G/DL (ref 31–37)
MCV RBC AUTO: 91 FL
MICROALBUMIN UR-MCNC: 3.11 MG/DL
MICROALBUMIN/CREAT 24H UR-RTO: 10.4 UG/MG (ref ?–30)
MONOCYTES # BLD AUTO: 0.64 X10(3) UL (ref 0.1–1)
MONOCYTES NFR BLD AUTO: 7.8 %
NEUTROPHILS # BLD AUTO: 4.49 X10 (3) UL (ref 1.5–7.7)
NEUTROPHILS # BLD AUTO: 4.49 X10(3) UL (ref 1.5–7.7)
NEUTROPHILS NFR BLD AUTO: 55.1 %
NONHDLC SERPL-MCNC: 124 MG/DL (ref ?–130)
OSMOLALITY SERPL CALC.SUM OF ELEC: 297 MOSM/KG (ref 275–295)
PLATELET # BLD AUTO: 214 10(3)UL (ref 150–450)
POTASSIUM SERPL-SCNC: 4.1 MMOL/L (ref 3.5–5.1)
PROT SERPL-MCNC: 7.3 G/DL (ref 6.4–8.2)
RBC # BLD AUTO: 4.42 X10(6)UL
SODIUM SERPL-SCNC: 140 MMOL/L (ref 136–145)
T3FREE SERPL-MCNC: 2.92 PG/ML (ref 2.4–4.2)
T4 FREE SERPL-MCNC: 1.3 NG/DL (ref 0.8–1.7)
TRIGL SERPL-MCNC: 237 MG/DL (ref 30–149)
TSI SER-ACNC: 0.2 MIU/ML (ref 0.36–3.74)
VLDLC SERPL CALC-MCNC: 38 MG/DL (ref 0–30)
WBC # BLD AUTO: 8.2 X10(3) UL (ref 4–11)

## 2022-12-30 PROCEDURE — 84443 ASSAY THYROID STIM HORMONE: CPT

## 2022-12-30 PROCEDURE — 83036 HEMOGLOBIN GLYCOSYLATED A1C: CPT

## 2022-12-30 PROCEDURE — 82570 ASSAY OF URINE CREATININE: CPT

## 2022-12-30 PROCEDURE — 84439 ASSAY OF FREE THYROXINE: CPT

## 2022-12-30 PROCEDURE — 84481 FREE ASSAY (FT-3): CPT

## 2022-12-30 PROCEDURE — 85025 COMPLETE CBC W/AUTO DIFF WBC: CPT

## 2022-12-30 PROCEDURE — 80061 LIPID PANEL: CPT

## 2022-12-30 PROCEDURE — 80053 COMPREHEN METABOLIC PANEL: CPT

## 2022-12-30 PROCEDURE — 82043 UR ALBUMIN QUANTITATIVE: CPT

## 2022-12-30 PROCEDURE — 36415 COLL VENOUS BLD VENIPUNCTURE: CPT

## 2023-01-03 DIAGNOSIS — E03.9 HYPOTHYROIDISM, UNSPECIFIED TYPE: Primary | ICD-10-CM

## 2023-02-28 NOTE — TELEPHONE ENCOUNTER
Last visit- 12/09/2022 cpe     Last refill- 12/01/2022 synthroid 25mcg QTY90 0R    Last labs- 12/30/2022 free t3, t4 free, tsh     No future appointments.

## 2023-03-01 RX ORDER — LEVOTHYROXINE SODIUM 25 MCG
25 TABLET ORAL
Qty: 90 TABLET | Refills: 2 | Status: SHIPPED | OUTPATIENT
Start: 2023-03-01

## 2023-04-04 ENCOUNTER — TELEPHONE (OUTPATIENT)
Dept: INTERNAL MEDICINE CLINIC | Facility: CLINIC | Age: 78
End: 2023-04-04

## 2023-04-04 NOTE — TELEPHONE ENCOUNTER
Pt received certified letter and calling back on lab results. Pt stated shes got a 30min left from her lunch and will be available. After than, she's back to working from home and can't really answer the call. I advised pt to call us back if she gets another message from us.

## 2023-04-06 ENCOUNTER — HOSPITAL ENCOUNTER (EMERGENCY)
Facility: HOSPITAL | Age: 78
Discharge: HOME | End: 2023-04-06
Attending: EMERGENCY MEDICINE
Payer: COMMERCIAL

## 2023-04-06 VITALS
RESPIRATION RATE: 20 BRPM | OXYGEN SATURATION: 93 % | BODY MASS INDEX: 35.87 KG/M2 | HEIGHT: 61 IN | HEART RATE: 84 BPM | TEMPERATURE: 98 F | DIASTOLIC BLOOD PRESSURE: 74 MMHG | WEIGHT: 190 LBS | SYSTOLIC BLOOD PRESSURE: 152 MMHG

## 2023-04-06 DIAGNOSIS — E87.6 HYPOKALEMIA: ICD-10-CM

## 2023-04-06 DIAGNOSIS — E83.52 HYPERCALCEMIA: Primary | ICD-10-CM

## 2023-04-06 DIAGNOSIS — E86.0 DEHYDRATION: ICD-10-CM

## 2023-04-06 LAB
ALBUMIN SERPL-MCNC: 3.7 G/DL (ref 3.4–5)
ALBUMIN/GLOB SERPL: 0.8 {RATIO} (ref 1–2)
ALP LIVER SERPL-CCNC: 88 U/L
ALT SERPL-CCNC: 30 U/L
ANION GAP SERPL CALC-SCNC: 10 MMOL/L (ref 0–18)
AST SERPL-CCNC: 30 U/L (ref 15–37)
BASOPHILS # BLD AUTO: 0.02 X10(3) UL (ref 0–0.2)
BASOPHILS NFR BLD AUTO: 0.2 %
BILIRUB SERPL-MCNC: 0.8 MG/DL (ref 0.1–2)
BILIRUB UR QL STRIP.AUTO: NEGATIVE
BUN BLD-MCNC: 12 MG/DL (ref 7–18)
CALCIUM BLD-MCNC: 11.9 MG/DL (ref 8.5–10.1)
CHLORIDE SERPL-SCNC: 106 MMOL/L (ref 98–112)
CLARITY UR REFRACT.AUTO: CLEAR
CO2 SERPL-SCNC: 24 MMOL/L (ref 21–32)
COLOR UR AUTO: YELLOW
CREAT BLD-MCNC: 1.07 MG/DL
EOSINOPHIL # BLD AUTO: 0.01 X10(3) UL (ref 0–0.7)
EOSINOPHIL NFR BLD AUTO: 0.1 %
ERYTHROCYTE [DISTWIDTH] IN BLOOD BY AUTOMATED COUNT: 11.7 %
GFR SERPLBLD BASED ON 1.73 SQ M-ARVRAT: 53 ML/MIN/1.73M2 (ref 60–?)
GLOBULIN PLAS-MCNC: 4.5 G/DL (ref 2.8–4.4)
GLUCOSE BLD-MCNC: 207 MG/DL (ref 70–99)
GLUCOSE UR STRIP.AUTO-MCNC: NEGATIVE MG/DL
HCT VFR BLD AUTO: 43.1 %
HGB BLD-MCNC: 15.1 G/DL
IMM GRANULOCYTES # BLD AUTO: 0.02 X10(3) UL (ref 0–1)
IMM GRANULOCYTES NFR BLD: 0.2 %
LEUKOCYTE ESTERASE UR QL STRIP.AUTO: NEGATIVE
LYMPHOCYTES # BLD AUTO: 2.49 X10(3) UL (ref 1–4)
LYMPHOCYTES NFR BLD AUTO: 29 %
MCH RBC QN AUTO: 30.2 PG (ref 26–34)
MCHC RBC AUTO-ENTMCNC: 35 G/DL (ref 31–37)
MCV RBC AUTO: 86.2 FL
MONOCYTES # BLD AUTO: 0.69 X10(3) UL (ref 0.1–1)
MONOCYTES NFR BLD AUTO: 8 %
NEUTROPHILS # BLD AUTO: 5.37 X10 (3) UL (ref 1.5–7.7)
NEUTROPHILS # BLD AUTO: 5.37 X10(3) UL (ref 1.5–7.7)
NEUTROPHILS NFR BLD AUTO: 62.5 %
NITRITE UR QL STRIP.AUTO: NEGATIVE
OSMOLALITY SERPL CALC.SUM OF ELEC: 296 MOSM/KG (ref 275–295)
PH UR STRIP.AUTO: 7 [PH] (ref 5–8)
PLATELET # BLD AUTO: 227 10(3)UL (ref 150–450)
POTASSIUM SERPL-SCNC: 3.1 MMOL/L (ref 3.5–5.1)
PROT SERPL-MCNC: 8.2 G/DL (ref 6.4–8.2)
PROT UR STRIP.AUTO-MCNC: 100 MG/DL
RBC # BLD AUTO: 5 X10(6)UL
SODIUM SERPL-SCNC: 140 MMOL/L (ref 136–145)
SP GR UR STRIP.AUTO: 1.01 (ref 1–1.03)
UROBILINOGEN UR STRIP.AUTO-MCNC: <2 MG/DL
WBC # BLD AUTO: 8.6 X10(3) UL (ref 4–11)

## 2023-04-06 PROCEDURE — 85025 COMPLETE CBC W/AUTO DIFF WBC: CPT | Performed by: EMERGENCY MEDICINE

## 2023-04-06 PROCEDURE — 81001 URINALYSIS AUTO W/SCOPE: CPT | Performed by: EMERGENCY MEDICINE

## 2023-04-06 PROCEDURE — 99283 EMERGENCY DEPT VISIT LOW MDM: CPT

## 2023-04-06 PROCEDURE — 36415 COLL VENOUS BLD VENIPUNCTURE: CPT

## 2023-04-06 PROCEDURE — 99284 EMERGENCY DEPT VISIT MOD MDM: CPT

## 2023-04-06 PROCEDURE — 80053 COMPREHEN METABOLIC PANEL: CPT | Performed by: EMERGENCY MEDICINE

## 2023-04-06 RX ORDER — ONDANSETRON 2 MG/ML
4 INJECTION INTRAMUSCULAR; INTRAVENOUS ONCE
Status: DISCONTINUED | OUTPATIENT
Start: 2023-04-06 | End: 2023-04-06

## 2023-04-06 RX ORDER — AZITHROMYCIN 500 MG/1
500 TABLET, FILM COATED ORAL DAILY
Qty: 3 TABLET | Refills: 0 | Status: SHIPPED | OUTPATIENT
Start: 2023-04-06 | End: 2023-04-11

## 2023-04-06 RX ORDER — POTASSIUM CHLORIDE 20 MEQ/1
40 TABLET, EXTENDED RELEASE ORAL ONCE
Status: COMPLETED | OUTPATIENT
Start: 2023-04-06 | End: 2023-04-06

## 2023-04-06 NOTE — ED INITIAL ASSESSMENT (HPI)
Patient reports she was sick last Friday for 10 hours. Now complaints of severe weakness, leg and foot pain, overall not feeling well.   Hx of the same when she was dehydrated

## 2023-04-08 ENCOUNTER — APPOINTMENT (OUTPATIENT)
Dept: GENERAL RADIOLOGY | Facility: HOSPITAL | Age: 78
End: 2023-04-08
Attending: EMERGENCY MEDICINE
Payer: COMMERCIAL

## 2023-04-08 ENCOUNTER — HOSPITAL ENCOUNTER (INPATIENT)
Facility: HOSPITAL | Age: 78
LOS: 3 days | Discharge: HOME | End: 2023-04-11
Attending: EMERGENCY MEDICINE | Admitting: HOSPITALIST
Payer: COMMERCIAL

## 2023-04-08 DIAGNOSIS — E86.0 DEHYDRATION: ICD-10-CM

## 2023-04-08 DIAGNOSIS — R53.1 WEAKNESS GENERALIZED: ICD-10-CM

## 2023-04-08 DIAGNOSIS — U07.1 COVID-19 VIRUS INFECTION: ICD-10-CM

## 2023-04-08 DIAGNOSIS — E87.6 HYPOKALEMIA: Primary | ICD-10-CM

## 2023-04-08 DIAGNOSIS — E83.42 HYPOMAGNESEMIA: ICD-10-CM

## 2023-04-08 LAB
ALBUMIN SERPL-MCNC: 3.5 G/DL (ref 3.4–5)
ALBUMIN/GLOB SERPL: 0.8 {RATIO} (ref 1–2)
ALP LIVER SERPL-CCNC: 85 U/L
ALT SERPL-CCNC: 25 U/L
ANION GAP SERPL CALC-SCNC: 10 MMOL/L (ref 0–18)
AST SERPL-CCNC: 23 U/L (ref 15–37)
ATRIAL RATE: 74 BPM
BASOPHILS # BLD AUTO: 0.01 X10(3) UL (ref 0–0.2)
BASOPHILS NFR BLD AUTO: 0.1 %
BILIRUB SERPL-MCNC: 0.9 MG/DL (ref 0.1–2)
BILIRUB UR QL STRIP.AUTO: NEGATIVE
BUN BLD-MCNC: 9 MG/DL (ref 7–18)
CALCIUM BLD-MCNC: 9.5 MG/DL (ref 8.5–10.1)
CHLORIDE SERPL-SCNC: 106 MMOL/L (ref 98–112)
CLARITY UR REFRACT.AUTO: CLEAR
CO2 SERPL-SCNC: 21 MMOL/L (ref 21–32)
COLOR UR AUTO: YELLOW
CREAT BLD-MCNC: 0.87 MG/DL
EOSINOPHIL # BLD AUTO: 0.02 X10(3) UL (ref 0–0.7)
EOSINOPHIL NFR BLD AUTO: 0.3 %
ERYTHROCYTE [DISTWIDTH] IN BLOOD BY AUTOMATED COUNT: 11.6 %
EST. AVERAGE GLUCOSE BLD GHB EST-MCNC: 169 MG/DL (ref 68–126)
GFR SERPLBLD BASED ON 1.73 SQ M-ARVRAT: 69 ML/MIN/1.73M2 (ref 60–?)
GLOBULIN PLAS-MCNC: 4.4 G/DL (ref 2.8–4.4)
GLUCOSE BLD-MCNC: 115 MG/DL (ref 70–99)
GLUCOSE BLD-MCNC: 135 MG/DL (ref 70–99)
GLUCOSE BLD-MCNC: 142 MG/DL (ref 70–99)
GLUCOSE BLD-MCNC: 177 MG/DL (ref 70–99)
GLUCOSE UR STRIP.AUTO-MCNC: NEGATIVE MG/DL
HBA1C MFR BLD: 7.5 % (ref ?–5.7)
HCT VFR BLD AUTO: 39.7 %
HGB BLD-MCNC: 14.1 G/DL
IMM GRANULOCYTES # BLD AUTO: 0.03 X10(3) UL (ref 0–1)
IMM GRANULOCYTES NFR BLD: 0.4 %
KETONES UR STRIP.AUTO-MCNC: 20 MG/DL
LEUKOCYTE ESTERASE UR QL STRIP.AUTO: NEGATIVE
LYMPHOCYTES # BLD AUTO: 1.86 X10(3) UL (ref 1–4)
LYMPHOCYTES NFR BLD AUTO: 23.7 %
MAGNESIUM SERPL-MCNC: 1.1 MG/DL (ref 1.6–2.6)
MCH RBC QN AUTO: 29.7 PG (ref 26–34)
MCHC RBC AUTO-ENTMCNC: 35.5 G/DL (ref 31–37)
MCV RBC AUTO: 83.8 FL
MONOCYTES # BLD AUTO: 0.56 X10(3) UL (ref 0.1–1)
MONOCYTES NFR BLD AUTO: 7.1 %
NEUTROPHILS # BLD AUTO: 5.37 X10 (3) UL (ref 1.5–7.7)
NEUTROPHILS # BLD AUTO: 5.37 X10(3) UL (ref 1.5–7.7)
NEUTROPHILS NFR BLD AUTO: 68.4 %
NITRITE UR QL STRIP.AUTO: NEGATIVE
OSMOLALITY SERPL CALC.SUM OF ELEC: 287 MOSM/KG (ref 275–295)
P AXIS: -11 DEGREES
P-R INTERVAL: 118 MS
PH UR STRIP.AUTO: 7 [PH] (ref 5–8)
PLATELET # BLD AUTO: 217 10(3)UL (ref 150–450)
POTASSIUM SERPL-SCNC: 3 MMOL/L (ref 3.5–5.1)
PROT SERPL-MCNC: 7.9 G/DL (ref 6.4–8.2)
PROT UR STRIP.AUTO-MCNC: NEGATIVE MG/DL
Q-T INTERVAL: 410 MS
QRS DURATION: 124 MS
QTC CALCULATION (BEZET): 455 MS
R AXIS: -54 DEGREES
RBC # BLD AUTO: 4.74 X10(6)UL
RBC UR QL AUTO: NEGATIVE
SARS-COV-2 RNA RESP QL NAA+PROBE: DETECTED
SODIUM SERPL-SCNC: 137 MMOL/L (ref 136–145)
SP GR UR STRIP.AUTO: 1 (ref 1–1.03)
T AXIS: 48 DEGREES
T4 FREE SERPL-MCNC: 1.6 NG/DL (ref 0.8–1.7)
TROPONIN I HIGH SENSITIVITY: 20 NG/L
TSI SER-ACNC: 0.59 MIU/ML (ref 0.36–3.74)
UROBILINOGEN UR STRIP.AUTO-MCNC: <2 MG/DL
VENTRICULAR RATE: 74 BPM
WBC # BLD AUTO: 7.9 X10(3) UL (ref 4–11)

## 2023-04-08 PROCEDURE — 99223 1ST HOSP IP/OBS HIGH 75: CPT | Performed by: HOSPITALIST

## 2023-04-08 PROCEDURE — 71045 X-RAY EXAM CHEST 1 VIEW: CPT | Performed by: EMERGENCY MEDICINE

## 2023-04-08 RX ORDER — MAGNESIUM SULFATE HEPTAHYDRATE 40 MG/ML
2 INJECTION, SOLUTION INTRAVENOUS ONCE
Status: COMPLETED | OUTPATIENT
Start: 2023-04-08 | End: 2023-04-08

## 2023-04-08 RX ORDER — DILTIAZEM HYDROCHLORIDE 120 MG/1
240 CAPSULE, EXTENDED RELEASE ORAL DAILY
Status: DISCONTINUED | OUTPATIENT
Start: 2023-04-08 | End: 2023-04-11

## 2023-04-08 RX ORDER — LEVOTHYROXINE SODIUM 0.03 MG/1
25 TABLET ORAL
Status: DISCONTINUED | OUTPATIENT
Start: 2023-04-08 | End: 2023-04-11

## 2023-04-08 RX ORDER — ONDANSETRON 2 MG/ML
4 INJECTION INTRAMUSCULAR; INTRAVENOUS EVERY 6 HOURS PRN
Status: DISCONTINUED | OUTPATIENT
Start: 2023-04-08 | End: 2023-04-11

## 2023-04-08 RX ORDER — SODIUM CHLORIDE 9 MG/ML
INJECTION, SOLUTION INTRAVENOUS CONTINUOUS
Status: ACTIVE | OUTPATIENT
Start: 2023-04-08 | End: 2023-04-08

## 2023-04-08 RX ORDER — SODIUM CHLORIDE 9 MG/ML
INJECTION, SOLUTION INTRAVENOUS CONTINUOUS
Status: DISCONTINUED | OUTPATIENT
Start: 2023-04-08 | End: 2023-04-11

## 2023-04-08 RX ORDER — NICOTINE POLACRILEX 4 MG
15 LOZENGE BUCCAL
Status: DISCONTINUED | OUTPATIENT
Start: 2023-04-08 | End: 2023-04-11

## 2023-04-08 RX ORDER — VITAMIN E 268 MG
400 CAPSULE ORAL DAILY
Status: DISCONTINUED | OUTPATIENT
Start: 2023-04-08 | End: 2023-04-11

## 2023-04-08 RX ORDER — DEXTROSE MONOHYDRATE 25 G/50ML
50 INJECTION, SOLUTION INTRAVENOUS
Status: DISCONTINUED | OUTPATIENT
Start: 2023-04-08 | End: 2023-04-11

## 2023-04-08 RX ORDER — DIPHENOXYLATE HYDROCHLORIDE AND ATROPINE SULFATE 2.5; .025 MG/1; MG/1
1 TABLET ORAL 4 TIMES DAILY PRN
Status: DISCONTINUED | OUTPATIENT
Start: 2023-04-08 | End: 2023-04-11

## 2023-04-08 RX ORDER — SODIUM CHLORIDE 9 MG/ML
125 INJECTION, SOLUTION INTRAVENOUS CONTINUOUS
Status: DISCONTINUED | OUTPATIENT
Start: 2023-04-08 | End: 2023-04-09

## 2023-04-08 RX ORDER — POTASSIUM CHLORIDE 20 MEQ/1
40 TABLET, EXTENDED RELEASE ORAL ONCE
Status: COMPLETED | OUTPATIENT
Start: 2023-04-08 | End: 2023-04-08

## 2023-04-08 RX ORDER — MELATONIN
1000 DAILY
Status: DISCONTINUED | OUTPATIENT
Start: 2023-04-08 | End: 2023-04-11

## 2023-04-08 RX ORDER — ENOXAPARIN SODIUM 100 MG/ML
40 INJECTION SUBCUTANEOUS DAILY
Status: DISCONTINUED | OUTPATIENT
Start: 2023-04-08 | End: 2023-04-11

## 2023-04-08 RX ORDER — METOCLOPRAMIDE HYDROCHLORIDE 5 MG/ML
5 INJECTION INTRAMUSCULAR; INTRAVENOUS EVERY 8 HOURS PRN
Status: DISCONTINUED | OUTPATIENT
Start: 2023-04-08 | End: 2023-04-11

## 2023-04-08 RX ORDER — IBUPROFEN 200 MG
200 TABLET ORAL EVERY 4 HOURS PRN
Status: DISCONTINUED | OUTPATIENT
Start: 2023-04-08 | End: 2023-04-11

## 2023-04-08 RX ORDER — NICOTINE POLACRILEX 4 MG
30 LOZENGE BUCCAL
Status: DISCONTINUED | OUTPATIENT
Start: 2023-04-08 | End: 2023-04-11

## 2023-04-08 RX ORDER — ACETAMINOPHEN 325 MG/1
650 TABLET ORAL EVERY 6 HOURS PRN
Status: DISCONTINUED | OUTPATIENT
Start: 2023-04-08 | End: 2023-04-11

## 2023-04-08 RX ORDER — MELATONIN
3 NIGHTLY PRN
Status: DISCONTINUED | OUTPATIENT
Start: 2023-04-08 | End: 2023-04-11

## 2023-04-08 NOTE — ED QUICK NOTES
Ambulatory to the bathroom. Steady gait. States she feels chi on exertion.  Urine specimen obtained and sent to lab Chart reviewed. Last Colonoscopy  on 11/5/18 with Dr. Yañez. Recommend repeat  3 years.     Please call pt to schedule Colonoscopy  with Dr. Yañez.      Schedule Procedure:   Please Schedule Routine (next available or patient preference)  Procedure: Colonoscopy (12586) with MD preference for bowel prep.   Diagnosis: History of Colon Polyps Z86.010  Is patient:    Diabetic? No   ANTIPLATELET / ANTICOAGULATION: MEDICATION:  None  Latex allergy: No  Sleep apnea: No  Location: Patient Preference  Special Instructions:   MAC Anesthesia  Covid: Fully Vaccinated  No  Immunocompromised:  No  Covid order placed

## 2023-04-08 NOTE — PROGRESS NOTES
NURSING ADMISSION NOTE      Patient admitted via Ambulatory  Oriented to room. Safety precautions initiated. Bed in low position. Call light in reach. 4/8 AM: Pt is A/O x 4, in no apparent distress/pain. Lung sounds are clear, on room air. BP and HR are WNL, NSR on tele. Patient reports diarrhea the past week, nothing the past couple days, decreased appetite. Voids, up to bathroom with walker. Ordered lidocaine patch for chronic back pain. 0.9 at 125 mL/hr, potassium replaced IV.

## 2023-04-08 NOTE — ED INITIAL ASSESSMENT (HPI)
Pt came by herself, states she had diarrhea Friday 8 days ago, seen here 2 days ago and was sent home. States she still feels dehydrated, chi on exertion. Denies fever. Her loose stools stopped.

## 2023-04-09 LAB
ANION GAP SERPL CALC-SCNC: 7 MMOL/L (ref 0–18)
BUN BLD-MCNC: 6 MG/DL (ref 7–18)
CALCIUM BLD-MCNC: 8.6 MG/DL (ref 8.5–10.1)
CHLORIDE SERPL-SCNC: 112 MMOL/L (ref 98–112)
CO2 SERPL-SCNC: 22 MMOL/L (ref 21–32)
CREAT BLD-MCNC: 0.67 MG/DL
GFR SERPLBLD BASED ON 1.73 SQ M-ARVRAT: 90 ML/MIN/1.73M2 (ref 60–?)
GLUCOSE BLD-MCNC: 113 MG/DL (ref 70–99)
GLUCOSE BLD-MCNC: 123 MG/DL (ref 70–99)
GLUCOSE BLD-MCNC: 125 MG/DL (ref 70–99)
GLUCOSE BLD-MCNC: 130 MG/DL (ref 70–99)
GLUCOSE BLD-MCNC: 137 MG/DL (ref 70–99)
MAGNESIUM SERPL-MCNC: 1.8 MG/DL (ref 1.6–2.6)
OSMOLALITY SERPL CALC.SUM OF ELEC: 291 MOSM/KG (ref 275–295)
POTASSIUM SERPL-SCNC: 3.9 MMOL/L (ref 3.5–5.1)
SODIUM SERPL-SCNC: 141 MMOL/L (ref 136–145)

## 2023-04-09 PROCEDURE — 99232 SBSQ HOSP IP/OBS MODERATE 35: CPT | Performed by: HOSPITALIST

## 2023-04-09 RX ORDER — FAMOTIDINE 10 MG/ML
20 INJECTION, SOLUTION INTRAVENOUS 2 TIMES DAILY
Status: DISCONTINUED | OUTPATIENT
Start: 2023-04-09 | End: 2023-04-11

## 2023-04-09 RX ORDER — MAGNESIUM OXIDE 400 MG/1
400 TABLET ORAL 2 TIMES DAILY WITH MEALS
Status: COMPLETED | OUTPATIENT
Start: 2023-04-09 | End: 2023-04-10

## 2023-04-09 RX ORDER — FAMOTIDINE 20 MG/1
20 TABLET, FILM COATED ORAL 2 TIMES DAILY
Status: DISCONTINUED | OUTPATIENT
Start: 2023-04-09 | End: 2023-04-11

## 2023-04-09 NOTE — PLAN OF CARE
4/9 AM: Pt is A/O x 4, in no apparent distress/pain. Lung sounds are clear, on room air. BP and HR are WNL, NSR on tele. Patient reports diarrhea x 1 today. Decreased appetite. Voids, up to bathroom with walker. Ordered lidocaine patch for chronic back pain. 0.9 at 83 mL/hr. Encourage oral intake, Ensure high protein BID. Problem: Diabetes/Glucose Control  Goal: Glucose maintained within prescribed range  Description: INTERVENTIONS:  - Monitor Blood Glucose as ordered  - Assess for signs and symptoms of hyperglycemia and hypoglycemia  - Administer ordered medications to maintain glucose within target range  - Assess barriers to adequate nutritional intake and initiate nutrition consult as needed  - Instruct patient on self management of diabetes  Outcome: Progressing     Problem: SAFETY ADULT - FALL  Goal: Free from fall injury  Description: INTERVENTIONS:  - Assess pt frequently for physical needs  - Identify cognitive and physical deficits and behaviors that affect risk of falls.   - [place] fall precautions as indicated by assessment.  - Educate pt/family on patient safety including physical limitations  - Instruct pt to call for assistance with activity based on assessment  - Modify environment to reduce risk of injury  - Provide assistive devices as appropriate  - Consider OT/PT consult to assist with strengthening/mobility  - Encourage toileting schedule  Outcome: Progressing

## 2023-04-09 NOTE — PLAN OF CARE
Pt A&OX4. On RA sating WNL. . On tele. NSR. C/o headache. See MAR. IVF. QID accuchecks. Continent. Up standby. Pt updated on plan of care and verbalized understanding. Call light within reach. All needs met at this time. Problem: Diabetes/Glucose Control  Goal: Glucose maintained within prescribed range  Description: INTERVENTIONS:  - Monitor Blood Glucose as ordered  - Assess for signs and symptoms of hyperglycemia and hypoglycemia  - Administer ordered medications to maintain glucose within target range  - Assess barriers to adequate nutritional intake and initiate nutrition consult as needed  - Instruct patient on self management of diabetes  Outcome: Progressing     Problem: SAFETY ADULT - FALL  Goal: Free from fall injury  Description: INTERVENTIONS:  - Assess pt frequently for physical needs  - Identify cognitive and physical deficits and behaviors that affect risk of falls.   - [place] fall precautions as indicated by assessment.  - Educate pt/family on patient safety including physical limitations  - Instruct pt to call for assistance with activity based on assessment  - Modify environment to reduce risk of injury  - Provide assistive devices as appropriate  - Consider OT/PT consult to assist with strengthening/mobility  - Encourage toileting schedule  Outcome: Progressing     Problem: Patient/Family Goals  Goal: Patient/Family Long Term Goal  Description: Patient's Long Term Goal: To discharge with adequate resources    Interventions:  - Comply with plan of care  - See additional Care Plan goals for specific interventions  Outcome: Progressing  Goal: Patient/Family Short Term Goal  Description: Patient's Short Term Goal:   4/8 NOC: get sleep, remain comfortable    Interventions:   - Cluster care, dim lights  - See additional Care Plan goals for specific interventions  Outcome: Progressing

## 2023-04-10 LAB
GLUCOSE BLD-MCNC: 111 MG/DL (ref 70–99)
GLUCOSE BLD-MCNC: 127 MG/DL (ref 70–99)
GLUCOSE BLD-MCNC: 137 MG/DL (ref 70–99)
GLUCOSE BLD-MCNC: 144 MG/DL (ref 70–99)

## 2023-04-10 PROCEDURE — 99232 SBSQ HOSP IP/OBS MODERATE 35: CPT | Performed by: HOSPITALIST

## 2023-04-10 NOTE — PLAN OF CARE
AO x 4, RA satting >90%,  has glasses, on tele NSR, electrolyte cardiac protocol, getting lovenox, is continent of bladder and bowel, has a lidocaine patch for pain, given lomotil once for diarrhea no complaints of pain, given tylenol once for headaches during shift, on a regular diet tolerating well, up with SBA, call light within reach, bed in low locked position with alarm engaged, no further needs at this time. Problem: Diabetes/Glucose Control  Goal: Glucose maintained within prescribed range  Description: INTERVENTIONS:  - Monitor Blood Glucose as ordered  - Assess for signs and symptoms of hyperglycemia and hypoglycemia  - Administer ordered medications to maintain glucose within target range  - Assess barriers to adequate nutritional intake and initiate nutrition consult as needed  - Instruct patient on self management of diabetes  4/10/2023 1542 by Luis Antonio Goyal RN  Outcome: Progressing  4/10/2023 1542 by Luis Antonio Goyal RN  Outcome: Progressing     Problem: SAFETY ADULT - FALL  Goal: Free from fall injury  Description: INTERVENTIONS:  - Assess pt frequently for physical needs  - Identify cognitive and physical deficits and behaviors that affect risk of falls.   - [place] fall precautions as indicated by assessment.  - Educate pt/family on patient safety including physical limitations  - Instruct pt to call for assistance with activity based on assessment  - Modify environment to reduce risk of injury  - Provide assistive devices as appropriate  - Consider OT/PT consult to assist with strengthening/mobility  - Encourage toileting schedule  4/10/2023 1542 by Luis Antonio Goyal RN  Outcome: Progressing  4/10/2023 1542 by Luis Antonio Goyal RN  Outcome: Progressing     Problem: Patient/Family Goals  Goal: Patient/Family Long Term Goal  Description: Patient's Long Term Goal: To discharge with adequate resources    Interventions:  - Comply with plan of care  - See additional Care Plan goals for specific interventions  4/10/2023 1542 by Tamiko Estevez, RN  Outcome: Progressing  4/10/2023 1542 by Tamiko Estevez, RN  Outcome: Progressing  Goal: Patient/Family Short Term Goal  Description: Patient's Short Term Goal:   4/8 NOC: get sleep, remain comfortable  4/9 AM: Encourage intake  4/9 [id number] [place]: Get sleep, remain comfortable  4/10 rest, increase appetite  Interventions:   - Cluster care, dim lights  - See additional Care Plan goals for specific interventions  Outcome: Progressing

## 2023-04-10 NOTE — PLAN OF CARE
Pt A&OX4. On RA sating WNL. . On tele. NSR. C/o back pain. See MAR. IVF. QID accuchecks. Poor appetite. Encouraged PO intake. Continent. Up standby. Pt updated on plan of care and verbalized understanding. Call light within reach. All needs met at this time. Problem: Diabetes/Glucose Control  Goal: Glucose maintained within prescribed range  Description: INTERVENTIONS:  - Monitor Blood Glucose as ordered  - Assess for signs and symptoms of hyperglycemia and hypoglycemia  - Administer ordered medications to maintain glucose within target range  - Assess barriers to adequate nutritional intake and initiate nutrition consult as needed  - Instruct patient on self management of diabetes  Outcome: Progressing     Problem: SAFETY ADULT - FALL  Goal: Free from fall injury  Description: INTERVENTIONS:  - Assess pt frequently for physical needs  - Identify cognitive and physical deficits and behaviors that affect risk of falls.   - [place] fall precautions as indicated by assessment.  - Educate pt/family on patient safety including physical limitations  - Instruct pt to call for assistance with activity based on assessment  - Modify environment to reduce risk of injury  - Provide assistive devices as appropriate  - Consider OT/PT consult to assist with strengthening/mobility  - Encourage toileting schedule  Outcome: Progressing     Problem: Patient/Family Goals  Goal: Patient/Family Long Term Goal  Description: Patient's Long Term Goal: To discharge with adequate resources    Interventions:  - Comply with plan of care  - See additional Care Plan goals for specific interventions  Outcome: Progressing  Goal: Patient/Family Short Term Goal  Description: Patient's Short Term Goal:   4/8 NOC: get sleep, remain comfortable  4/9 AM: Encourage intake  4/9 NOC: Get sleep, remain comfortable  Interventions:   - Cluster care, dim lights  - See additional Care Plan goals for specific interventions  Outcome: Progressing

## 2023-04-11 ENCOUNTER — TELEPHONE (OUTPATIENT)
Dept: INTERNAL MEDICINE CLINIC | Facility: CLINIC | Age: 78
End: 2023-04-11

## 2023-04-11 VITALS
OXYGEN SATURATION: 99 % | HEART RATE: 89 BPM | SYSTOLIC BLOOD PRESSURE: 156 MMHG | HEIGHT: 60 IN | BODY MASS INDEX: 37.3 KG/M2 | DIASTOLIC BLOOD PRESSURE: 74 MMHG | WEIGHT: 190 LBS | TEMPERATURE: 98 F | RESPIRATION RATE: 18 BRPM

## 2023-04-11 LAB
GLUCOSE BLD-MCNC: 109 MG/DL (ref 70–99)
GLUCOSE BLD-MCNC: 132 MG/DL (ref 70–99)

## 2023-04-11 PROCEDURE — 99238 HOSP IP/OBS DSCHRG MGMT 30/<: CPT | Performed by: HOSPITALIST

## 2023-04-11 RX ORDER — DIPHENOXYLATE HYDROCHLORIDE AND ATROPINE SULFATE 2.5; .025 MG/1; MG/1
1 TABLET ORAL 4 TIMES DAILY PRN
Qty: 10 TABLET | Refills: 0 | Status: SHIPPED | OUTPATIENT
Start: 2023-04-11

## 2023-04-11 RX ORDER — HYDRALAZINE HYDROCHLORIDE 20 MG/ML
10 INJECTION INTRAMUSCULAR; INTRAVENOUS EVERY 4 HOURS PRN
Status: DISCONTINUED | OUTPATIENT
Start: 2023-04-11 | End: 2023-04-11

## 2023-04-11 NOTE — TELEPHONE ENCOUNTER
Pt stated she is currently in the hospital positive for covid she has been there for 5 days. Pt is looking in to [facility] she stated she is going to be contacting her employer to send us the paperwork. She just wants AMS aware and hoping she will complete the paperwork. Aware of paperwork new process. No call back required.

## 2023-04-11 NOTE — PLAN OF CARE
Pt AOx4. VSS,afebrile. Spo2 >90% on rA. NSR on tele, EP(cardiac), lovenox. No c/o pain, N/V/D/C. Up SBA. QID accucheck. IVF. Pt updated on POC. Call light within reach, safety precautions in place. No further pt needs at this time. Problem: Diabetes/Glucose Control  Goal: Glucose maintained within prescribed range  Description: INTERVENTIONS:  - Monitor Blood Glucose as ordered  - Assess for signs and symptoms of hyperglycemia and hypoglycemia  - Administer ordered medications to maintain glucose within target range  - Assess barriers to adequate nutritional intake and initiate nutrition consult as needed  - Instruct patient on self management of diabetes  Outcome: Progressing     Problem: SAFETY ADULT - FALL  Goal: Free from fall injury  Description: INTERVENTIONS:  - Assess pt frequently for physical needs  - Identify cognitive and physical deficits and behaviors that affect risk of falls.   - [place] fall precautions as indicated by assessment.  - Educate pt/family on patient safety including physical limitations  - Instruct pt to call for assistance with activity based on assessment  - Modify environment to reduce risk of injury  - Provide assistive devices as appropriate  - Consider OT/PT consult to assist with strengthening/mobility  - Encourage toileting schedule  Outcome: Progressing     Problem: Patient/Family Goals  Goal: Patient/Family Long Term Goal  Description: Patient's Long Term Goal: To discharge with adequate resources    Interventions:  - Comply with plan of care  - See additional Care Plan goals for specific interventions  Outcome: Progressing  Goal: Patient/Family Short Term Goal  Description: Patient's Short Term Goal:   4/8 NOC: get sleep, remain comfortable  4/9 AM: Encourage intake  4/9 NOC: Get sleep, remain comfortable  4/10noc: sleep  Interventions:   - Cluster care, dim lights  - See additional Care Plan goals for specific interventions  Outcome: Progressing

## 2023-04-11 NOTE — PROGRESS NOTES
NURSING DISCHARGE NOTE    Discharged Home via Wheelchair. Accompanied by RN  Belongings Taken by patient/family. Patient IV removed, went over AVS with patient including any medication changes, side effects, any up coming appointments, and addressing all questions or concerns, no further needs.

## 2023-04-12 ENCOUNTER — TELEPHONE (OUTPATIENT)
Dept: INTERNAL MEDICINE CLINIC | Facility: CLINIC | Age: 78
End: 2023-04-12

## 2023-04-12 ENCOUNTER — PATIENT OUTREACH (OUTPATIENT)
Dept: CASE MANAGEMENT | Age: 78
End: 2023-04-12

## 2023-04-12 DIAGNOSIS — Z02.9 ENCOUNTERS FOR UNSPECIFIED ADMINISTRATIVE PURPOSE: ICD-10-CM

## 2023-04-12 PROCEDURE — 1111F DSCHRG MED/CURRENT MED MERGE: CPT

## 2023-04-12 NOTE — PROGRESS NOTES
[facility] contact information provided as well as [facility] office number, [phone].

## 2023-04-12 NOTE — PROGRESS NOTES
[facility] for post hospital follow up. [facility] contact information provided as well as [facility] office number, [phone].

## 2023-04-12 NOTE — TELEPHONE ENCOUNTER
Received Fax from [facility] [address] Provider Medical Certification. Attached email and sent to forms dept. Sent original through [employer].

## 2023-04-14 NOTE — TELEPHONE ENCOUNTER
Disability forms/ Health care provider medical certification forms received from [employer]. My Chart message sent for Auth/ BLAIR. Logged for processing.

## 2023-04-17 RX ORDER — TRIAMTERENE AND HYDROCHLOROTHIAZIDE 37.5; 25 MG/1; MG/1
1 TABLET ORAL DAILY
Qty: 90 TABLET | Refills: 1 | Status: SHIPPED | OUTPATIENT
Start: 2023-04-17

## 2023-04-17 NOTE — TELEPHONE ENCOUNTER
Last visit- 12/09/2022 cpe     Last refill- 11/16/2021 triamterene-hydrochlorothiazide 37.5-25mg QTY90 1R    Last labs- 04/08/2023 cbc, cmp  Ordered by Maryjane Gao DO    No future appointments.

## 2023-04-17 NOTE — TELEPHONE ENCOUNTER
Returned pts call. Pt wanted to know if we had received her [employer] message response for [employer] auth. Informed pt we had received it. Pt is looking for disab start date 4/6/23 with a return to work date of 5/1/23.  Informed pt we would need to send pcp a message for approval.

## 2023-04-19 ENCOUNTER — TELEPHONE (OUTPATIENT)
Dept: INTERNAL MEDICINE CLINIC | Facility: CLINIC | Age: 78
End: 2023-04-19

## 2023-04-19 NOTE — TELEPHONE ENCOUNTER
Pt called to inquire about her FMLA paperwork and I gave her the number to forms dept to check on it    She also was asking if AMS can give her rx for anxiet/sleep deprived-informed her she will need appt to discuss going on meds-I offered appt and she said she has to take her dog to vet which is causing her anxiety as well and will call us to make appt if she feels she still wants to come in.

## 2023-04-20 ENCOUNTER — TELEPHONE (OUTPATIENT)
Dept: INTERNAL MEDICINE CLINIC | Facility: CLINIC | Age: 78
End: 2023-04-20

## 2023-04-20 RX ORDER — TRIAMTERENE AND HYDROCHLOROTHIAZIDE 37.5; 25 MG/1; MG/1
1 TABLET ORAL DAILY
Qty: 90 TABLET | Refills: 0 | OUTPATIENT
Start: 2023-04-20

## 2023-04-20 NOTE — TELEPHONE ENCOUNTER
Future Appointments   Date Time Provider Courtney Desai   4/24/2023  3:00 PM Sylvester Limon DO [facility]     Patient wants to discuss some things with nurse; patient is expected to return on Monday and patient is not ready yet. Patient is exhausted and can't sleep. Wants to talk with a nurse.

## 2023-04-20 NOTE — TELEPHONE ENCOUNTER
disab received in forms dept. Logged formprocessing. Sent [employer] message for missing auth for [employer] # [phone].

## 2023-04-24 ENCOUNTER — OFFICE VISIT (OUTPATIENT)
Dept: INTERNAL MEDICINE CLINIC | Facility: CLINIC | Age: 78
End: 2023-04-24
Payer: COMMERCIAL

## 2023-04-24 VITALS
HEIGHT: 60 IN | DIASTOLIC BLOOD PRESSURE: 70 MMHG | OXYGEN SATURATION: 98 % | WEIGHT: 171.81 LBS | BODY MASS INDEX: 33.73 KG/M2 | HEART RATE: 99 BPM | SYSTOLIC BLOOD PRESSURE: 134 MMHG

## 2023-04-24 DIAGNOSIS — Q76.2 CONGENITAL SPONDYLOLISTHESIS: ICD-10-CM

## 2023-04-24 DIAGNOSIS — U07.1 COVID-19 VIRUS INFECTION: Primary | ICD-10-CM

## 2023-04-24 DIAGNOSIS — Z98.890 S/P LUMBAR LAMINECTOMY: ICD-10-CM

## 2023-04-24 DIAGNOSIS — G47.00 INSOMNIA, UNSPECIFIED TYPE: ICD-10-CM

## 2023-04-24 DIAGNOSIS — R53.1 WEAKNESS GENERALIZED: ICD-10-CM

## 2023-04-24 DIAGNOSIS — E86.0 DEHYDRATION: ICD-10-CM

## 2023-04-24 DIAGNOSIS — M48.061 DEGENERATIVE LUMBAR SPINAL STENOSIS: ICD-10-CM

## 2023-04-24 PROCEDURE — 3078F DIAST BP <80 MM HG: CPT | Performed by: FAMILY MEDICINE

## 2023-04-24 PROCEDURE — 3008F BODY MASS INDEX DOCD: CPT | Performed by: FAMILY MEDICINE

## 2023-04-24 PROCEDURE — 3075F SYST BP GE 130 - 139MM HG: CPT | Performed by: FAMILY MEDICINE

## 2023-04-24 PROCEDURE — 1111F DSCHRG MED/CURRENT MED MERGE: CPT | Performed by: FAMILY MEDICINE

## 2023-04-24 PROCEDURE — 99214 OFFICE O/P EST MOD 30 MIN: CPT | Performed by: FAMILY MEDICINE

## 2023-04-24 RX ORDER — MIRTAZAPINE 7.5 MG/1
7.5 TABLET, FILM COATED ORAL NIGHTLY
Qty: 30 TABLET | Refills: 0 | Status: SHIPPED | OUTPATIENT
Start: 2023-04-24

## 2023-04-24 NOTE — TELEPHONE ENCOUNTER
[id number] Shellie Carter for paperwork completion. Ok to return to work 5/1/23. Hospital f/u completed. See Harshil 4/12/23 and 4/17/23.

## 2023-04-25 ENCOUNTER — TELEPHONE (OUTPATIENT)
Dept: INTERNAL MEDICINE CLINIC | Facility: CLINIC | Age: 78
End: 2023-04-25

## 2023-04-25 RX ORDER — PREGABALIN 100 MG/1
100 CAPSULE ORAL 2 TIMES DAILY
Qty: 180 CAPSULE | Refills: 1 | COMMUNITY
Start: 2023-04-25

## 2023-04-25 NOTE — PROGRESS NOTES
Multiple attempts to reach pt and messages left with no return call. Patient went in for HFU appt with PCP on 4/24/23. Encounter closing.

## 2023-04-25 NOTE — TELEPHONE ENCOUNTER
Pt is concerned about the Lyrica being removed from her medication list. Calling to have AMS to add it back on to her list she doesn't need a refill.

## 2023-04-26 NOTE — TELEPHONE ENCOUNTER
Dr. Robb Mendoza     Please sign off on form if you agree to: disab due to hospital stay and covid start date 4/8/23-4/30/23  (Please place your signature on the first page only)    -Within your inbasket, Highlight the patient and hit \"Chart\" button. -In Chart Review, w/in the Encounter tab - click 1 time on the Telephone call encounter for 4/19/23 Scroll down the telephone encounter.  -Click \"scan on\" blue Hyperlink under \"Media\" heading for Disab Dr Robb Mendoza 4/26/23  w/in the telephone enc.  -Click on Acknowledge button at the bottom right corner and left-click onto image, signature stamp appears and drag signature to Provider signature line. Stamp will turn blue. Close window.      Thank you,    Emigdio Bedolla

## 2023-05-03 ENCOUNTER — TELEPHONE (OUTPATIENT)
Dept: INTERNAL MEDICINE CLINIC | Facility: CLINIC | Age: 78
End: 2023-05-03

## 2023-05-03 NOTE — TELEPHONE ENCOUNTER
Please call patient. I received [facility] paperwork for patient stating that the last date she was at work was 3/29/23 but that we put her first day out of work not until 4/8/23, which was the day she went to ER. I also see TE from 4/4/23 that we had called her and at the time she said she'd call back because she was on lunch for work. What was her last day of work prior to this illness?

## 2023-05-05 NOTE — TELEPHONE ENCOUNTER
Patient states she called off work on 3/23, 3/24, worked 3/27, 3/28, 3/29, called off 3/30, 3/31, worked 4/3, 4/4, 4/5 but 4/5 only worked a 1/2 day, 4/6 pt went to the ER, called in sick on 4/6, 4/7 then back to the Er on 4/78 and was admitted, discharged from hospital on 4/11/23 and back to work on 5/1. Info to [facility].

## 2023-06-27 RX ORDER — DILTIAZEM HYDROCHLORIDE 240 MG/1
CAPSULE, COATED, EXTENDED RELEASE ORAL
Qty: 90 CAPSULE | Refills: 1 | Status: SHIPPED | OUTPATIENT
Start: 2023-06-27

## 2023-06-27 RX ORDER — LOSARTAN POTASSIUM 50 MG/1
TABLET ORAL
Qty: 90 TABLET | Refills: 1 | Status: SHIPPED | OUTPATIENT
Start: 2023-06-27

## 2023-07-03 RX ORDER — PREGABALIN 100 MG/1
100 CAPSULE ORAL 2 TIMES DAILY
Qty: 180 CAPSULE | Refills: 1 | Status: SHIPPED | OUTPATIENT
Start: 2023-07-03

## 2023-07-27 NOTE — TELEPHONE ENCOUNTER
To whom it may concern:    Ms Jazlyn Bartlett was seen in our office today July 27th, 2023  for an epidural for pain management.      She has no restrictions on her activity post procedure.      Sincerely,        Linda Mccann MD  Pain Management/Anesthesiology                 It's probably expensive, she could try to find a coupon on the [facility] site. Or has there been any other med that has helped in the past? Gabapentin?  Does she want to see Pain specialist?

## 2023-09-20 ENCOUNTER — TELEPHONE (OUTPATIENT)
Dept: INTERNAL MEDICINE CLINIC | Facility: CLINIC | Age: 78
End: 2023-09-20

## 2023-09-20 NOTE — TELEPHONE ENCOUNTER
Patient Diabetic Eye Exam. [facility]. Adri Nunez MD. Placed in AMS basket for review. Abstracted. Will send to scan after.

## 2024-01-07 DIAGNOSIS — E11.9 TYPE 2 DIABETES MELLITUS WITHOUT COMPLICATION, WITHOUT LONG-TERM CURRENT USE OF INSULIN (HCC): Primary | ICD-10-CM

## 2024-01-07 DIAGNOSIS — Z00.00 ROUTINE GENERAL MEDICAL EXAMINATION AT A HEALTH CARE FACILITY: ICD-10-CM

## 2024-01-08 RX ORDER — LEVOTHYROXINE SODIUM 25 MCG
25 TABLET ORAL
Qty: 90 TABLET | Refills: 0 | Status: SHIPPED | OUTPATIENT
Start: 2024-01-08

## 2024-01-08 NOTE — TELEPHONE ENCOUNTER
Please call patient to schedule annual check-up. Fasting lab orders are at [facility]. She's overdue.

## 2024-01-08 NOTE — TELEPHONE ENCOUNTER
Requested Prescriptions     Pending Prescriptions Disp Refills    SYNTHROID 25 MCG Oral Tab [Pharmacy Med Name: Synthroid 25 Mcg Tab Abbv] 90 tablet 0     Sig: Take 1 tablet (25 mcg total) by mouth before breakfast.       LOV:4/24/23 AMS    LAST CPE:12/9/22 AMS ( due for annual,labs pended)    Last Labs:4/8/23 cbc,cmp,tsh    Last Refill:  SYNTHROID 25 MCG Oral Tab 90 tablet 2 3/1/2023 --   Sig:   Take 1 tablet (25 mcg total) by mouth before breakfast.     Route:   Oral     Note to Pharmacy:   Prescriber changed. Previous call reason Refill Request.     YUNIEL:   Yes     Order #:   [id number]

## 2024-01-17 NOTE — TELEPHONE ENCOUNTER
Future Appointments   Date Time Provider Department Center   2/16/2024  9:00 AM Taylor Dupont,  [facility]

## 2024-01-21 DIAGNOSIS — G47.00 INSOMNIA, UNSPECIFIED TYPE: Primary | ICD-10-CM

## 2024-01-22 RX ORDER — PREGABALIN 100 MG/1
100 CAPSULE ORAL 2 TIMES DAILY
Qty: 180 CAPSULE | Refills: 1 | Status: SHIPPED | OUTPATIENT
Start: 2024-01-22

## 2024-01-22 NOTE — TELEPHONE ENCOUNTER
Requested Prescriptions     Pending Prescriptions Disp Refills    PREGABALIN 100 MG Oral Cap [Pharmacy Med Name: Pregabalin 100 Mg Cap Nova] 180 capsule 0     Sig: Take 1 capsule (100 mg total) by mouth 2 (two) times daily.       LOV:4/24/23 AMS    LAST CPE:12/9/22 AMS ( due for annual, labs active 1/8/24)    Last Labs:4/8/23 tsh,cbc,cmp    Last Refill:  Medication Quantity Refills Start End   pregabalin 100 MG Oral Cap 180 capsule 1 7/3/2023 --   Sig:   Take 1 capsule (100 mg total) by mouth 2 (two) times daily.     Route:   Oral     Order #:   [id number]         Your appointments       Date & Time Appointment Department ([place])    Feb 16, 2024  9:00 AM CST Physical - Established with Taylor Dupont DO [facility], [address] [place] [address] ([facility] IM/[facility] [place])              [address]  [facility] IM/[place] [address] [address] [place] [id number]  [phone]

## 2024-01-31 RX ORDER — LOSARTAN POTASSIUM 50 MG/1
TABLET ORAL
Qty: 90 TABLET | Refills: 0 | Status: SHIPPED | OUTPATIENT
Start: 2024-01-31

## 2024-01-31 NOTE — TELEPHONE ENCOUNTER
Requested Prescriptions     Pending Prescriptions Disp Refills    LOSARTAN 50 MG Oral Tab [Pharmacy Med Name: Losartan Potassium 50 MG Oral Tablet] 90 tablet 3     Sig: TAKE 1 TABLET BY MOUTH DAILY       LOV:4/24/23 AMS    LAST CPE:12/9/22 AMS ( due for annual, active labs 1/8/24)    Last Labs:4/8/23 tsh,cbc & cmp    Last Refill:  Medication Quantity Refills Start End   losartan 50 MG Oral Tab 90 tablet 1 6/27/2023 --   Sig:   TAKE 1 TABLET BY MOUTH  DAILY     Route:   (none)     Note to Pharmacy:   Requesting 1 year supply     Order #:   [id number]         Your appointments       Date & Time Appointment Department ([place])    Feb 16, 2024  9:00 AM CST Physical - Established with Taylor Dupont DO [facility], [address] [place] [address] ([facility] IM/[facility] [place])              [facility], [address] [place] [address]  [facility] [place]  [address] [place] [id number]  [phone]

## 2024-02-13 ENCOUNTER — LAB ENCOUNTER (OUTPATIENT)
Dept: LAB | Age: 79
End: 2024-02-13
Attending: FAMILY MEDICINE
Payer: COMMERCIAL

## 2024-02-13 DIAGNOSIS — Z00.00 ROUTINE GENERAL MEDICAL EXAMINATION AT A HEALTH CARE FACILITY: ICD-10-CM

## 2024-02-13 DIAGNOSIS — E11.9 TYPE 2 DIABETES MELLITUS WITHOUT COMPLICATION, WITHOUT LONG-TERM CURRENT USE OF INSULIN (HCC): ICD-10-CM

## 2024-02-13 LAB
ALBUMIN SERPL-MCNC: 3.7 G/DL (ref 3.4–5)
ALBUMIN/GLOB SERPL: 0.9 {RATIO} (ref 1–2)
ALP LIVER SERPL-CCNC: 79 U/L
ALT SERPL-CCNC: 27 U/L
ANION GAP SERPL CALC-SCNC: 8 MMOL/L (ref 0–18)
AST SERPL-CCNC: 30 U/L (ref 15–37)
BASOPHILS # BLD AUTO: 0.02 X10(3) UL (ref 0–0.2)
BASOPHILS NFR BLD AUTO: 0.2 %
BILIRUB SERPL-MCNC: 0.8 MG/DL (ref 0.1–2)
BUN BLD-MCNC: 20 MG/DL (ref 9–23)
CALCIUM BLD-MCNC: 9.4 MG/DL (ref 8.5–10.1)
CHLORIDE SERPL-SCNC: 108 MMOL/L (ref 98–112)
CHOLEST SERPL-MCNC: 155 MG/DL (ref ?–200)
CO2 SERPL-SCNC: 25 MMOL/L (ref 21–32)
CREAT BLD-MCNC: 0.91 MG/DL
CREAT UR-SCNC: 39.2 MG/DL
EGFRCR SERPLBLD CKD-EPI 2021: 65 ML/MIN/1.73M2 (ref 60–?)
EOSINOPHIL # BLD AUTO: 0.11 X10(3) UL (ref 0–0.7)
EOSINOPHIL NFR BLD AUTO: 1.3 %
ERYTHROCYTE [DISTWIDTH] IN BLOOD BY AUTOMATED COUNT: 12.8 %
EST. AVERAGE GLUCOSE BLD GHB EST-MCNC: 174 MG/DL (ref 68–126)
FASTING PATIENT LIPID ANSWER: YES
FASTING STATUS PATIENT QL REPORTED: YES
GLOBULIN PLAS-MCNC: 4.1 G/DL (ref 2.8–4.4)
GLUCOSE BLD-MCNC: 191 MG/DL (ref 70–99)
HBA1C MFR BLD: 7.7 % (ref ?–5.7)
HCT VFR BLD AUTO: 42.8 %
HDLC SERPL-MCNC: 48 MG/DL (ref 40–59)
HGB BLD-MCNC: 14.2 G/DL
IMM GRANULOCYTES # BLD AUTO: 0.02 X10(3) UL (ref 0–1)
IMM GRANULOCYTES NFR BLD: 0.2 %
LDLC SERPL CALC-MCNC: 72 MG/DL (ref ?–100)
LYMPHOCYTES # BLD AUTO: 3.53 X10(3) UL (ref 1–4)
LYMPHOCYTES NFR BLD AUTO: 42.9 %
MCH RBC QN AUTO: 30.7 PG (ref 26–34)
MCHC RBC AUTO-ENTMCNC: 33.2 G/DL (ref 31–37)
MCV RBC AUTO: 92.4 FL
MICROALBUMIN UR-MCNC: <0.5 MG/DL
MONOCYTES # BLD AUTO: 0.75 X10(3) UL (ref 0.1–1)
MONOCYTES NFR BLD AUTO: 9.1 %
NEUTROPHILS # BLD AUTO: 3.79 X10 (3) UL (ref 1.5–7.7)
NEUTROPHILS # BLD AUTO: 3.79 X10(3) UL (ref 1.5–7.7)
NEUTROPHILS NFR BLD AUTO: 46.3 %
NONHDLC SERPL-MCNC: 107 MG/DL (ref ?–130)
OSMOLALITY SERPL CALC.SUM OF ELEC: 300 MOSM/KG (ref 275–295)
PLATELET # BLD AUTO: 241 10(3)UL (ref 150–450)
POTASSIUM SERPL-SCNC: 3.5 MMOL/L (ref 3.5–5.1)
PROT SERPL-MCNC: 7.8 G/DL (ref 6.4–8.2)
RBC # BLD AUTO: 4.63 X10(6)UL
SODIUM SERPL-SCNC: 141 MMOL/L (ref 136–145)
TRIGL SERPL-MCNC: 210 MG/DL (ref 30–149)
TSI SER-ACNC: 0.42 MIU/ML (ref 0.36–3.74)
VLDLC SERPL CALC-MCNC: 32 MG/DL (ref 0–30)
WBC # BLD AUTO: 8.2 X10(3) UL (ref 4–11)

## 2024-02-13 PROCEDURE — 80061 LIPID PANEL: CPT

## 2024-02-13 PROCEDURE — 84443 ASSAY THYROID STIM HORMONE: CPT

## 2024-02-13 PROCEDURE — 36415 COLL VENOUS BLD VENIPUNCTURE: CPT

## 2024-02-13 PROCEDURE — 83036 HEMOGLOBIN GLYCOSYLATED A1C: CPT

## 2024-02-13 PROCEDURE — 82570 ASSAY OF URINE CREATININE: CPT

## 2024-02-13 PROCEDURE — 85025 COMPLETE CBC W/AUTO DIFF WBC: CPT

## 2024-02-13 PROCEDURE — 80053 COMPREHEN METABOLIC PANEL: CPT

## 2024-02-13 PROCEDURE — 82043 UR ALBUMIN QUANTITATIVE: CPT

## 2024-02-16 ENCOUNTER — OFFICE VISIT (OUTPATIENT)
Dept: INTERNAL MEDICINE CLINIC | Facility: CLINIC | Age: 79
End: 2024-02-16
Payer: COMMERCIAL

## 2024-02-16 VITALS
BODY MASS INDEX: 36 KG/M2 | HEART RATE: 84 BPM | OXYGEN SATURATION: 97 % | SYSTOLIC BLOOD PRESSURE: 130 MMHG | DIASTOLIC BLOOD PRESSURE: 66 MMHG | WEIGHT: 181.81 LBS

## 2024-02-16 DIAGNOSIS — Z00.00 ENCOUNTER FOR ANNUAL HEALTH EXAMINATION: Primary | ICD-10-CM

## 2024-02-16 DIAGNOSIS — E03.9 HYPOTHYROIDISM, UNSPECIFIED TYPE: ICD-10-CM

## 2024-02-16 DIAGNOSIS — I10 PRIMARY HYPERTENSION: ICD-10-CM

## 2024-02-16 DIAGNOSIS — E11.9 TYPE 2 DIABETES MELLITUS WITHOUT COMPLICATION, WITHOUT LONG-TERM CURRENT USE OF INSULIN (HCC): ICD-10-CM

## 2024-02-16 NOTE — PATIENT INSTRUCTIONS
Alicia Barragan's SCREENING SCHEDULE   Tests on this list are recommended by your physician but may not be covered, or covered at this frequency, by your insurer.   Please check with your insurance carrier before scheduling to verify coverage.   PREVENTATIVE SERVICES FREQUENCY &  COVERAGE DETAILS LAST COMPLETION DATE   Diabetes Screening    Fasting Blood Sugar /  Glucose    One screening every 12 months if never tested or if previously tested but not diagnosed with pre-diabetes   One screening every 6 months if diagnosed with pre-diabetes Lab Results   Component Value Date     (H) 02/13/2024        Cardiovascular Disease Screening    Lipid Panel  Cholesterol  Lipoprotein (HDL)  Triglycerides Covered every 5 years for all Medicare beneficiaries without apparent signs or symptoms of cardiovascular disease Lab Results   Component Value Date    CHOLEST 155 02/13/2024    HDL 48 02/13/2024    LDL 72 02/13/2024    TRIG 210 (H) 02/13/2024         Electrocardiogram (EKG)   Covered if needed at Welcome to Medicare, and non-screening if indicated for medical reasons 04/08/2023      Ultrasound Screening for Abdominal Aortic Aneurysm (AAA) Covered once in a lifetime for one of the following risk factors   • Men who are 65-75 years old and have ever smoked   • Anyone with a family history -     Colorectal Cancer Screening  Covered for ages 50-85; only need ONE of the following:    Colonoscopy   Covered every 10 years    Covered every 2 years if patient is at high risk or previous colonoscopy was abnormal 06/03/2016    Health Maintenance   Topic Date Due   • Colorectal Cancer Screening  06/03/2026       Flexible Sigmoidoscopy   Covered every 4 years -    Fecal Occult Blood Test Covered annually -   Bone Density Screening    Bone density screening    Covered every 2 years after age 65 if diagnosed with risk of osteoporosis or estrogen deficiency.    Covered yearly for long-term glucocorticoid medication use (Steroids) Last  Dexa Scan:    XR DEXA BONE DENSITOMETRY (CPT=77080) 05/20/2017      No recommendations at this time   Pap and Pelvic    Pap   Covered every 2 years for women at normal risk; Annually if at high risk -  No recommendations at this time    Chlamydia Annually if high risk -  No recommendations at this time   Screening Mammogram    Mammogram     Recommend annually for all female patients aged 40 and older    One baseline mammogram covered for patients aged 35-39 -    No recommendations at this time    Immunizations    Influenza Covered once per flu season  Please get every year -  Influenza Vaccine(1) due on 10/01/2023    Pneumococcal Each vaccine (Htqbuts36 & Adzrscvev21) covered once after 65 Prevnar 13: 05/17/2019    [id number]: 10/22/2021     No recommendations at this time    Hepatitis B One screening covered for patients with certain risk factors   -  No recommendations at this time    Tetanus Toxoid Not covered by Medicare Part B unless medically necessary (cut with metal); may be covered with your pharmacy prescription benefits -    Tetanus, Diptheria and Pertusis TD and TDaP Not covered by Medicare Part B -  No recommendations at this time    Zoster Not covered by Medicare Part B; may be covered with your pharmacy  prescription benefits -  No recommendations at this time     Diabetes      Hemoglobin A1C Annually; if last result is elevated, may be asked to retest more frequently.    Medicare covers every 3 months Lab Results   Component Value Date     (H) 02/13/2024    A1C 7.7 (H) 02/13/2024       No recommendations at this time    Creat/alb ratio Annually Lab Results   Component Value Date    MICROALBCREA  02/13/2024      Comment:      Unable to calculate due to Urine Microalbumin <0.5 mg/dL         LDL Annually Lab Results   Component Value Date    LDL 72 02/13/2024       Dilated Eye Exam Annually [unfilled]     Annual Monitoring of Persistent Medications (ACE/ARB, digoxin diuretics,  anticonvulsants)    Potassium Annually Lab Results   Component Value Date    K 3.5 02/13/2024         Creatinine   Annually Lab Results   Component Value Date    CREATSERUM 0.91 02/13/2024         BUN Annually Lab Results   Component Value Date    BUN 20 02/13/2024       Drug Serum Conc Annually No results found for: \"DIGOXIN\", \"DIG\", \"VALP\"

## 2024-02-16 NOTE — PROGRESS NOTES
Subjective:   Alicia Barragan is a 78 year old female who presents for a Medicare Subsequent Annual Wellness visit (Pt already had Initial Annual Wellness) and scheduled follow up of multiple significant but stable problems.   1. Encounter for annual health examination (Primary)- doing well. Keeping active.   2. Type 2 diabetes mellitus without complication, without long-term current use of insulin ([facility])- watching carbs and sugars.   3. Hypothyroidism, unspecified type- taking thyroid med as prescribed with no issues.   4. Primary hypertension- taking med as prescribed with no issues.       History/Other:   Fall Risk Assessment:   She has been screened for Falls and is High Risk. Fall Prevention information provided to patient in After Visit Summary.    Do you feel unsteady when standing or walking?: Yes  Do you worry about falling?: Yes  Have you fallen in the past year?: No     Cognitive Assessment:   She had a completely normal cognitive assessment - see flowsheet entries     Functional Ability/Status:   Alicia Barragan has a completely normal functional assessment. See flowsheet for details.        Depression Screening (PHQ-2/PHQ-9): PHQ-2 SCORE: 0  , done 2/16/2024             Advanced Directives:   She does NOT have a Living Will. [Do you have a living will?: No]  She does NOT have a Power of  for Health Care. [Do you have a healthcare power of ?: No]  Patient has Advance Care Planning documents but we do not have a copy in EMR. Discussed Advanced Care Planning with patient and instructed patient to get our office a copy to be scanned into EMR.      Patient Active Problem List   Diagnosis    Degenerative lumbar spinal stenosis    Postoperative seroma    S/P lumbar laminectomy    Congenital spondylolisthesis    Spondylosis of unspecified site without mention of myelopathy    Neuralgia, neuritis, and radiculitis, unspecified    DM type 2 (diabetes mellitus, type 2) (HCC)    Hypertension     Hypothyroidism    Anemia    Thoracic or lumbosacral neuritis or radiculitis, unspecified    Valvular disease    Esophageal reflux    External hemorrhoids with other complication    Rectocele    Diverticulosis    IBS (irritable bowel syndrome)    Migraine headache    SI (sacroiliac) joint dysfunction    Chronic right SI joint pain    Lumbar spondylosis    Right leg pain    Right hip pain    Spondylolisthesis of lumbar region    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    Current Medications:  Outpatient Medications Marked as Taking for the 2/16/24 encounter (Office Visit) with Taylor Dupont DO   Medication Sig    losartan 50 MG Oral Tab TAKE 1 TABLET BY MOUTH DAILY    pregabalin 100 MG Oral Cap Take 1 capsule (100 mg total) by mouth 2 (two) times daily.    SYNTHROID 25 MCG Oral Tab Take 1 tablet (25 mcg total) by mouth before breakfast.    dilTIAZem  MG Oral Capsule SR 24 Hr TAKE 1 CAPSULE BY MOUTH  DAILY    METHOCARBAMOL 750 MG Oral Tab Take 1 tablet (750 mg total) by mouth 3 (three) times daily.    Omega-3 1400 MG Oral Cap Take by mouth.    Naproxen Sodium (ALEVE OR) Take 3 tablets by mouth daily.    vitamin E 400 UNITS Oral Cap Take 1 capsule (400 Units total) by mouth daily.    Cholecalciferol (VITAMIN D) 1000 UNITS Oral Cap Take 1 capsule by mouth daily.    Cyanocobalamin (VITAMIN B 12 OR) Take 1,000 Units by mouth daily.    Multiple Vitamin (MULTI-VITAMIN) Oral Tab Take 1 tablet by mouth daily.    acetaminophen (TYLENOL EXTRA STRENGTH) 500 MG Oral Tab Take 2 tablets by mouth every 6 (six) hours as needed for 10 days.    magnesium oxide (MAG-OX) 400  MG Oral Tab Take 1 tablet by mouth 2 (two) times daily. Indications: Low Amount of Magnesium in the Blood    aspirin 81 MG Oral Tab Take 1 tablet (81 mg total) by mouth daily.       Medical History:  She  has a past medical history of Anal pain (11/7/2013), Anemia (6/18/2013), Back problem, Closed fracture of lateral malleolus (6/20/2013), Congenital spondylolisthesis (3/1/2013), Degeneration of lumbar or lumbosacral intervertebral disc (6/20/2013), Degenerative lumbar spinal stenosis (1/28/2010), Diabetes ([facility]), Disturbance of skin sensation (6/20/2013), Diverticulosis, DM type 2 (diabetes mellitus, type 2) ([facility]) (6/15/2013), Esophageal reflux, External hemorrhoids with other complication (11/7/2013), Gastrointestinal bleeding (11/7/2013), Hypertension, Hypertension, Hypothyroidism (6/15/2013), Hypothyroidism, IBS (irritable bowel syndrome), Internal hemorrhoids with other complication (11/7/2013), Migraine headache, Muscle weakness, Neuralgia, neuritis, and radiculitis, unspecified (3/1/2013), Obese, Other ill-defined conditions(799.89), Postoperative seroma (3/1/2013), Rectocele (11/7/2013), S/P lumbar laminectomy (3/1/2013), Spinal stenosis, lumbar (3/18/2009), Spondylosis of unspecified site without mention of myelopathy (3/1/2013), Thoracic or lumbosacral neuritis or radiculitis, unspecified (6/20/2013), Type II or unspecified type diabetes mellitus without mention of complication, not stated as uncontrolled, Unspecified closed fracture of ankle (6/20/2013), Unspecified disorder of thyroid, Valvular disease, and Visual impairment.  Surgical History:  She  has a past surgical history that includes other surgical history; other surgical history (1993); other surgical history; injection, w/wo contrast, dx/therapeutic substance, epidural/subarachnoid; lumbar/sacral (3/1/2013); m-sedaj by sm phys perfrmg svc 5+ yr (3/1/2013); fluor gid & loclzj ndl/cath spi dx/ther njx (3/1/2013); injection, w/wo contrast,  dx/therapeutic substance, epidural/subarachnoid; lumbar/sacral (3/22/2013); m-sedaj by  phys perfrmg svc 5+ yr (3/22/2013); epidurography, radiological s & i (3/22/2013); injection, w/wo contrast, dx/therapeutic substance, epidural/subarachnoid; lumbar/sacral (4/17/2013); m-sedaj by  phys perfrmg svc 5+ yr (4/17/2013); fluor gid & loclzj ndl/cath spi dx/ther njx (4/17/2013); orthopedic surg (pbp); other (2011); other; colonoscopy (2010); hernia surgery (2011); d & c (1975); herber localization wire 1 site right (cpt=19281); other surgical history (2011); ligation of hemorrhoid(s); injection proc for sacroiliac joint arthrography &/or anesthetic/steroid (Right, 5/16/2014); m-sedaj by  phys perfrmg svc 5+ yr (Right, 5/16/2014); injection proc for sacroiliac joint arthrography &/or anesthetic/steroid (N/A, 6/9/2014); m-sedaj by  phys perfrmg svc 5+ yr (N/A, 6/9/2014); inject nerv blck,othr periph nerv (Right, 6/23/2014); inject nerv blck,othr periph nerv (Right, 6/23/2014); inject nerv blck,othr periph nerv (Right, 6/23/2014); inject nerv blck,othr periph nerv (Right, 6/23/2014); m-sedaj by  phys perfrmg svc 5+ yr (Right, 6/23/2014); inject nerv blck,othr periph nerv (Right, 7/14/2014); inject nerv blck,othr periph nerv (Right, 7/14/2014); inject nerv blck,othr periph nerv (Right, 7/14/2014); inject nerv blck,othr periph nerv (Right, 7/14/2014); fluoroscopic guidance needle placement (Right, 7/14/2014); m-sedaj by johnny león perfrmg svc 5+ yr (Right, 7/14/2014); and hemorrhoidectomy (1/18/17).   Family History:  Her family history is not on file.  Social History:  She  reports that she quit smoking about 50 years ago. Her smoking use included cigarettes. She has never used smokeless tobacco. She reports that she does not drink alcohol and does not use drugs.    Tobacco:  She smoked tobacco in the past but quit greater than 12 months ago.  Social History    Tobacco Use      Smoking status: Former        Years: 10         Types: Cigarettes        Quit date: 1974        Years since quittin.1      Smokeless tobacco: Never         CAGE Alcohol Screen:   CAGE screening score of 0 on 2024, showing low risk of alcohol abuse.      Patient Care Team:  Taylor Dupont DO as PCP - General (Family Medicine)  Donte Carlos MD as Consulting Physician (NEUROSURGERY)  Marine Rain MD (Anesthesiology)  Celine Harper APRN (Nurse Practitioner)  Jimena Howard APRN (Nurse Practitioner)  Paulette Sprague PA-C (Physician Assistant)    Review of Systems  GENERAL: feels well otherwise  SKIN: denies any unusual skin lesions  EYES: denies blurred vision or double vision  HEENT: denies nasal congestion, sinus pain or ST  LUNGS: denies shortness of breath with exertion  CARDIOVASCULAR: denies chest pain on exertion  GI: denies abdominal pain, denies heartburn  : denies dysuria, vaginal discharge or itching, no complaint of urinary incontinence   MUSCULOSKELETAL: denies back pain  NEURO: denies headaches  PSYCHE: denies depression or anxiety  HEMATOLOGIC: denies hx of anemia  ENDOCRINE: denies thyroid history  ALL/ASTHMA: denies hx of allergy or asthma    Objective:   Physical Exam  General Appearance:  Alert, cooperative, no distress, appears stated age   Head:  Normocephalic, without obvious abnormality, atraumatic   Eyes:  PERRL, conjunctiva/corneas clear, EOM's intact both eyes   Ears:  Normal TM's and external ear canals, both ears   Nose: Nares normal, septum midline,mucosa normal, no drainage or sinus tenderness   Throat: Lips, mucosa, and tongue normal; teeth and gums normal   Neck: Supple, symmetrical, trachea midline, no adenopathy;  thyroid: not enlarged, symmetric, no tenderness/mass/nodules; no carotid bruit or JVD   Back:   Symmetric, no curvature, ROM normal, no CVA tenderness   Lungs:   Clear to auscultation bilaterally, respirations unlabored   Heart:  Regular rate and rhythm, S1 and S2 normal, no murmur, rub, or  gallop       Pelvic: Deferred   Extremities: Extremities normal, atraumatic, no cyanosis or edema   Pulses: 2+ and symmetric   Skin: Skin color, texture, turgor normal, no rashes or lesions   Lymph nodes: Cervical, supraclavicular, and axillary nodes normal   Neurologic: Normal       /66   Pulse 84   Wt 181 lb 12.8 oz (82.5 kg)   SpO2 97%   BMI 35.51 kg/m²  Estimated body mass index is 35.51 kg/m² as calculated from the following:    Height as of 4/24/23: 5' (1.524 m).    Weight as of this encounter: 181 lb 12.8 oz (82.5 kg).    Medicare Hearing Assessment:   Hearing Screening    Screening Method: Whisper Test  Whisper Test Result: Pass                 Assessment & Plan:   Alicia Barragan is a 78 year old female who presents for a Medicare Assessment.     1. Encounter for annual health examination (Primary)  2. Type 2 diabetes mellitus without complication, without long-term current use of insulin (HCC)  3. Hypothyroidism, unspecified type  4. Primary hypertension  Other orders  -     High Dose Fluzone 65 yr and older PFS [61759]  The patient indicates understanding of these issues and agrees to the plan.  Reinforced healthy diet, lifestyle, and exercise.  1. Encounter for annual health examination (Primary)- Reviewed age-appropriate preventive health and safety recommendations with patient. Reviewed lab results. Encouraged regular exercise and healthy eating.   2. Type 2 diabetes mellitus without complication, without long-term current use of insulin (HCC)- controlled with diet, continue.   3. Hypothyroidism, unspecified type- TSH in range. Continue med.   4. Primary hypertension- controlled on med. Continue.   Other orders  -     High Dose Fluzone 65 yr and older PFS [51943]    Return in 6 months (on 8/16/2024).     Taylor Dupont DO, 2/16/2024     Supplementary Documentation:   General Health:  In the past six months, have you lost more than 10 pounds without trying?: 2 - No  Has your appetite been  poor?: No  Type of Diet: Balanced  How does the patient maintain a good energy level?: Other  How would you describe your daily physical activity?: None  How would you describe your current health state?: Good  On a scale of 0 to 10, with 0 being no pain and 10 being severe pain, what is your pain level?: 8 - (Severe)  In the past six months, have you experienced urine leakage?: 0-No  At any time do you feel concerned for the safety/well-being of yourself and/or your children, in your home or elsewhere?: No  Have you had any immunizations at another office such as Influenza, Hepatitis B, Tetanus, or Pneumococcal?: No         Alicia Barragan's SCREENING SCHEDULE   Tests on this list are recommended by your physician but may not be covered, or covered at this frequency, by your insurer.   Please check with your insurance carrier before scheduling to verify coverage.   PREVENTATIVE SERVICES FREQUENCY &  COVERAGE DETAILS LAST COMPLETION DATE   Diabetes Screening    Fasting Blood Sugar /  Glucose    One screening every 12 months if never tested or if previously tested but not diagnosed with pre-diabetes   One screening every 6 months if diagnosed with pre-diabetes Lab Results   Component Value Date     (H) 02/13/2024        Cardiovascular Disease Screening    Lipid Panel  Cholesterol  Lipoprotein (HDL)  Triglycerides Covered every 5 years for all Medicare beneficiaries without apparent signs or symptoms of cardiovascular disease Lab Results   Component Value Date    CHOLEST 155 02/13/2024    HDL 48 02/13/2024    LDL 72 02/13/2024    TRIG 210 (H) 02/13/2024         Electrocardiogram (EKG)   Covered if needed at Welcome to Medicare, and non-screening if indicated for medical reasons 04/08/2023      Ultrasound Screening for Abdominal Aortic Aneurysm (AAA) Covered once in a lifetime for one of the following risk factors    Men who are 65-75 years old and have ever smoked    Anyone with a family history -      Colorectal Cancer Screening  Covered for ages 50-85; only need ONE of the following:    Colonoscopy   Covered every 10 years    Covered every 2 years if patient is at high risk or previous colonoscopy was abnormal 06/03/2016    Health Maintenance   Topic Date Due    Colorectal Cancer Screening  06/03/2026       Flexible Sigmoidoscopy   Covered every 4 years -    Fecal Occult Blood Test Covered annually -   Bone Density Screening    Bone density screening    Covered every 2 years after age 65 if diagnosed with risk of osteoporosis or estrogen deficiency.    Covered yearly for long-term glucocorticoid medication use (Steroids) Last Dexa Scan:    XR DEXA BONE DENSITOMETRY (CPT=77080) 05/20/2017      No recommendations at this time   Pap and Pelvic    Pap   Covered every 2 years for women at normal risk; Annually if at high risk -  No recommendations at this time    Chlamydia Annually if high risk -  No recommendations at this time   Screening Mammogram    Mammogram     Recommend annually for all female patients aged 40 and older    One baseline mammogram covered for patients aged 35-39 -    No recommendations at this time    Immunizations    Influenza Covered once per flu season  Please get every year 02/16/2024  No recommendations at this time    Pneumococcal Each vaccine (Jhxnixd22 & Mdydwbvka51) covered once after 65 Prevnar 13: 05/17/2019    Rdfzwodxv94: 10/22/2021     No recommendations at this time    Hepatitis B One screening covered for patients with certain risk factors   -  No recommendations at this time    Tetanus Toxoid Not covered by Medicare Part B unless medically necessary (cut with metal); may be covered with your pharmacy prescription benefits -    Tetanus, Diptheria and Pertusis TD and TDaP Not covered by Medicare Part B -  No recommendations at this time    Zoster Not covered by Medicare Part B; may be covered with your pharmacy  prescription benefits -  No recommendations at this time      Diabetes      Hemoglobin A1C Annually; if last result is elevated, may be asked to retest more frequently.    Medicare covers every 3 months Lab Results   Component Value Date     (H) 02/13/2024    A1C 7.7 (H) 02/13/2024       No recommendations at this time    Creat/alb ratio Annually Lab Results   Component Value Date    MICROALBCREA  02/13/2024      Comment:      Unable to calculate due to Urine Microalbumin <0.5 mg/dL         LDL Annually Lab Results   Component Value Date    LDL 72 02/13/2024       Dilated Eye Exam Annually Last Diabetic Eye Exam:  Last Dilated Eye Exam: 09/19/23  Eye Exam shows Diabetic Retinopathy?: No       Annual Monitoring of Persistent Medications (ACE/ARB, digoxin diuretics, anticonvulsants)    Potassium Annually Lab Results   Component Value Date    K 3.5 02/13/2024         Creatinine   Annually Lab Results   Component Value Date    CREATSERUM 0.91 02/13/2024         BUN Annually Lab Results   Component Value Date    BUN 20 02/13/2024       Drug Serum Conc Annually No results found for: \"DIGOXIN\", \"DIG\", \"VALP\"                Home SSKI Counseling:  I discussed with the patient the risks of SSKI including but not limited to thyroid abnormalities, metallic taste, GI upset, fever, headache, acne, arthralgias, paraesthesias, lymphadenopathy, easy bleeding, arrhythmias, and allergic reaction.

## 2024-02-19 RX ORDER — DILTIAZEM HYDROCHLORIDE 240 MG/1
240 CAPSULE, COATED, EXTENDED RELEASE ORAL DAILY
Qty: 90 CAPSULE | Refills: 3 | Status: SHIPPED | OUTPATIENT
Start: 2024-02-19

## 2024-02-19 NOTE — TELEPHONE ENCOUNTER
Requested Prescriptions     Pending Prescriptions Disp Refills    DILTIAZEM  MG Oral Capsule SR 24 Hr [Pharmacy Med Name: DilTIAZem CD CAP 240MG/24HR] 90 capsule 3     Sig: TAKE 1 CAPSULE BY MOUTH DAILY       LOV:2/16/24 AMS    LAST CPE:2/16/24 AMS    Last Labs:2/13/24 A1c,tsh,cbc,cmp,lipid    Last Refill:  Medication Quantity Refills Start End   dilTIAZem  MG Oral Capsule SR 24 Hr 90 capsule 1 6/27/2023 --   Sig:   TAKE 1 CAPSULE BY MOUTH  DAILY     Route:   (none)     Note to Pharmacy:   Requesting 1 year supply     Order #:   [id number]

## 2024-03-22 ENCOUNTER — TELEPHONE (OUTPATIENT)
Dept: INTERNAL MEDICINE CLINIC | Facility: CLINIC | Age: 79
End: 2024-03-22

## 2024-03-22 NOTE — TELEPHONE ENCOUNTER
Pt has refills at [facility] but was told they currently don't have any in stock. Pt is requesting that we sent it to [facility] for now.   dilTIAZem  MG Oral Capsule SR 24 Hr 90 capsule 3 2/19/2024 --   Sig:   Take 1 capsule (240 mg total) by mouth daily.     Route:   Oral     Note to Pharmacy:   Please send a replace/new response with 90-Day Supply if appropriate to maximize member benefit. Requesting 1 year supply.

## 2024-03-25 RX ORDER — DILTIAZEM HYDROCHLORIDE 240 MG/1
240 CAPSULE, COATED, EXTENDED RELEASE ORAL DAILY
Qty: 90 CAPSULE | Refills: 3 | Status: SHIPPED | OUTPATIENT
Start: 2024-03-25

## 2024-03-25 NOTE — TELEPHONE ENCOUNTER
dilTIAZem  MG Oral Capsule SR 24 Hr     LOV:2/16/24 AMS    LPE:2/16/24 AMS    LL:2/13/24 micro,lipid,cmp,cbc,tsh,a1c    LR:  Medication Quantity Refills Start End   dilTIAZem  MG Oral Capsule SR 24 Hr 90 capsule 3 2/19/2024 --   Sig:   Take 1 capsule (240 mg total) by mouth daily.     Route:   Oral     Note to Pharmacy:   Please send a replace/new response with 90-Day Supply if appropriate to maximize member benefit. Requesting 1 year supply.     Order #:   [id number]

## 2024-04-08 RX ORDER — LEVOTHYROXINE SODIUM 25 MCG
25 TABLET ORAL
Qty: 90 TABLET | Refills: 3 | Status: SHIPPED | OUTPATIENT
Start: 2024-04-08

## 2024-04-08 NOTE — TELEPHONE ENCOUNTER
Requested Prescriptions     Pending Prescriptions Disp Refills    SYNTHROID 25 MCG Oral Tab [Pharmacy Med Name: Synthroid 25 Mcg Tab Abbv] 90 tablet 0     Sig: Take 1 tablet (25 mcg total) by mouth before breakfast.       LOV:2/16/24 AMS    LAST CPE:2/16/24 AMS    Last Labs:2/13/24 MICRO,LIPID,CMP,CBC,TSH    Last Refill:  Medication Quantity Refills Start End   SYNTHROID 25 MCG Oral Tab 90 tablet 0 1/8/2024 --   Sig:   Take 1 tablet (25 mcg total) by mouth before breakfast.     Route:   Oral     YUNIEL:   Yes     Order #:   [id number]

## 2024-05-22 RX ORDER — LOSARTAN POTASSIUM 50 MG/1
50 TABLET ORAL DAILY
Qty: 90 TABLET | Refills: 3 | Status: SHIPPED | OUTPATIENT
Start: 2024-05-22

## 2024-05-22 NOTE — TELEPHONE ENCOUNTER
REFILL PASSED PER [facility] PROTOCOLS    Requested Prescriptions   Pending Prescriptions Disp Refills    LOSARTAN 50 MG Oral Tab [Pharmacy Med Name: Losartan Potassium 50 MG Oral Tablet] 90 tablet 3     Sig: TAKE 1 TABLET BY MOUTH DAILY       Hypertension Medications Protocol Passed - 5/20/2024  4:35 AM        Passed - CMP or BMP in past 12 months        Passed - Last BP reading less than 140/90     BP Readings from Last 1 Encounters:   02/16/24 130/66               Passed - In person appointment or virtual visit in the past 12 mos or appointment in next 3 mos     Recent Outpatient Visits              3 months ago Encounter for annual health examination    [facility], [address] [place], [place] [address]Handy Anne,     Office Visit    1 year ago COVID-19 virus infection    [facility], [address] [place], [place] [address]Handy Anne,     Office Visit    1 year ago Annual physical exam    [facility], [address] [place], [place] [address]Handy Anne,     Office Visit    1 year ago Lumbar radiculopathy    [facility], [facility] Joanne Estrada APRN    Office Visit    1 year ago Type 2 diabetes mellitus without complication, without long-term current use of insulin (HCC)    [facility], [address] [place] [address], Taylor Flores,     Office Visit                      Passed - EGFRCR or GFRNAA > 50     GFR Evaluation  EGFRCR: 65 , resulted on 2/13/2024                 Recent Outpatient Visits              3 months ago Encounter for annual health examination    [facility], [address] [place], [place] [address]Handy Anne,     Office Visit    1 year ago COVID-19 virus infection    [facility], [address] [place], [place] [address]Handy Anne,     Office Visit    1 year ago Annual physical exam    [facility], [address] [place], [place] [address]Handy Anne,     Office Visit    1 year ago  Lumbar radiculopathy    [facility], [facility] Joanne Estrada APRN    Office Visit    1 year ago Type 2 diabetes mellitus without complication, without long-term current use of insulin ([facility])    [facility], [address] [place] [address] Taylor Dupont DO    Office Visit

## 2024-05-29 ENCOUNTER — NURSE TRIAGE (OUTPATIENT)
Dept: INTERNAL MEDICINE CLINIC | Facility: CLINIC | Age: 79
End: 2024-05-29

## 2024-05-29 NOTE — TELEPHONE ENCOUNTER
Reason for Disposition   MODERATE pain (e.g., interferes with normal activities) and present > 3 days    Protocols used: Hand and Wrist Pain-A-[place]    Action Requested: Summary for Provider     []  Critical Lab, Recommendations Needed  [] Need Additional Advice  []   FYI    []   Need Orders  [] Need Medications Sent to Pharmacy  []  Other     SUMMARY: Pt called stating she believes she has developed arthritis in her hands. 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 Tylenol not helping with the pain. Denies numbness. Denies injuries. Bilateral hands. Scheduled eval apt with [facility]. Pt verbalizes understanding and is agreeable to plan.     Reason for call: Hand Pain  Onset: Data Unavailable      Future Appointments   Date Time Provider Department Center   5/31/2024 11:00 AM Taylor Dupont DO [facility]

## 2024-05-31 ENCOUNTER — OFFICE VISIT (OUTPATIENT)
Dept: INTERNAL MEDICINE CLINIC | Facility: CLINIC | Age: 79
End: 2024-05-31

## 2024-05-31 VITALS
SYSTOLIC BLOOD PRESSURE: 128 MMHG | BODY MASS INDEX: 35.73 KG/M2 | HEART RATE: 87 BPM | WEIGHT: 182 LBS | DIASTOLIC BLOOD PRESSURE: 68 MMHG | HEIGHT: 60 IN | OXYGEN SATURATION: 95 %

## 2024-05-31 DIAGNOSIS — M79.641 PAIN OF RIGHT HAND: ICD-10-CM

## 2024-05-31 DIAGNOSIS — M65.331 TRIGGER MIDDLE FINGER OF RIGHT HAND: Primary | ICD-10-CM

## 2024-05-31 DIAGNOSIS — M65.341 TRIGGER RING FINGER OF RIGHT HAND: ICD-10-CM

## 2024-05-31 PROCEDURE — 99214 OFFICE O/P EST MOD 30 MIN: CPT | Performed by: FAMILY MEDICINE

## 2024-05-31 NOTE — PROGRESS NOTES
Subjective:   Patient ID: Alicia Barragan is a 78 year old female.    HPI Here with c/o right hand pain she has had for about one month. Slight left hand pain. 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 Uses a mouse and this hurts to use. Also noticed two fingers in her right hand get caught in flexion at times. Has tried resting them.     History/Other:   Review of Systems   Musculoskeletal:  Negative for joint swelling and neck pain.   Skin:  Negative for color change and rash.   Neurological:  Negative for weakness and numbness.     Current Outpatient Medications   Medication Sig Dispense Refill    Elastic Bandages & Supports (FUTURO RIGHT HAND WRIST BRACE) Does not apply Misc Wear daily. Full hand/wrist brace. 1 each 0    losartan 50 MG Oral Tab Take 1 tablet (50 mg total) by mouth daily. 90 tablet 3    SYNTHROID 25 MCG Oral Tab Take 1 tablet (25 mcg total) by mouth before breakfast. 90 tablet 3    dilTIAZem  MG Oral Capsule SR 24 Hr Take 1 capsule (240 mg total) by mouth daily. 90 capsule 3    pregabalin 100 MG Oral Cap Take 1 capsule (100 mg total) by mouth 2 (two) times daily. 180 capsule 1    METHOCARBAMOL 750 MG Oral Tab Take 1 tablet (750 mg total) by mouth 3 (three) times daily. 270 tablet 0    Omega-3 1400 MG Oral Cap Take by mouth.      Naproxen Sodium (ALEVE OR) Take 3 tablets by mouth daily.      vitamin E 400 UNITS Oral Cap Take 1 capsule (400 Units total) by mouth daily.      Cholecalciferol (VITAMIN D) 1000 UNITS Oral Cap Take 1 capsule by mouth daily.      Cyanocobalamin (VITAMIN B 12 OR) Take 1,000 Units by mouth daily.      Multiple Vitamin (MULTI-VITAMIN) Oral Tab Take 1 tablet by mouth daily.      acetaminophen (TYLENOL EXTRA STRENGTH) 500 MG Oral Tab Take 2 tablets by mouth every 6 (six) hours as needed for 10 days. 100 tablet 0    magnesium oxide (MAG-OX) 400 MG Oral Tab Take 1 tablet by mouth 2 (two) times daily. Indications: Low Amount of Magnesium in  the Blood 60 tablet 1    aspirin 81 MG Oral Tab Take 1 tablet (81 mg total) by mouth daily.       Allergies:  Allergies   Allergen Reactions    Vicodin [Hydrocodone-Acetaminophen] NAUSEA AND VOMITING, DIZZINESS and HALLUCINATION    Tramadol NAUSEA AND VOMITING       Objective:   Physical Exam  Vitals reviewed.   Constitutional:       Appearance: Normal appearance. She is well-developed.   HENT:      Head: Normocephalic and atraumatic.   Pulmonary:      Effort: Pulmonary effort is normal.   Musculoskeletal:      Right hand: Tenderness present. Decreased range of motion.   Neurological:      Mental Status: She is alert.   Psychiatric:         Mood and Affect: Mood normal.         Behavior: Behavior normal.         Assessment & Plan:   1. Trigger middle finger of right hand    2. Trigger ring finger of right hand    3. Pain of right hand    Hand brace to use for at least 2 weeks, most of the day and night if tolerable. Relative rest.   Ortho if persists.     No orders of the defined types were placed in this encounter.      Meds This Visit:  Requested Prescriptions     Signed Prescriptions Disp Refills    Elastic Bandages & Supports (FUTURO RIGHT HAND WRIST BRACE) Does not apply Misc 1 each 0     Sig: Wear daily. Full hand/wrist brace.       Imaging & Referrals:  ORTHOPEDIC - INTERNAL

## 2024-06-06 ENCOUNTER — PATIENT MESSAGE (OUTPATIENT)
Dept: INTERNAL MEDICINE CLINIC | Facility: CLINIC | Age: 79
End: 2024-06-06

## 2024-07-29 DIAGNOSIS — G47.00 INSOMNIA, UNSPECIFIED TYPE: ICD-10-CM

## 2024-07-29 NOTE — TELEPHONE ENCOUNTER
Alicia Barragan requesting Medication Refill for:    Medication name and dose (copy and paste from medication list):   Medication Quantity Refills Start End   pregabalin 100 MG Oral Cap 180 capsule 1 1/22/2024 --   Sig:   Take 1 capsule (100 mg total) by mouth 2 (two) times daily.     Route:   Oral     Order #:   [id number]       If medication is not on medication list - transfer patient to RN queue for triage    Preferred Pharmacy:  [id number] - [place], [place] - [address] [phone], [phone]   [facility] [address]   Phone: [phone] Fax: [phone]   Hours: Not open 24 hours       LOV: 5/31/2024   Last Refill date: 1/22/24  Next Scheduled appointment: 8/27/2024

## 2024-08-01 RX ORDER — PREGABALIN 100 MG/1
100 CAPSULE ORAL 2 TIMES DAILY
Qty: 180 CAPSULE | Refills: 1 | OUTPATIENT
Start: 2024-08-01

## 2024-08-01 NOTE — TELEPHONE ENCOUNTER
Please review; protocol failed/No Protocol    Recent Fills: 04/20/2024    Last Rx Written: 01/22/2024    Last Office Visit: 05/31/2024    Requested Prescriptions   Pending Prescriptions Disp Refills    PREGABALIN 100 MG Oral Cap [Pharmacy Med Name: Pregabalin 100 Mg Cap Nova] 180 capsule 0     Sig: Take 1 capsule (100 mg total) by mouth 2 (two) times daily.       Controlled Substance Medication Failed - 7/29/2024  3:57 PM        Failed - This medication is a controlled substance - forward to provider to refill       Neurology Medications Passed - 7/29/2024  3:57 PM        Passed - In person appointment or virtual visit in the past 6 mos or appointment in next 3 mos     Recent Outpatient Visits              2 months ago Trigger middle finger of right hand    [address] Taylor Dupont DO    Office Visit    5 months ago Encounter for annual health examination    [address] Taylor Dupont DO    Office Visit    1 year ago COVID-19 virus infection    [address] Taylor Dupont DO    Office Visit    1 year ago Annual physical exam    [address] Taylor Dupont DO    Office Visit    1 year ago Lumbar radiculopathy    [facility], [facility] Joanne Estrada APRN    Office Visit          Future Appointments         Provider Department Appt Notes    In 3 weeks Taylor Dupont DO [address] 6 mth fup medication check                       Future Appointments         Provider Department Appt Notes    In 3 weeks Taylor Dupont DO [address] 6 mth fup medication check          Recent Outpatient Visits              2 months ago Trigger middle finger of right hand    [address]  Taylor Dupont,     Office Visit    5 months ago Encounter for annual health examination    [facility], [address] [place] [address], [place]    Office Visit    1 year ago COVID-19 virus infection    [facility], [address] [place] [address], Taylor Flores DO    Office Visit    1 year ago Annual physical exam    [facility], [address] [place] [address], [place] DO    Office Visit    1 year ago Lumbar radiculopathy    [facility], [facility] Joanne Estrada APRN    Office Visit

## 2024-08-02 RX ORDER — PREGABALIN 100 MG/1
100 CAPSULE ORAL 2 TIMES DAILY
Qty: 180 CAPSULE | Refills: 1 | Status: SHIPPED | OUTPATIENT
Start: 2024-08-02

## 2024-08-27 ENCOUNTER — OFFICE VISIT (OUTPATIENT)
Dept: INTERNAL MEDICINE CLINIC | Facility: CLINIC | Age: 79
End: 2024-08-27
Payer: COMMERCIAL

## 2024-08-27 VITALS
SYSTOLIC BLOOD PRESSURE: 134 MMHG | BODY MASS INDEX: 35.14 KG/M2 | DIASTOLIC BLOOD PRESSURE: 68 MMHG | HEIGHT: 60 IN | HEART RATE: 64 BPM | WEIGHT: 179 LBS | OXYGEN SATURATION: 97 %

## 2024-08-27 DIAGNOSIS — E11.9 TYPE 2 DIABETES MELLITUS WITHOUT COMPLICATION, WITHOUT LONG-TERM CURRENT USE OF INSULIN (HCC): Primary | ICD-10-CM

## 2024-08-27 DIAGNOSIS — M65.341 TRIGGER RING FINGER OF RIGHT HAND: ICD-10-CM

## 2024-08-27 DIAGNOSIS — M65.331 TRIGGER MIDDLE FINGER OF RIGHT HAND: ICD-10-CM

## 2024-08-27 DIAGNOSIS — I10 PRIMARY HYPERTENSION: ICD-10-CM

## 2024-08-27 LAB — HEMOGLOBIN A1C: 7.3 % (ref 4.3–5.6)

## 2024-08-27 PROCEDURE — 83036 HEMOGLOBIN GLYCOSYLATED A1C: CPT | Performed by: FAMILY MEDICINE

## 2024-08-27 PROCEDURE — 99214 OFFICE O/P EST MOD 30 MIN: CPT | Performed by: FAMILY MEDICINE

## 2024-09-23 ENCOUNTER — TELEPHONE (OUTPATIENT)
Dept: INTERNAL MEDICINE CLINIC | Facility: CLINIC | Age: 79
End: 2024-09-23

## 2024-10-11 NOTE — TELEPHONE ENCOUNTER
#3 attempt Left Voicemail to schedule Annual Wellness visit.   Letter mailed to home address on file and routing to leadership,

## 2024-10-24 ENCOUNTER — NURSE TRIAGE (OUTPATIENT)
Dept: INTERNAL MEDICINE CLINIC | Facility: CLINIC | Age: 79
End: 2024-10-24

## 2024-10-24 NOTE — TELEPHONE ENCOUNTER
Called pt. No answer. Left message notifying her she needs to be evaluated before MRI is ordered. Office number provided for call back with any questions.

## 2024-10-24 NOTE — TELEPHONE ENCOUNTER
Action Requested: Summary for Provider     []  Critical Lab, Recommendations Needed  [x] Need Additional Advice  []   FYI    []   Need Orders  [] Need Medications Sent to Pharmacy  []  Other     SUMMARY: pt has appt with BD 10/28/24. Pt inquiring if could just get MRI to complete prior as PCP is familiar with pt 's back history?    Reason for call: back pain  Onset: 1 month    Chronic back pain since 2001, worsening pain in last month. Also, left leg pain, left arm pain. Right hand pain  Takes lyrica, lidocaine patches, not helping.OTC meds don't help.  Pain level 10/10  Constant pain depending on activity   No weakness, No numbness, no problems with bowel/bladder control   No fever, No abdomen pain, No burning with urination, No blood in urine   No availability with PCP rest of week.   Offered and scheduled with APN:  Future Appointments   Date Time Provider Department Center   10/28/2024  2:20 PM Keri Gan APRN [facility]     No further questions or concerns. Pt verbalized understanding and agreed with POC.      Reason for Disposition   Patient wants to be seen    Protocols used: Back Pain-A-[place]

## 2024-10-28 ENCOUNTER — OFFICE VISIT (OUTPATIENT)
Dept: INTERNAL MEDICINE CLINIC | Facility: CLINIC | Age: 79
End: 2024-10-28
Payer: COMMERCIAL

## 2024-10-28 VITALS
DIASTOLIC BLOOD PRESSURE: 82 MMHG | BODY MASS INDEX: 35 KG/M2 | SYSTOLIC BLOOD PRESSURE: 164 MMHG | HEIGHT: 60 IN | OXYGEN SATURATION: 97 % | HEART RATE: 70 BPM

## 2024-10-28 DIAGNOSIS — M54.50 CHRONIC BILATERAL LOW BACK PAIN WITHOUT SCIATICA: ICD-10-CM

## 2024-10-28 DIAGNOSIS — G89.29 CHRONIC BILATERAL LOW BACK PAIN WITHOUT SCIATICA: ICD-10-CM

## 2024-10-28 DIAGNOSIS — M79.18 PIRIFORMIS MUSCLE PAIN: Primary | ICD-10-CM

## 2024-10-28 DIAGNOSIS — M48.061 DEGENERATIVE LUMBAR SPINAL STENOSIS: ICD-10-CM

## 2024-10-28 PROCEDURE — 99214 OFFICE O/P EST MOD 30 MIN: CPT

## 2024-10-28 NOTE — PROGRESS NOTES
Alicia Barragan is a 79 year old female.   Chief Complaint   Patient presents with    Other     [address]  Pt here to discuss problems with both legs      HPI:    Patient here today to follow up on lower back pain and bilateral leg pain. Patient reports over last 3-4 weeks has new pain in left buttocks that does not radiate. Patient triggered with positional changes. No known injuries or trauma reported. Patient denies numbness/tingling. No changes with urination or bowel movements. Patient reports sedentary lifestyle and sits often for work. Not interested in surgical interventions- declined PT. Uses walker daily for gait stability. Last imaging of lumbar spine completed in 2022 which revealed extensive DDD with bilateral neural foraminal narrowing  described as moderate.     Allergies:  Allergies[1]   Current Meds:  Current Outpatient Medications   Medication Sig Dispense Refill    pregabalin 100 MG Oral Cap Take 1 capsule (100 mg total) by mouth 2 (two) times daily. 180 capsule 1    losartan 50 MG Oral Tab Take 1 tablet (50 mg total) by mouth daily. 90 tablet 3    SYNTHROID 25 MCG Oral Tab Take 1 tablet (25 mcg total) by mouth before breakfast. 90 tablet 3    dilTIAZem  MG Oral Capsule SR 24 Hr Take 1 capsule (240 mg total) by mouth daily. 90 capsule 3    METHOCARBAMOL 750 MG Oral Tab Take 1 tablet (750 mg total) by mouth 3 (three) times daily. 270 tablet 0    Omega-3 1400 MG Oral Cap Take by mouth.      Naproxen Sodium (ALEVE OR) Take 3 tablets by mouth daily.      vitamin E 400 UNITS Oral Cap Take 1 capsule (400 Units total) by mouth daily.      Cholecalciferol (VITAMIN D) 1000 UNITS Oral Cap Take 1 capsule by mouth daily.      Cyanocobalamin (VITAMIN B 12 OR) Take 1,000 Units by mouth daily.      Multiple Vitamin (MULTI-VITAMIN) Oral Tab Take 1 tablet by mouth daily.      magnesium oxide (MAG-OX) 400 MG Oral Tab Take 1 tablet by mouth 2 (two) times daily. Indications: Low Amount of Magnesium in the Blood  COVID Screening Specimen Collected    POSTIVE for the following symptoms:  None    NEGATIVE for the following symptoms:  Fever  Cough  Shortness of breath  Difficulty breathing  GI symptoms such as diarrhea or nausea  Loss of taste  Loss of smell    Patient was given:  1.Instructions for Patients Awaiting COVID-19 Test Results  2. CDC: What to do if you are sick with coronavirus disease 2019 (COVID-19)       60 tablet 1    aspirin 81 MG Oral Tab Take 1 tablet (81 mg total) by mouth daily.      Elastic Bandages & Supports (FUTURO RIGHT HAND WRIST BRACE) Does not apply Misc Wear daily. Full hand/wrist brace. 1 each 0    acetaminophen (TYLENOL EXTRA STRENGTH) 500 MG Oral Tab Take 2 tablets by mouth every 6 (six) hours as needed for 10 days. 100 tablet 0        PMH:     Past Medical History:    Anal pain    Anemia    Post-op     Back problem    Closed fracture of lateral malleolus    Log Date: 09/27/2011     Congenital spondylolisthesis    Degeneration of lumbar or lumbosacral intervertebral disc    Log Date: 03/05/2012     Degenerative lumbar spinal stenosis    Diabetes (HCC)    Disturbance of skin sensation    Log Date: 06/26/2012     Diverticulosis    DM type 2 (diabetes mellitus, type 2) (HCC)    Esophageal reflux    External hemorrhoids with other complication    with bleeding    Gastrointestinal bleeding    Hypertension    Hypertension    Hypothyroidism    Hypothyroidism    IBS (irritable bowel syndrome)    Internal hemorrhoids with other complication    prolapsed    Migraine headache    Muscle weakness    Neuralgia, neuritis, and radiculitis, unspecified    Obese    Other ill-defined conditions(799.89)    thyroid     Postoperative seroma    Rectocele    S/P lumbar laminectomy    Spinal stenosis, lumbar    Spondylosis of unspecified site without mention of myelopathy    Thoracic or lumbosacral neuritis or radiculitis, unspecified    Log Date: 06/26/2012     Type II or unspecified type diabetes mellitus without mention of complication, not stated as uncontrolled    Unspecified closed fracture of ankle    Log Date: 10/10/2011     Unspecified disorder of thyroid    Valvular disease    MVP    Visual impairment    glasses       ROS:   Review of Systems   See HPI      PHYSICAL EXAM:    BP (!) 164/82   Pulse 70   Ht 5' (1.524 m)   SpO2 97%   BMI 34.96 kg/m²   Physical Exam  Constitutional:       Appearance: Normal  appearance.   Pulmonary:      Effort: Pulmonary effort is normal.   Skin:     General: Skin is warm and dry.   Neurological:      Mental Status: She is alert. Mental status is at baseline.      Sensory: Sensation is intact.      Motor: Motor function is intact.      Coordination: Coordination is intact.        ASSESSMENT/ PLAN:   1. Piriformis muscle pain  Discussed PT, warm compress, rest  - Physiatry Referral - In Network    2. Chronic bilateral low back pain without sciatica  - Physiatry Referral - In Network    3. Degenerative lumbar spinal stenosis        Health Maintenance Due   Topic Date Due    Diabetes Care Foot Exam  Never done    COVID-19 Vaccine (3 - 2023-24 season) 09/01/2024    Influenza Vaccine (1) 10/01/2024           Pt indicates understanding and agrees to the plan.     No follow-ups on file.    TRISTA Rose          [1]   Allergies  Allergen Reactions    Vicodin [Hydrocodone-Acetaminophen] NAUSEA AND VOMITING, DIZZINESS and HALLUCINATION    Tramadol NAUSEA AND VOMITING

## 2024-11-04 ENCOUNTER — OFFICE VISIT (OUTPATIENT)
Dept: ORTHOPEDICS CLINIC | Facility: CLINIC | Age: 79
End: 2024-11-04
Payer: COMMERCIAL

## 2024-11-04 DIAGNOSIS — M65.332 TRIGGER MIDDLE FINGER OF LEFT HAND: ICD-10-CM

## 2024-11-04 DIAGNOSIS — M65.331 TRIGGER MIDDLE FINGER OF RIGHT HAND: Primary | ICD-10-CM

## 2024-11-04 DIAGNOSIS — M65.341 TRIGGER RING FINGER OF RIGHT HAND: ICD-10-CM

## 2024-11-04 RX ORDER — BETAMETHASONE SODIUM PHOSPHATE AND BETAMETHASONE ACETATE 3; 3 MG/ML; MG/ML
6 INJECTION, SUSPENSION INTRA-ARTICULAR; INTRALESIONAL; INTRAMUSCULAR; SOFT TISSUE ONCE
Status: COMPLETED | OUTPATIENT
Start: 2024-11-04 | End: 2024-11-04

## 2024-11-04 RX ADMIN — BETAMETHASONE SODIUM PHOSPHATE AND BETAMETHASONE ACETATE 6 MG: 3; 3 INJECTION, SUSPENSION INTRA-ARTICULAR; INTRALESIONAL; INTRAMUSCULAR; SOFT TISSUE at 14:13:00

## 2024-11-04 NOTE — PROGRESS NOTES
Clinic Note     Allergies[1]    CC: right Middle and ring finger triggering, left middle finger triggering    HPI: 79 year old RHD female presents with triggering of the above. Ongoing for several months. Right middle and ring finger frequently lock up and get stuck, and she has to pry them open. Left middle finger symptoms are much milder and it does not lock up as often.    Previous treatments: none    Past Medical History:    Anal pain    Anemia    Post-op     Back problem    Closed fracture of lateral malleolus    Log Date: 09/27/2011     Congenital spondylolisthesis    Degeneration of lumbar or lumbosacral intervertebral disc    Log Date: 03/05/2012     Degenerative lumbar spinal stenosis    Diabetes (HCC)    Disturbance of skin sensation    Log Date: 06/26/2012     Diverticulosis    DM type 2 (diabetes mellitus, type 2) (HCC)    Esophageal reflux    External hemorrhoids with other complication    with bleeding    Gastrointestinal bleeding    Hypertension    Hypertension    Hypothyroidism    Hypothyroidism    IBS (irritable bowel syndrome)    Internal hemorrhoids with other complication    prolapsed    Migraine headache    Muscle weakness    Neuralgia, neuritis, and radiculitis, unspecified    Obese    Other ill-defined conditions(799.89)    thyroid     Postoperative seroma    Rectocele    S/P lumbar laminectomy    Spinal stenosis, lumbar    Spondylosis of unspecified site without mention of myelopathy    Thoracic or lumbosacral neuritis or radiculitis, unspecified    Log Date: 06/26/2012     Type II or unspecified type diabetes mellitus without mention of complication, not stated as uncontrolled    Unspecified closed fracture of ankle    Log Date: 10/10/2011     Unspecified disorder of thyroid    Valvular disease    MVP    Visual impairment    glasses     Past Surgical History:   Procedure Laterality Date    Colonoscopy  2010    D & c  1975    Epidurography, radiological s & i  3/22/2013    Procedure:  CAUDAL;  Surgeon: Larry Lowry MD;  Location: [facility] [place] [facility]    Fluor gid & loclzj ndl/cath spi dx/ther njx  3/1/2013    Procedure: CAUDAL;  Surgeon: Paul Sanchez MD;  Location: [facility]    Fluor gid & loclzj ndl/cath spi dx/ther njx  4/17/2013    Procedure: CAUDAL;  Surgeon: Paul Sanchez MD;  Location: [facility]    Fluoroscopic guidance needle placement Right 7/14/2014    Procedure: LUMBAR FACET INJECTION OR MEDIAL BRANCH NERVE BLOCK;  Surgeon: Paul Sanchez MD;  Location: [facility]    Hemorrhoidectomy  1/18/17    transanal excision of anal canal mass    Hernia surgery  2011    hernia repair    Inject nerv blck,othr periph nerv Right 6/23/2014    Procedure: LUMBAR FACET INJECTION OR MEDIAL BRANCH NERVE BLOCK;  Surgeon: Paul Sanchez MD;  Location: [facility]    Inject nerv blck,othr periph nerv Right 6/23/2014    Procedure: LUMBAR FACET INJECTION OR MEDIAL BRANCH NERVE BLOCK;  Surgeon: Paul Sanchez MD;  Location: [facility] [place] [facility]    Inject nerv blck,othr periph nerv Right 6/23/2014    Procedure: LUMBAR FACET INJECTION OR MEDIAL BRANCH NERVE BLOCK;  Surgeon: Paul Sanchez MD;  Location: [facility] [place] [facility]    Inject nerv blck,othr periph nerv Right 6/23/2014    Procedure: LUMBAR FACET INJECTION OR MEDIAL BRANCH NERVE BLOCK;  Surgeon: Paul Sanchez MD;  Location: [facility]    Inject nerv blck,othr periph nerv Right 7/14/2014    Procedure: LUMBAR FACET INJECTION OR MEDIAL BRANCH NERVE BLOCK;  Surgeon: Paul Sanchez MD;  Location: [facility] [place] [facility]    Inject nerv blck,othr periph nerv Right 7/14/2014    Procedure: LUMBAR FACET INJECTION OR MEDIAL BRANCH NERVE BLOCK;  Surgeon: Paul Sanchez MD;  Location: [facility] [place] [facility]    Inject nerv blck,othr periph nerv Right 7/14/2014    Procedure: LUMBAR FACET INJECTION OR MEDIAL BRANCH  NERVE BLOCK;  Surgeon: Paul Sanhcez MD;  Location: [facility] [place] [facility]    Inject nerv capo rosenbergh nerv Right 7/14/2014    Procedure: LUMBAR FACET INJECTION OR MEDIAL BRANCH NERVE BLOCK;  Surgeon: Paul Sanchez MD;  Location: [facility] [place] [facility]    Injection proc for sacroiliac joint arthrography &/or anesthetic/steroid Right 5/16/2014    Procedure: SI JOINT INJECTION;  Surgeon: Paul Sanchez MD;  Location: [facility] [place] [facility]    Injection proc for sacroiliac joint arthrography &/or anesthetic/steroid N/A 6/9/2014    Procedure: SI JOINT INJECTION;  Surgeon: Paul Sanchez MD;  Location: [facility]    Injection, w/wo contrast, dx/therapeutic substance, epidural/subarachnoid; lumbar/sacral  3/1/2013    Procedure: CAUDAL;  Surgeon: Paul Sanchez MD;  Location: [facility]    Injection, w/wo contrast, dx/therapeutic substance, epidural/subarachnoid; lumbar/sacral  3/22/2013    Procedure: CAUDAL;  Surgeon: Larry Lowry MD;  Location: [facility]    Injection, w/wo contrast, dx/therapeutic substance, epidural/subarachnoid; lumbar/sacral  4/17/2013    Procedure: CAUDAL;  Surgeon: Paul Sanchez MD;  Location: [facility]    Ligation of hemorrhoid(s)      x4    M-sedaj by  phys perfrmg svc 5+ yr  3/1/2013    Procedure: CAUDAL;  Surgeon: Paul Sanchez MD;  Location: [facility] [place] [facility]    M-sedaj by  phys perfrmg svc 5+ yr  3/22/2013    Procedure: CAUDAL;  Surgeon: Larry Lowry MD;  Location: [facility] [place] [facility]    M-sedaj by  phys perfrmg svc 5+ yr  4/17/2013    Procedure: CAUDAL;  Surgeon: Paul Sanchez MD;  Location: [facility] [place] [facility]    M-sedaj by  phys perfrmg svc 5+ yr Right 5/16/2014    Procedure: SI JOINT INJECTION;  Surgeon: Paul Sanchez MD;  Location: [facility] [place] [facility]    M-sedaj by  phys perfrmg svc 5+ yr N/A 6/9/2014     Procedure: SI JOINT INJECTION;  Surgeon: Paul Sanchez MD;  [place]: [facility] [place] [facility]    M-sedaj by  phys perfrmg svc 5+ yr Right 6/23/2014    Procedure: LUMBAR FACET INJECTION OR MEDIAL BRANCH NERVE BLOCK;  Surgeon: Paul Sanchez MD;  Location: [facility]    M-sedaj by  phys perfrmg svc 5+ yr Right 7/14/2014    Procedure: LUMBAR FACET INJECTION OR MEDIAL BRANCH NERVE BLOCK;  Surgeon: Paul Sanchez MD;  Location: [facility]    Grady localization wire 1 site right (cpt=19281)      Orthopedic surg (pbp)      lumbar disc x2 2010/2012    Other  2011    orif left ankle    Other      AP repair    Other surgical history      bunionectomy    Other surgical history  1993    breast biopsy    Other surgical history      back surgery    Other surgical history  2011    open surgical reduction fracture     Current Outpatient Medications   Medication Sig Dispense Refill    pregabalin 100 MG Oral Cap Take 1 capsule (100 mg total) by mouth 2 (two) times daily. 180 capsule 1    Elastic Bandages & Supports (FUTURO RIGHT HAND WRIST BRACE) Does not apply Misc Wear daily. Full hand/wrist brace. 1 each 0    losartan 50 MG Oral Tab Take 1 tablet (50 mg total) by mouth daily. 90 tablet 3    SYNTHROID 25 MCG Oral Tab Take 1 tablet (25 mcg total) by mouth before breakfast. 90 tablet 3    dilTIAZem  MG Oral Capsule SR 24 Hr Take 1 capsule (240 mg total) by mouth daily. 90 capsule 3    METHOCARBAMOL 750 MG Oral Tab Take 1 tablet (750 mg total) by mouth 3 (three) times daily. 270 tablet 0    Omega-3 1400 MG Oral Cap Take by mouth.      Naproxen Sodium (ALEVE OR) Take 3 tablets by mouth daily.      vitamin E 400 UNITS Oral Cap Take 1 capsule (400 Units total) by mouth daily.      Cholecalciferol (VITAMIN D) 1000 UNITS Oral Cap Take 1 capsule by mouth daily.      Cyanocobalamin (VITAMIN B 12 OR) Take 1,000 Units by mouth daily.      Multiple Vitamin (MULTI-VITAMIN) Oral Tab Take  1 tablet by mouth daily.      acetaminophen (TYLENOL EXTRA STRENGTH) 500 MG Oral Tab Take 2 tablets by mouth every 6 (six) hours as needed for 10 days. 100 tablet 0    magnesium oxide (MAG-OX) 400 MG Oral Tab Take 1 tablet by mouth 2 (two) times daily. Indications: Low Amount of Magnesium in the Blood 60 tablet 1    aspirin 81 MG Oral Tab Take 1 tablet (81 mg total) by mouth daily.       History reviewed. No pertinent family history.  Social History     Occupational History    Not on file   Tobacco Use    Smoking status: Former     Current packs/day: 0.00     Types: Cigarettes     Start date: 1964     Quit date: 1974     Years since quittin.8    Smokeless tobacco: Never    Tobacco comments:     Updated 24   Vaping Use    Vaping status: Never Used   Substance and Sexual Activity    Alcohol use: No     Comment: beer rarely/ once a year    Drug use: No    Sexual activity: Not on file        Review of Systems:  Negative unless stated in HPI.    Physical Exam:    There were no vitals taken for this visit.    Bilateral Upper Extremity:   Inspection: Skin Intact. No skin lesions. No gross deformity.   Palpation: TTP over A1 pulley of right Middle finger and Ring finger, and left middle finger   Motion: Elbow: normal bilateral symmetric ext/flex  Wrist: normal bilateral symmetric ext/flex/sup/pro  Finger: normal in all digits   Special Tests: + active triggering/subtle catch of right middle and ring finger, but not of left middle finger today. No IPJ contracture. Normally sensate digit. Normally perfused digit.       Assessment/Plan:  79 year old female with right Middle finger and Ring finger, left middle finger triggering.    I reviewed the patient's electronic medical record, including the pertinent office notes, medical/surgical history, medications and images. I specifically reviewed the images noted above, independently and discussed my independent interpretation of these images in combination with  the pertinent positive and negative findings in my clinical exam with the patient.    Triggering of right Middle finger and Ring finger -  I discussed with the patient surgical and non-surgical treatment options and their success rate/risks. Using a shared decision-making process, patient elected to proceed with a corticosteroid injection.  CSI #1 was performed today for these digits    2.    Triggering of left middle finger - mild symptoms, patient elected to observe this finger for now.    Follow Up:  6 weeks, okay to cancel appointment if symptoms are 100% resolved    Procedure:  Written consent was obtained.  Skin was prepped with ChloraPrep.  1 mL of 6 mg betamethasone and 1 mL of 1% lidocaine was injected and split equally into the subcutaneous tissue overlying the A1 pulleys of right Middle finger and Ring finger. Patient tolerated the procedure well.  No complications were encountered.  Band-Aid was applied.    Titi Youssef MD   Hand, Wrist, & Elbow Surgery  [email or web address]         [1]   Allergies  Allergen Reactions    Vicodin [Hydrocodone-Acetaminophen] NAUSEA AND VOMITING, DIZZINESS and HALLUCINATION    Tramadol NAUSEA AND VOMITING

## 2024-11-12 ENCOUNTER — PATIENT MESSAGE (OUTPATIENT)
Dept: INTERNAL MEDICINE CLINIC | Facility: CLINIC | Age: 79
End: 2024-11-12

## 2024-11-19 DIAGNOSIS — I10 PRIMARY HYPERTENSION: ICD-10-CM

## 2024-11-19 DIAGNOSIS — Z13.228 SCREENING FOR ENDOCRINE, NUTRITIONAL, METABOLIC AND IMMUNITY DISORDER: ICD-10-CM

## 2024-11-19 DIAGNOSIS — E03.9 HYPOTHYROIDISM, UNSPECIFIED TYPE: ICD-10-CM

## 2024-11-19 DIAGNOSIS — Z00.00 ANNUAL PHYSICAL EXAM: Primary | ICD-10-CM

## 2024-11-19 DIAGNOSIS — Z13.21 SCREENING FOR ENDOCRINE, NUTRITIONAL, METABOLIC AND IMMUNITY DISORDER: ICD-10-CM

## 2024-11-19 DIAGNOSIS — Z13.220 SCREENING FOR LIPOID DISORDERS: ICD-10-CM

## 2024-11-19 DIAGNOSIS — E11.9 TYPE 2 DIABETES MELLITUS WITHOUT COMPLICATION, WITHOUT LONG-TERM CURRENT USE OF INSULIN (HCC): ICD-10-CM

## 2024-11-19 DIAGNOSIS — Z13.0 SCREENING FOR ENDOCRINE, NUTRITIONAL, METABOLIC AND IMMUNITY DISORDER: ICD-10-CM

## 2024-11-19 DIAGNOSIS — Z13.29 SCREENING FOR ENDOCRINE, NUTRITIONAL, METABOLIC AND IMMUNITY DISORDER: ICD-10-CM

## 2024-11-19 NOTE — PROGRESS NOTES
Hospitalist Subjective:   Patient ID: Alicia Barragan is a 79 year old female.    HPI Here for f/u on diabetes and BP. Patient is taking meds as prescribed with no issues.   Continues to have triggering of fingers and is now starting on the left hand.     History/Other:   Review of Systems   Respiratory:  Negative for chest tightness and shortness of breath.    Cardiovascular:  Negative for chest pain and palpitations.   Neurological:  Negative for dizziness and headaches.     Current Outpatient Medications   Medication Sig Dispense Refill    pregabalin 100 MG Oral Cap Take 1 capsule (100 mg total) by mouth 2 (two) times daily. 180 capsule 1    Elastic Bandages & Supports (FUTURO RIGHT HAND WRIST BRACE) Does not apply Misc Wear daily. Full hand/wrist brace. 1 each 0    losartan 50 MG Oral Tab Take 1 tablet (50 mg total) by mouth daily. 90 tablet 3    SYNTHROID 25 MCG Oral Tab Take 1 tablet (25 mcg total) by mouth before breakfast. 90 tablet 3    dilTIAZem  MG Oral Capsule SR 24 Hr Take 1 capsule (240 mg total) by mouth daily. 90 capsule 3    METHOCARBAMOL 750 MG Oral Tab Take 1 tablet (750 mg total) by mouth 3 (three) times daily. 270 tablet 0    Omega-3 1400 MG Oral Cap Take by mouth.      Naproxen Sodium (ALEVE OR) Take 3 tablets by mouth daily.      vitamin E 400 UNITS Oral Cap Take 1 capsule (400 Units total) by mouth daily.      Cholecalciferol (VITAMIN D) 1000 UNITS Oral Cap Take 1 capsule by mouth daily.      Cyanocobalamin (VITAMIN B 12 OR) Take 1,000 Units by mouth daily.      Multiple Vitamin (MULTI-VITAMIN) Oral Tab Take 1 tablet by mouth daily.      acetaminophen (TYLENOL EXTRA STRENGTH) 500 MG Oral Tab Take 2 tablets by mouth every 6 (six) hours as needed for 10 days. 100 tablet 0    magnesium oxide (MAG-OX) 400 MG Oral Tab Take 1 tablet by mouth 2 (two) times daily. Indications: Low Amount of Magnesium in the Blood 60 tablet 1    aspirin 81 MG Oral Tab Take 1 tablet (81 mg total) by mouth daily.        Allergies:  Allergies   Allergen Reactions    Vicodin [Hydrocodone-Acetaminophen] NAUSEA AND VOMITING, DIZZINESS and HALLUCINATION    Tramadol NAUSEA AND VOMITING       Objective:   Physical Exam  Vitals reviewed.   Constitutional:       Appearance: Normal appearance. She is well-developed.   HENT:      Head: Normocephalic and atraumatic.   Cardiovascular:      Rate and Rhythm: Normal rate and regular rhythm.      Heart sounds: Normal heart sounds.   Pulmonary:      Effort: Pulmonary effort is normal.      Breath sounds: Normal breath sounds.   Neurological:      Mental Status: She is alert.   Psychiatric:         Mood and Affect: Mood normal.         Behavior: Behavior normal.         Assessment & Plan:   1. Type 2 diabetes mellitus without complication, without long-term current use of insulin ([facility])    2. Primary hypertension    3. Trigger middle finger of right hand    4. Trigger ring finger of right hand    A1c at goal. Recheck in 6 months.   BP controlled on med. Continue.   3,4. Referral for Ortho.     Orders Placed This Encounter   Procedures    POC Hgb A1C       Meds This Visit:  Requested Prescriptions      No prescriptions requested or ordered in this encounter       Imaging & Referrals:  ORTHOPEDIC - INTERNAL     Hospitalist

## 2024-12-04 NOTE — TELEPHONE ENCOUNTER
Patient called regarding a notice she received in the mail. She does not have it in front of her and forgot what it said. Advised of BP message sent via [employer]. Patient does not check BP at home. Offered to schedule BP check in office. She has ortho appointment on 12/17 and will have them check it. If elevated, advised patient to schedule appointment with us. She voices understanding.

## 2024-12-17 ENCOUNTER — OFFICE VISIT (OUTPATIENT)
Facility: CLINIC | Age: 79
End: 2024-12-17
Payer: COMMERCIAL

## 2024-12-17 VITALS — BODY MASS INDEX: 35.14 KG/M2 | HEIGHT: 60 IN | WEIGHT: 179 LBS

## 2024-12-17 DIAGNOSIS — M65.332 TRIGGER MIDDLE FINGER OF LEFT HAND: ICD-10-CM

## 2024-12-17 DIAGNOSIS — M65.341 TRIGGER RING FINGER OF RIGHT HAND: ICD-10-CM

## 2024-12-17 DIAGNOSIS — M65.331 TRIGGER MIDDLE FINGER OF RIGHT HAND: Primary | ICD-10-CM

## 2024-12-17 PROCEDURE — 99213 OFFICE O/P EST LOW 20 MIN: CPT | Performed by: STUDENT IN AN ORGANIZED HEALTH CARE EDUCATION/TRAINING PROGRAM

## 2024-12-17 PROCEDURE — 20550 NJX 1 TENDON SHEATH/LIGAMENT: CPT | Performed by: STUDENT IN AN ORGANIZED HEALTH CARE EDUCATION/TRAINING PROGRAM

## 2024-12-17 RX ORDER — BETAMETHASONE SODIUM PHOSPHATE AND BETAMETHASONE ACETATE 3; 3 MG/ML; MG/ML
6 INJECTION, SUSPENSION INTRA-ARTICULAR; INTRALESIONAL; INTRAMUSCULAR; SOFT TISSUE ONCE
Status: COMPLETED | OUTPATIENT
Start: 2024-12-17 | End: 2024-12-17

## 2024-12-17 RX ADMIN — BETAMETHASONE SODIUM PHOSPHATE AND BETAMETHASONE ACETATE 6 MG: 3; 3 INJECTION, SUSPENSION INTRA-ARTICULAR; INTRALESIONAL; INTRAMUSCULAR; SOFT TISSUE at 13:51:00

## 2024-12-17 NOTE — PROGRESS NOTES
Clinic Note     Allergies[1]    CC: right Middle and ring finger triggering, left middle finger triggering    HPI:     From prior visit 11/4/24:  79 year old RHD female presents with triggering of the above. Ongoing for several months. Right middle and ring finger frequently lock up and get stuck, and she has to pry them open. Left middle finger symptoms are much milder and it does not lock up as often.    Previous treatments: none    Today's visit 12/17/24:  Prior CSI for right middle and ring fingers worked well. Now interested in left middle finger CSI as symptoms have not improved.    Past Medical History:    Anal pain    Anemia    Post-op     Back problem    Closed fracture of lateral malleolus    Log Date: 09/27/2011     Congenital spondylolisthesis    Degeneration of lumbar or lumbosacral intervertebral disc    Log Date: 03/05/2012     Degenerative lumbar spinal stenosis    Diabetes (HCC)    Disturbance of skin sensation    Log Date: 06/26/2012     Diverticulosis    DM type 2 (diabetes mellitus, type 2) ([facility])    Esophageal reflux    External hemorrhoids with other complication    with bleeding    Gastrointestinal bleeding    Hypertension    Hypertension    Hypothyroidism    Hypothyroidism    IBS (irritable bowel syndrome)    Internal hemorrhoids with other complication    prolapsed    Migraine headache    Muscle weakness    Neuralgia, neuritis, and radiculitis, unspecified    Obese    Other ill-defined conditions(799.89)    thyroid     Postoperative seroma    Rectocele    S/P lumbar laminectomy    Spinal stenosis, lumbar    Spondylosis of unspecified site without mention of myelopathy    Thoracic or lumbosacral neuritis or radiculitis, unspecified    Log Date: 06/26/2012     Type II or unspecified type diabetes mellitus without mention of complication, not stated as uncontrolled    Unspecified closed fracture of ankle    Log Date: 10/10/2011     Unspecified disorder of thyroid    Valvular disease    MVP     Visual impairment    glasses     Past Surgical History:   Procedure Laterality Date    Colonoscopy  2010    D & c  1975    Epidurography, radiological s & i  3/22/2013    Procedure: CAUDAL;  Surgeon: Larry Lowry MD;  Location: [facility]    Fluor gid & loclzj ndl/cath spi dx/ther njx  3/1/2013    Procedure: CAUDAL;  Surgeon: Paul Sanchez MD;  Location: [facility]    Fluor gid & loclzj ndl/cath spi dx/ther njx  4/17/2013    Procedure: CAUDAL;  Surgeon: Paul Sanchez MD;  Location: [facility]    Fluoroscopic guidance needle placement Right 7/14/2014    Procedure: LUMBAR FACET INJECTION OR MEDIAL BRANCH NERVE BLOCK;  Surgeon: Paul Sanchez MD;  Location: [facility]    Hemorrhoidectomy  1/18/17    transanal excision of anal canal mass    Hernia surgery  2011    hernia repair    Inject nerv blck,othr periph nerv Right 6/23/2014    Procedure: LUMBAR FACET INJECTION OR MEDIAL BRANCH NERVE BLOCK;  Surgeon: Paul Sanchez MD;  Location: [facility]    Inject nerv blck,othr periph nerv Right 6/23/2014    Procedure: LUMBAR FACET INJECTION OR MEDIAL BRANCH NERVE BLOCK;  Surgeon: Paul Sanchez MD;  Location: [facility]    Inject nerv blck,othr periph nerv Right 6/23/2014    Procedure: LUMBAR FACET INJECTION OR MEDIAL BRANCH NERVE BLOCK;  Surgeon: Paul Sanchez MD;  Location: [facility]    Inject nerv blck,othr periph nerv Right 6/23/2014    Procedure: LUMBAR FACET INJECTION OR MEDIAL BRANCH NERVE BLOCK;  Surgeon: Paul Sanchez MD;  Location: [facility]    Inject nerv blck,othr periph nerv Right 7/14/2014    Procedure: LUMBAR FACET INJECTION OR MEDIAL BRANCH NERVE BLOCK;  Surgeon: Paul Sanchez MD;  Location: [facility]    Inject nerv blck,othr periph nerv Right 7/14/2014    Procedure: LUMBAR FACET INJECTION OR MEDIAL BRANCH NERVE  BLOCK;  Surgeon: Paul Sanchez MD;  Location: [facility]    Inject nerv blck,othr periph nerv Right 7/14/2014    Procedure: LUMBAR FACET INJECTION OR MEDIAL BRANCH NERVE BLOCK;  Surgeon: Paul Sanchez MD;  Location: [facility]    Inject nerv blck,othr periph nerv Right 7/14/2014    Procedure: LUMBAR FACET INJECTION OR MEDIAL BRANCH NERVE BLOCK;  Surgeon: Paul Sanchez MD;  Location: [facility]    Injection proc for sacroiliac joint arthrography &/or anesthetic/steroid Right 5/16/2014    Procedure: SI JOINT INJECTION;  Surgeon: Paul Sanchez MD;  Location: [facility]    Injection proc for sacroiliac joint arthrography &/or anesthetic/steroid N/A 6/9/2014    Procedure: SI JOINT INJECTION;  Surgeon: Paul Sanchez MD;  Location: [facility]    Injection, w/wo contrast, dx/therapeutic substance, epidural/subarachnoid; lumbar/sacral  3/1/2013    Procedure: CAUDAL;  Surgeon: Paul Sanchez MD;  Location: [facility]    Injection, w/wo contrast, dx/therapeutic substance, epidural/subarachnoid; lumbar/sacral  3/22/2013    Procedure: CAUDAL;  Surgeon: Larry Lowry MD;  Location: [facility]    Injection, w/wo contrast, dx/therapeutic substance, epidural/subarachnoid; lumbar/sacral  4/17/2013    Procedure: CAUDAL;  Surgeon: Paul Sanchez MD;  Location: [facility]    Ligation of hemorrhoid(s)      x4    M-sedaj by  phys perfrmg svc 5+ yr  3/1/2013    Procedure: CAUDAL;  Surgeon: Paul Sanchez MD;  Location: [facility]    M-sedaj by  phys perfrmg svc 5+ yr  3/22/2013    Procedure: CAUDAL;  Surgeon: Larry Lowry MD;  Location: [facility]    M-sedaj by  phys perfrmg svc 5+ yr  4/17/2013    Procedure: CAUDAL;  Surgeon: Paul Sanchez MD;  Location: [facility]    M-sedaj by  phys perfrmg  svc 5+ yr Right 5/16/2014    Procedure: SI JOINT INJECTION;  Surgeon: Paul Sanchez MD;  Location: [facility]    M-sedaj by  phys perfrmg svc 5+ yr N/A 6/9/2014    Procedure: SI JOINT INJECTION;  Surgeon: Paul Sanchez MD;  Location: [facility]    M-sedaj by  phys perfrmg svc 5+ yr Right 6/23/2014    Procedure: LUMBAR FACET INJECTION OR MEDIAL BRANCH NERVE BLOCK;  Surgeon: Paul Sanchez MD;  Location: [facility]    M-sedaj by  phys perfrmg svc 5+ yr Right 7/14/2014    Procedure: LUMBAR FACET INJECTION OR MEDIAL BRANCH NERVE BLOCK;  Surgeon: Paul Sanchez MD;  Location: [facility]    Grady localization wire 1 site right (cpt=19281)      Orthopedic surg (pbp)      lumbar disc x2 2010/2012    Other  2011    orif left ankle    Other      AP repair    Other surgical history      bunionectomy    Other surgical history  1993    breast biopsy    Other surgical history      back surgery    Other surgical history  2011    open surgical reduction fracture     Current Outpatient Medications   Medication Sig Dispense Refill    pregabalin 100 MG Oral Cap Take 1 capsule (100 mg total) by mouth 2 (two) times daily. 180 capsule 1    Elastic Bandages & Supports (FUTURO RIGHT HAND WRIST BRACE) Does not apply Misc Wear daily. Full hand/wrist brace. 1 each 0    losartan 50 MG Oral Tab Take 1 tablet (50 mg total) by mouth daily. 90 tablet 3    SYNTHROID 25 MCG Oral Tab Take 1 tablet (25 mcg total) by mouth before breakfast. 90 tablet 3    dilTIAZem  MG Oral Capsule SR 24 Hr Take 1 capsule (240 mg total) by mouth daily. 90 capsule 3    METHOCARBAMOL 750 MG Oral Tab Take 1 tablet (750 mg total) by mouth 3 (three) times daily. 270 tablet 0    Omega-3 1400 MG Oral Cap Take by mouth.      Naproxen Sodium (ALEVE OR) Take 3 tablets by mouth daily.      vitamin E 400 UNITS Oral Cap Take 1 capsule (400 Units total) by mouth daily.       Cholecalciferol (VITAMIN D) 1000 UNITS Oral Cap Take 1 capsule by mouth daily.      Cyanocobalamin (VITAMIN B 12 OR) Take 1,000 Units by mouth daily.      Multiple Vitamin (MULTI-VITAMIN) Oral Tab Take 1 tablet by mouth daily.      acetaminophen (TYLENOL EXTRA STRENGTH) 500 MG Oral Tab Take 2 tablets by mouth every 6 (six) hours as needed for 10 days. 100 tablet 0    magnesium oxide (MAG-OX) 400 MG Oral Tab Take 1 tablet by mouth 2 (two) times daily. Indications: Low Amount of Magnesium in the Blood 60 tablet 1    aspirin 81 MG Oral Tab Take 1 tablet (81 mg total) by mouth daily.       No family history on file.  Social History     Occupational History    Not on file   Tobacco Use    Smoking status: Former     Current packs/day: 0.00     Types: Cigarettes     Start date: 1964     Quit date: 1974     Years since quittin.9    Smokeless tobacco: Never    Tobacco comments:     Updated 24   Vaping Use    Vaping status: Never Used   Substance and Sexual Activity    Alcohol use: No     Comment: beer rarely/ once a year    Drug use: No    Sexual activity: Not on file        Review of Systems:  Negative unless stated in HPI.    Physical Exam:    Ht 5' (1.524 m)   Wt 179 lb (81.2 kg)   BMI 34.96 kg/m²     Bilateral Upper Extremity:   Inspection: Skin Intact. No skin lesions. No gross deformity.   Palpation: TTP over A1 pulley of left middle finger   Motion: Elbow: normal bilateral symmetric ext/flex  Wrist: normal bilateral symmetric ext/flex/sup/pro  Finger: normal in all digits   Special Tests: + active triggering/subtle catch of left middle finger today. No IPJ contracture. Normally sensate digit. Normally perfused digit.       Assessment/Plan:  79 year old female with right Middle finger and Ring finger, left middle finger triggering.    I reviewed the patient's electronic medical record, including the pertinent office notes, medical/surgical history, medications and images. I specifically reviewed the  images noted above, independently and discussed my independent interpretation of these images in combination with the pertinent positive and negative findings in my clinical exam with the patient.    Triggering of right Middle finger and Ring finger -  improved after CSI #1 for each digit, at last visit. Continue to monitor.    2.    Triggering of left middle finger - I discussed with the patient surgical and non-surgical treatment options and their success rate/risks. Using a shared decision-making process, patient elected to proceed with a corticosteroid injection.  CSI #1 was performed today for this digit    Follow Up:  6 weeks, okay to cancel appointment if symptoms are 100% resolved    Procedure:  Written consent was obtained.  Skin was prepped with ChloraPrep.  1 mL of 6 mg betamethasone and 1 mL of 1% lidocaine was injected and split equally into the subcutaneous tissue overlying the A1 pulleys of left middle finger. Patient tolerated the procedure well.  No complications were encountered.  Band-Aid was applied.    Titi Youssef MD   Hand, Wrist, & Elbow Surgery  [email or web address]         [1]   Allergies  Allergen Reactions    Vicodin [Hydrocodone-Acetaminophen] NAUSEA AND VOMITING, DIZZINESS and HALLUCINATION    Tramadol NAUSEA AND VOMITING

## 2024-12-24 NOTE — TELEPHONE ENCOUNTER
Date Morning Midday Evening   12/16/24 1:30 132/75, 10:00 134/72   12/17/24 2:00 121/68, 10:30 130/69   12/18/24 10:30 132/80   12/19/24 12/20/24 11:00 138/87,  9:30 131/67   12/21/24 11:00 130/73   12/22/24 1:30 129/70     Alberto Senior, III 1963    Pt called and has additional questions and would like a call back. . Please advise.

## 2025-02-21 ENCOUNTER — LAB ENCOUNTER (OUTPATIENT)
Dept: LAB | Age: 80
End: 2025-02-21
Attending: FAMILY MEDICINE
Payer: COMMERCIAL

## 2025-02-21 ENCOUNTER — OFFICE VISIT (OUTPATIENT)
Dept: INTERNAL MEDICINE CLINIC | Facility: CLINIC | Age: 80
End: 2025-02-21
Payer: COMMERCIAL

## 2025-02-21 DIAGNOSIS — E11.9 TYPE 2 DIABETES MELLITUS WITHOUT COMPLICATION, WITHOUT LONG-TERM CURRENT USE OF INSULIN (HCC): ICD-10-CM

## 2025-02-21 DIAGNOSIS — E03.9 HYPOTHYROIDISM, UNSPECIFIED TYPE: ICD-10-CM

## 2025-02-21 DIAGNOSIS — Z00.00 ENCOUNTER FOR ANNUAL HEALTH EXAMINATION: Primary | ICD-10-CM

## 2025-02-21 DIAGNOSIS — Z00.00 ANNUAL PHYSICAL EXAM: ICD-10-CM

## 2025-02-21 DIAGNOSIS — Z13.0 SCREENING FOR ENDOCRINE, NUTRITIONAL, METABOLIC AND IMMUNITY DISORDER: ICD-10-CM

## 2025-02-21 DIAGNOSIS — Z13.29 SCREENING FOR ENDOCRINE, NUTRITIONAL, METABOLIC AND IMMUNITY DISORDER: ICD-10-CM

## 2025-02-21 DIAGNOSIS — I10 PRIMARY HYPERTENSION: ICD-10-CM

## 2025-02-21 DIAGNOSIS — Z13.228 SCREENING FOR ENDOCRINE, NUTRITIONAL, METABOLIC AND IMMUNITY DISORDER: ICD-10-CM

## 2025-02-21 DIAGNOSIS — Z13.21 SCREENING FOR ENDOCRINE, NUTRITIONAL, METABOLIC AND IMMUNITY DISORDER: ICD-10-CM

## 2025-02-21 DIAGNOSIS — Z13.220 SCREENING FOR LIPOID DISORDERS: ICD-10-CM

## 2025-02-21 PROBLEM — U07.1 COVID-19 VIRUS INFECTION: Status: RESOLVED | Noted: 2023-04-08 | Resolved: 2025-02-21

## 2025-02-21 LAB
ALBUMIN SERPL-MCNC: 4.5 G/DL (ref 3.2–4.8)
ALBUMIN/GLOB SERPL: 1.6 {RATIO} (ref 1–2)
ALP LIVER SERPL-CCNC: 77 U/L
ALT SERPL-CCNC: 17 U/L
ANION GAP SERPL CALC-SCNC: 11 MMOL/L (ref 0–18)
AST SERPL-CCNC: 23 U/L (ref ?–34)
BASOPHILS # BLD AUTO: 0.03 X10(3) UL (ref 0–0.2)
BASOPHILS NFR BLD AUTO: 0.4 %
BILIRUB SERPL-MCNC: 0.5 MG/DL (ref 0.2–1.1)
BUN BLD-MCNC: 26 MG/DL (ref 9–23)
CALCIUM BLD-MCNC: 11.2 MG/DL (ref 8.7–10.6)
CHLORIDE SERPL-SCNC: 99 MMOL/L (ref 98–112)
CHOLEST SERPL-MCNC: 164 MG/DL (ref ?–200)
CO2 SERPL-SCNC: 29 MMOL/L (ref 21–32)
CREAT BLD-MCNC: 1.47 MG/DL
CREAT UR-SCNC: 65 MG/DL
EGFRCR SERPLBLD CKD-EPI 2021: 36 ML/MIN/1.73M2 (ref 60–?)
EOSINOPHIL # BLD AUTO: 0.06 X10(3) UL (ref 0–0.7)
EOSINOPHIL NFR BLD AUTO: 0.8 %
ERYTHROCYTE [DISTWIDTH] IN BLOOD BY AUTOMATED COUNT: 11.9 %
EST. AVERAGE GLUCOSE BLD GHB EST-MCNC: 200 MG/DL (ref 68–126)
FASTING PATIENT LIPID ANSWER: YES
FASTING STATUS PATIENT QL REPORTED: YES
GLOBULIN PLAS-MCNC: 2.8 G/DL (ref 2–3.5)
GLUCOSE BLD-MCNC: 246 MG/DL (ref 70–99)
HBA1C MFR BLD: 8.6 % (ref ?–5.7)
HCT VFR BLD AUTO: 39.3 %
HDLC SERPL-MCNC: 42 MG/DL (ref 40–59)
HGB BLD-MCNC: 13.5 G/DL
IMM GRANULOCYTES # BLD AUTO: 0.03 X10(3) UL (ref 0–1)
IMM GRANULOCYTES NFR BLD: 0.4 %
LDLC SERPL CALC-MCNC: 92 MG/DL (ref ?–100)
LYMPHOCYTES # BLD AUTO: 2.19 X10(3) UL (ref 1–4)
LYMPHOCYTES NFR BLD AUTO: 29 %
MCH RBC QN AUTO: 30.8 PG (ref 26–34)
MCHC RBC AUTO-ENTMCNC: 34.4 G/DL (ref 31–37)
MCV RBC AUTO: 89.7 FL
MICROALBUMIN UR-MCNC: <0.3 MG/DL
MONOCYTES # BLD AUTO: 0.61 X10(3) UL (ref 0.1–1)
MONOCYTES NFR BLD AUTO: 8.1 %
NEUTROPHILS # BLD AUTO: 4.62 X10 (3) UL (ref 1.5–7.7)
NEUTROPHILS # BLD AUTO: 4.62 X10(3) UL (ref 1.5–7.7)
NEUTROPHILS NFR BLD AUTO: 61.3 %
NONHDLC SERPL-MCNC: 122 MG/DL (ref ?–130)
OSMOLALITY SERPL CALC.SUM OF ELEC: 301 MOSM/KG (ref 275–295)
PLATELET # BLD AUTO: 212 10(3)UL (ref 150–450)
POTASSIUM SERPL-SCNC: 4.7 MMOL/L (ref 3.5–5.1)
PROT SERPL-MCNC: 7.3 G/DL (ref 5.7–8.2)
RBC # BLD AUTO: 4.38 X10(6)UL
SODIUM SERPL-SCNC: 139 MMOL/L (ref 136–145)
T3FREE SERPL-MCNC: 3.21 PG/ML (ref 2.4–4.2)
T4 FREE SERPL-MCNC: 1.3 NG/DL (ref 0.8–1.7)
TRIGL SERPL-MCNC: 173 MG/DL (ref 30–149)
TSI SER-ACNC: 0.23 UIU/ML (ref 0.55–4.78)
VLDLC SERPL CALC-MCNC: 28 MG/DL (ref 0–30)
WBC # BLD AUTO: 7.5 X10(3) UL (ref 4–11)

## 2025-02-21 PROCEDURE — 82043 UR ALBUMIN QUANTITATIVE: CPT

## 2025-02-21 PROCEDURE — 83036 HEMOGLOBIN GLYCOSYLATED A1C: CPT

## 2025-02-21 PROCEDURE — 82570 ASSAY OF URINE CREATININE: CPT

## 2025-02-21 PROCEDURE — 80053 COMPREHEN METABOLIC PANEL: CPT

## 2025-02-21 PROCEDURE — 85025 COMPLETE CBC W/AUTO DIFF WBC: CPT

## 2025-02-21 PROCEDURE — 84481 FREE ASSAY (FT-3): CPT

## 2025-02-21 PROCEDURE — 84443 ASSAY THYROID STIM HORMONE: CPT

## 2025-02-21 PROCEDURE — 36415 COLL VENOUS BLD VENIPUNCTURE: CPT

## 2025-02-21 PROCEDURE — 80061 LIPID PANEL: CPT

## 2025-02-21 PROCEDURE — 84439 ASSAY OF FREE THYROXINE: CPT

## 2025-02-21 NOTE — PROGRESS NOTES
Subjective:   Alicia Barragan is a 79 year old female who presents for a Medicare Subsequent Annual Wellness visit (Pt already had Initial Annual Wellness) and scheduled follow up of multiple significant but stable problems.   1. Encounter for annual health examination (Primary)- doing ok. Is thinking about moving to [place].   2. Type 2 diabetes mellitus without complication, without long-term current use of insulin (HCC)- limiting carbs and sugars.   3. Primary hypertension- taking BP med as prescribed with no issues.   4. Hypothyroidism, unspecified type- taking Synthroid as prescribed with no issues.       History/Other:   Fall Risk Assessment:   She has been screened for Falls and is High Risk. Fall Prevention information provided to patient in After Visit Summary.    Do you feel unsteady when standing or walking?: Yes  Do you worry about falling?: Yes  Have you fallen in the past year?: No     Cognitive Assessment:   She had a completely normal cognitive assessment - see flowsheet entries     Functional Ability/Status:   Alicia Barragan has some abnormal functions as listed below:  She has Vision problems based on screening of functional status. She has Walking problems based on screening of functional status.       Depression Screening (PHQ):  PHQ-2 SCORE: 0  , done 2/21/2025   Last [place] Suicide Screening on 2/21/2025 was No Risk.          Advanced Directives:   She does NOT have a Living Will. [Do you have a living will?: No]  She does NOT have a Power of  for Health Care. [Do you have a healthcare power of ?: No]  Discussed Advance Care Planning with patient (and family/surrogate if present). Standard forms made available to patient in After Visit Summary.      Patient Active Problem List   Diagnosis    Degenerative lumbar spinal stenosis    S/P lumbar laminectomy    Congenital spondylolisthesis    Spondylosis of unspecified site without mention of myelopathy    DM type 2 (diabetes  mellitus, type 2) (HCC)    Hypertension    Hypothyroidism    Valvular disease    IBS (irritable bowel syndrome)    Chronic right SI joint pain    Lumbar spondylosis    Spondylolisthesis of lumbar region    Failed back surgical syndrome    Carpal tunnel syndrome on left     Allergies:  She is allergic to vicodin [hydrocodone-acetaminophen] and tramadol.    Current Medications:  Outpatient Medications Marked as Taking for the 2/21/25 encounter (Office Visit) with Taylor Dupont DO   Medication Sig    pregabalin 100 MG Oral Cap Take 1 capsule (100 mg total) by mouth 2 (two) times daily.    Elastic Bandages & Supports (FUTURO RIGHT HAND WRIST BRACE) Does not apply Misc Wear daily. Full hand/wrist brace.    losartan 50 MG Oral Tab Take 1 tablet (50 mg total) by mouth daily.    SYNTHROID 25 MCG Oral Tab Take 1 tablet (25 mcg total) by mouth before breakfast.    dilTIAZem  MG Oral Capsule SR 24 Hr Take 1 capsule (240 mg total) by mouth daily.    METHOCARBAMOL 750 MG Oral Tab Take 1 tablet (750 mg total) by mouth 3 (three) times daily.    Omega-3 1400 MG Oral Cap Take by mouth.    Naproxen Sodium (ALEVE OR) Take 3 tablets by mouth daily.    vitamin E 400 UNITS Oral Cap Take 1 capsule (400 Units total) by mouth daily.    Cholecalciferol (VITAMIN D) 1000 UNITS Oral Cap Take 1 capsule by mouth daily.    Cyanocobalamin (VITAMIN B 12 OR) Take 1,000 Units by mouth daily.    Multiple Vitamin (MULTI-VITAMIN) Oral Tab Take 1 tablet by mouth daily.    acetaminophen (TYLENOL EXTRA STRENGTH) 500 MG Oral Tab Take 2 tablets by mouth every 6 (six) hours as needed for 10 days.    magnesium oxide (MAG-OX) 400 MG Oral Tab Take 1 tablet by mouth 2 (two) times daily. Indications: Low Amount of Magnesium in the Blood    aspirin 81 MG Oral Tab Take 1 tablet (81 mg total) by mouth daily.       Medical History:  She  has a past medical history of Anal pain (11/7/2013), Anemia (6/18/2013), Back problem, Closed fracture of lateral malleolus  (6/20/2013), Congenital spondylolisthesis (3/1/2013), Degeneration of lumbar or lumbosacral intervertebral disc (6/20/2013), Degenerative lumbar spinal stenosis (1/28/2010), Diabetes ([facility]), Disturbance of skin sensation (6/20/2013), Diverticulosis, DM type 2 (diabetes mellitus, type 2) ([facility]) (6/15/2013), Esophageal reflux, External hemorrhoids with other complication (11/7/2013), Gastrointestinal bleeding (11/7/2013), Hypertension, Hypertension, Hypothyroidism (6/15/2013), Hypothyroidism, IBS (irritable bowel syndrome), Internal hemorrhoids with other complication (11/7/2013), Migraine headache, Muscle weakness, Neuralgia, neuritis, and radiculitis, unspecified (3/1/2013), Obese, Other ill-defined conditions(799.89), Postoperative seroma (3/1/2013), Rectocele (11/7/2013), S/P lumbar laminectomy (3/1/2013), Spinal stenosis, lumbar (3/18/2009), Spondylosis of unspecified site without mention of myelopathy (3/1/2013), Thoracic or lumbosacral neuritis or radiculitis, unspecified (6/20/2013), Type II or unspecified type diabetes mellitus without mention of complication, not stated as uncontrolled, Unspecified closed fracture of ankle (6/20/2013), Unspecified disorder of thyroid, Valvular disease, and Visual impairment.  Surgical History:  She  has a past surgical history that includes other surgical history; other surgical history (1993); other surgical history; injection, w/wo contrast, dx/therapeutic substance, epidural/subarachnoid; lumbar/sacral (3/1/2013); m-sedaj by  phys perfrmg svc 5+ yr (3/1/2013); fluor gid & loclzj ndl/cath spi dx/ther njx (3/1/2013); injection, w/wo contrast, dx/therapeutic substance, epidural/subarachnoid; lumbar/sacral (3/22/2013); m-sedaj by  phys perfrmg svc 5+ yr (3/22/2013); epidurography, radiological s & i (3/22/2013); injection, w/wo contrast, dx/therapeutic substance, epidural/subarachnoid; lumbar/sacral (4/17/2013); [place] [facility] [place]4/17/2013); fluor gid &  loclzj ndl/cath spi dx/ther njx (2013); orthopedic surg (); other (); other; colonoscopy (); hernia surgery (); d & c (); herber localization wire 1 site right (cpt=19281); other surgical history (); ligation of hemorrhoid(s); injection proc for sacroiliac joint arthrography &/or anesthetic/steroid (Right, 2014); m-sedaj by  phys perfrmg svc 5+ yr (Right, 2014); injection proc for sacroiliac joint arthrography &/or anesthetic/steroid (N/A, 2014); m-sedaj by  phys perfrmg svc 5+ yr (N/A, 2014); inject nerv blck,othr periph nerv (Right, 2014); inject nerv blck,othr periph nerv (Right, 2014); inject nerv blck,othr periph nerv (Right, 2014); inject nerv blck,othr periph nerv (Right, 2014); m-sedaj by  phys perfrmg svc 5+ yr (Right, 2014); inject nerv blck,othr periph nerv (Right, 2014); inject nerv blck,othr periph nerv (Right, 2014); inject nerv blck,othr periph nerv (Right, 2014); inject nerv blck,othr periph nerv (Right, 2014); fluoroscopic guidance needle placement (Right, 2014); m-sedaj by  phys perfrmg svc 5+ yr (Right, 2014); and hemorrhoidectomy (17).   Family History:  Her family history is not on file.  Social History:  She  reports that she quit smoking about 51 years ago. Her smoking use included cigarettes. She started smoking about 61 years ago. She has never used smokeless tobacco. She reports that she does not drink alcohol and does not use drugs.    Tobacco:  She smoked tobacco in the past but quit greater than 12 months ago.  Social History     Tobacco Use   Smoking Status Former    Current packs/day: 0.00    Types: Cigarettes    Start date: 1964    Quit date: 1974    Years since quittin.1   Smokeless Tobacco Never   Tobacco Comments    Updated 24          CAGE Alcohol Screen:   CAGE screening score of 0 on 2025, showing low risk of alcohol abuse.      Patient Care  Team:  Taylor Dupont DO as PCP - General (Family Medicine)  Donte Carlos MD as Consulting Physician (NEUROSURGERY)  Marine Rain MD (Anesthesiology)  Celine Harper APRN (Nurse Practitioner)  Jimena Howrad APRN (Nurse Practitioner)  Paulette Sprague PA-C (Physician Assistant)  Keri Gan APRN as Registered Nurse (Nurse Practitioner Family)    Review of Systems  GENERAL: feels well otherwise  SKIN: denies any unusual skin lesions  EYES: denies blurred vision or double vision  HEENT: denies nasal congestion, sinus pain or ST  LUNGS: denies shortness of breath with exertion  CARDIOVASCULAR: denies chest pain on exertion  GI: denies abdominal pain, denies heartburn  : denies dysuria, vaginal discharge or itching, no complaint of urinary incontinence   MUSCULOSKELETAL: denies back pain  NEURO: denies headaches  PSYCHE: denies depression or anxiety  HEMATOLOGIC: denies hx of anemia  ENDOCRINE: denies thyroid history  ALL/ASTHMA: denies hx of allergy or asthma    Objective:   Physical Exam  General Appearance:  Alert, cooperative, no distress, appears stated age   Head:  Normocephalic, without obvious abnormality, atraumatic   Eyes:  PERRL, conjunctiva/corneas clear, EOM's intact both eyes   Ears:  Normal TM's and external ear canals, both ears   Nose: Nares normal, septum midline,mucosa normal, no drainage or sinus tenderness   Throat: Lips, mucosa, and tongue normal; teeth and gums normal   Neck: Supple, symmetrical, trachea midline, no adenopathy;  thyroid: not enlarged, symmetric, no tenderness/mass/nodules; no carotid bruit or JVD   Back:   Symmetric, no curvature, ROM normal, no CVA tenderness   Lungs:   Clear to auscultation bilaterally, respirations unlabored   Heart:  Regular rate and rhythm, S1 and S2 normal, no murmur, rub, or gallop               Pulses: 2+ and symmetric   Skin: Skin color, texture, turgor normal, no rashes or lesions   Lymph nodes: Cervical, supraclavicular, and axillary  nodes normal   Neurologic: Normal       Pulse 90   Temp 96.6 °F (35.9 °C) (Temporal)   Resp 19   Ht 5' (1.524 m)   Wt 180 lb (81.6 kg)   SpO2 98%   BMI 35.15 kg/m²  Estimated body mass index is 35.15 kg/m² as calculated from the following:    Height as of this encounter: 5' (1.524 m).    Weight as of this encounter: 180 lb (81.6 kg).    Medicare Hearing Assessment:   Hearing Screening    Time taken: 2/21/2025  9:10 AM  Entry User: Soco Araiza CMA  Screening Method: Finger Rub  Finger Rub Result: Pass         Visual Acuity:   Right Eye Visual Acuity: Uncorrected Right Eye Chart Acuity: 20/30   Left Eye Visual Acuity: Uncorrected Left Eye Chart Acuity: 20/30   Both Eyes Visual Acuity: Uncorrected Both Eyes Chart Acuity: 20/25   Able To Tolerate Visual Acuity: Yes        Assessment & Plan:   Alicia Barragan is a 79 year old female who presents for a Medicare Assessment.     1. Encounter for annual health examination (Primary)  2. Type 2 diabetes mellitus without complication, without long-term current use of insulin (HCC)  3. Primary hypertension  4. Hypothyroidism, unspecified type  Other orders  -     High Dose Fluzone trivalent influenza, 65 yrs+ PFS [97361]  The patient indicates understanding of these issues and agrees to the plan.  Reinforced healthy diet, lifestyle, and exercise.  1. Encounter for annual health examination (Primary)- Reviewed age-appropriate preventive health and safety recommendations with patient. Check labs. Encouraged regular exercise and healthy eating.   2. Type 2 diabetes mellitus without complication, without long-term current use of insulin (HCC)- check labs. Continue to limit carbs and sugars.   3. Primary hypertension- BP controlled on med. Continue.   4. Hypothyroidism, unspecified type- check TSH. Continue Synthroid.       No follow-ups on file.     Taylor Dupont DO, 2/21/2025     Supplementary Documentation:   General Health:  In the past six months, have you lost  more than 10 pounds without trying?: 2 - No  Has your appetite been poor?: No  Type of Diet: Other  How does the patient maintain a good energy level?: Other  How would you describe your daily physical activity?: None  How would you describe your current health state?: Fair  How do you maintain positive mental well-being?:  (none)  On a scale of 0 to 10, with 0 being no pain and 10 being severe pain, what is your pain level?: 5 - (Moderate)  In the past six months, have you experienced urine leakage?: 0-No  At any time do you feel concerned for the safety/well-being of yourself and/or your children, in your home or elsewhere?: Yes  Have you had any immunizations at another office such as Influenza, Hepatitis B, Tetanus, or Pneumococcal?: No    Health Maintenance   Topic Date Due    COVID-19 Vaccine (3 - 2024-25 season) 09/01/2024    Influenza Vaccine (1) 10/01/2024    HTN: BP Follow-Up  11/28/2024    Annual Depression Screening  01/01/2025    Fall Risk Screening (Annual)  01/01/2025    Diabetes Care: Foot Exam (Annual)  Never done    Diabetes Care: Microalb/Creat Ratio (Annual)  01/01/2025    Diabetes Care: GFR  02/13/2025    Annual Physical  02/16/2025    Diabetes Care A1C  02/27/2025    Diabetes Care Dilated Eye Exam  08/23/2025    Colorectal Cancer Screening  06/03/2026    DEXA Scan  Completed    Pneumococcal Vaccine: 50+ Years  Completed    Zoster Vaccines  Completed    Meningococcal B Vaccine  Aged Out

## 2025-02-22 VITALS
DIASTOLIC BLOOD PRESSURE: 88 MMHG | SYSTOLIC BLOOD PRESSURE: 132 MMHG | OXYGEN SATURATION: 98 % | BODY MASS INDEX: 35.34 KG/M2 | RESPIRATION RATE: 19 BRPM | TEMPERATURE: 97 F | WEIGHT: 180 LBS | HEART RATE: 90 BPM | HEIGHT: 60 IN

## 2025-02-22 PROBLEM — E86.0 DEHYDRATION: Status: RESOLVED | Noted: 2023-04-08 | Resolved: 2025-02-22

## 2025-02-22 PROBLEM — R53.1 WEAKNESS GENERALIZED: Status: RESOLVED | Noted: 2023-04-08 | Resolved: 2025-02-22

## 2025-02-22 PROBLEM — E87.6 HYPOKALEMIA: Status: RESOLVED | Noted: 2023-04-08 | Resolved: 2025-02-22

## 2025-02-22 PROBLEM — E83.42 HYPOMAGNESEMIA: Status: RESOLVED | Noted: 2023-04-08 | Resolved: 2025-02-22

## 2025-02-23 DIAGNOSIS — G47.00 INSOMNIA, UNSPECIFIED TYPE: ICD-10-CM

## 2025-02-25 ENCOUNTER — OFFICE VISIT (OUTPATIENT)
Dept: INTERNAL MEDICINE CLINIC | Facility: CLINIC | Age: 80
End: 2025-02-25
Payer: COMMERCIAL

## 2025-02-25 VITALS
HEIGHT: 61 IN | DIASTOLIC BLOOD PRESSURE: 78 MMHG | TEMPERATURE: 99 F | BODY MASS INDEX: 34.21 KG/M2 | WEIGHT: 181.19 LBS | HEART RATE: 90 BPM | OXYGEN SATURATION: 99 % | RESPIRATION RATE: 18 BRPM | SYSTOLIC BLOOD PRESSURE: 134 MMHG

## 2025-02-25 DIAGNOSIS — E03.9 HYPOTHYROIDISM, UNSPECIFIED TYPE: ICD-10-CM

## 2025-02-25 DIAGNOSIS — E83.52 HYPERCALCEMIA: ICD-10-CM

## 2025-02-25 DIAGNOSIS — R79.89 ELEVATED SERUM CREATININE: ICD-10-CM

## 2025-02-25 DIAGNOSIS — E11.65 TYPE 2 DIABETES MELLITUS WITH HYPERGLYCEMIA, WITHOUT LONG-TERM CURRENT USE OF INSULIN (HCC): Primary | ICD-10-CM

## 2025-02-25 PROCEDURE — 99214 OFFICE O/P EST MOD 30 MIN: CPT | Performed by: FAMILY MEDICINE

## 2025-02-25 RX ORDER — SEMAGLUTIDE 0.68 MG/ML
0.25 INJECTION, SOLUTION SUBCUTANEOUS WEEKLY
Qty: 1 EACH | Refills: 0 | Status: SHIPPED | OUTPATIENT
Start: 2025-02-25

## 2025-02-26 RX ORDER — TRIAMTERENE AND HYDROCHLOROTHIAZIDE 37.5; 25 MG/1; MG/1
1 TABLET ORAL DAILY
Qty: 90 TABLET | Refills: 0 | OUTPATIENT
Start: 2025-02-26

## 2025-02-26 NOTE — TELEPHONE ENCOUNTER
Creating separate telephone encounter for triamterene-hydrochlorothiazide.     Medication refill request needs to be clarified with patient first.

## 2025-02-26 NOTE — TELEPHONE ENCOUNTER
Dear nursing staff,    Please call patient and verify if she requested a refill for triamterene-hydrochlorothiazide.    Patient reported not taking on 2/16/2024 and no recent dispense history.

## 2025-02-26 NOTE — TELEPHONE ENCOUNTER
Please kindly review this medication    [x] FAILS PROTOCOL    [] HAS NO PROTOCOL ATTACHED    Recent fill dates: 11/9/24  Date of  last written prescription: 8/2/24   Last written quantity: #180 each and processed as a 90 day supply  [] Takes as needed  [x] Takes scheduled twice daily

## 2025-02-27 RX ORDER — PREGABALIN 100 MG/1
100 CAPSULE ORAL 2 TIMES DAILY
Qty: 180 CAPSULE | Refills: 0 | Status: SHIPPED | OUTPATIENT
Start: 2025-02-27

## 2025-02-27 NOTE — TELEPHONE ENCOUNTER
RN to pt call at home and cell numbers listed- unable to leave detailed VM on unidentified mailbox at either line.  Asked pt to callback office and ask to speak to a nurse.  Pt is not active on  (last logged in 4/2023).

## 2025-02-28 NOTE — TELEPHONE ENCOUNTER
Please kindly review this medication    [x] FAILS PROTOCOL    [] HAS NO PROTOCOL ATTACHED    Dr. Dupont - please advise    Per patient call taken by clinical nursing staff 2/28/25:  Patient returned call. States she did request medication. She only takes it as needed for swelling

## 2025-02-28 NOTE — TELEPHONE ENCOUNTER
Dear nursing staff,    Please attempt to call patient once more.    Patient's sister Ellen and patient's son Darren are on [place]LA[place]. If unable to reach patient, please try alternative contacts. Thank you!

## 2025-02-28 NOTE — TELEPHONE ENCOUNTER
Patient returned call. States she did request medication. She only takes it as needed for swelling    Central refill - patient did request refill and takes as needed

## 2025-03-01 RX ORDER — TRIAMTERENE AND HYDROCHLOROTHIAZIDE 37.5; 25 MG/1; MG/1
1 TABLET ORAL DAILY
Qty: 90 TABLET | Refills: 1 | Status: SHIPPED | OUTPATIENT
Start: 2025-03-01

## 2025-03-01 NOTE — PROGRESS NOTES
Subjective:   Patient ID: Alicia Barragan is a 79 year old female.    HPI Here for f/u on labs. Patient had previously been on Metformin but had side effects.   Taking thyroid med as prescribed with no issues.     History/Other:   Past Medical History:    Anal pain    Anemia    Post-op     Back problem    Closed fracture of lateral malleolus    Log Date: 09/27/2011     Congenital spondylolisthesis    Degeneration of lumbar or lumbosacral intervertebral disc    Log Date: 03/05/2012     Degenerative lumbar spinal stenosis    Diabetes (HCC)    Disturbance of skin sensation    Log Date: 06/26/2012     Diverticulosis    DM type 2 (diabetes mellitus, type 2) ([facility])    Esophageal reflux    External hemorrhoids with other complication    with bleeding    Gastrointestinal bleeding    Hypertension    Hypertension    Hypothyroidism    Hypothyroidism    IBS (irritable bowel syndrome)    Internal hemorrhoids with other complication    prolapsed    Migraine headache    Muscle weakness    Neuralgia, neuritis, and radiculitis, unspecified    Obese    Other ill-defined conditions(799.89)    thyroid     Postoperative seroma    Rectocele    S/P lumbar laminectomy    Spinal stenosis, lumbar    Spondylosis of unspecified site without mention of myelopathy    Thoracic or lumbosacral neuritis or radiculitis, unspecified    Log Date: 06/26/2012     Type II or unspecified type diabetes mellitus without mention of complication, not stated as uncontrolled    Unspecified closed fracture of ankle    Log Date: 10/10/2011     Unspecified disorder of thyroid    Valvular disease    MVP    Visual impairment    glasses       Review of Systems   Respiratory:  Negative for chest tightness and shortness of breath.    Cardiovascular:  Negative for chest pain and palpitations.   Neurological:  Negative for dizziness and headaches.     Current Outpatient Medications   Medication Sig Dispense Refill    semaglutide (OZEMPIC, 0.25 OR 0.5 MG/DOSE,) 2 MG/3ML  Subcutaneous Solution Pen-injector Inject 0.25 mg into the skin once a week. 1 each 0    Elastic Bandages & Supports (FUTURO RIGHT HAND WRIST BRACE) Does not apply Misc Wear daily. Full hand/wrist brace. 1 each 0    losartan 50 MG Oral Tab Take 1 tablet (50 mg total) by mouth daily. 90 tablet 3    SYNTHROID 25 MCG Oral Tab Take 1 tablet (25 mcg total) by mouth before breakfast. 90 tablet 3    dilTIAZem  MG Oral Capsule SR 24 Hr Take 1 capsule (240 mg total) by mouth daily. 90 capsule 3    METHOCARBAMOL 750 MG Oral Tab Take 1 tablet (750 mg total) by mouth 3 (three) times daily. 270 tablet 0    Omega-3 1400 MG Oral Cap Take by mouth.      Naproxen Sodium (ALEVE OR) Take 3 tablets by mouth daily.      vitamin E 400 UNITS Oral Cap Take 1 capsule (400 Units total) by mouth daily.      Cholecalciferol (VITAMIN D) 1000 UNITS Oral Cap Take 1 capsule by mouth daily.      Cyanocobalamin (VITAMIN B 12 OR) Take 1,000 Units by mouth daily.      Multiple Vitamin (MULTI-VITAMIN) Oral Tab Take 1 tablet by mouth daily.      acetaminophen (TYLENOL EXTRA STRENGTH) 500 MG Oral Tab Take 2 tablets by mouth every 6 (six) hours as needed for 10 days. 100 tablet 0    magnesium oxide (MAG-OX) 400 MG Oral Tab Take 1 tablet by mouth 2 (two) times daily. Indications: Low Amount of Magnesium in the Blood 60 tablet 1    aspirin 81 MG Oral Tab Take 1 tablet (81 mg total) by mouth daily.      Triamterene-HCTZ 37.5-25 MG Oral Tab Take 1 tablet by mouth daily. 90 tablet 1    pregabalin 100 MG Oral Cap Take 1 capsule (100 mg total) by mouth 2 (two) times daily. 180 capsule 0     Allergies:Allergies[1]    Objective:   Physical Exam  Vitals reviewed.   Constitutional:       Appearance: Normal appearance. She is well-developed.   HENT:      Head: Normocephalic and atraumatic.   Pulmonary:      Effort: Pulmonary effort is normal.   Neurological:      Mental Status: She is alert.   Psychiatric:         Mood and Affect: Mood normal.         Behavior:  Behavior normal.         Assessment & Plan:   1. Type 2 diabetes mellitus with hyperglycemia, without long-term current use of insulin (HCC)    2. Hypercalcemia    3. Elevated serum creatinine    4. Hypothyroidism, unspecified type    A1c not at goal. Discussed options. Start Ozempic. Discussed risks/benefits/potential side effects and proper use of medication. F/u in one month.   Recheck.  Recheck.   TSH low, T4 normal. Recheck lab.     Orders Placed This Encounter   Procedures    Comp Metabolic Panel (14)    TSH W Reflex To Free T4    Hemoglobin A1C       Meds This Visit:  Requested Prescriptions     Signed Prescriptions Disp Refills    semaglutide (OZEMPIC, 0.25 OR 0.5 MG/DOSE,) 2 MG/3ML Subcutaneous Solution Pen-injector 1 each 0     Sig: Inject 0.25 mg into the skin once a week.       Imaging & Referrals:  None         [1]   Allergies  Allergen Reactions    Vicodin [Hydrocodone-Acetaminophen] NAUSEA AND VOMITING, DIZZINESS and HALLUCINATION    Tramadol NAUSEA AND VOMITING

## 2025-03-10 ENCOUNTER — TELEPHONE (OUTPATIENT)
Dept: ORTHOPEDICS CLINIC | Facility: CLINIC | Age: 80
End: 2025-03-10

## 2025-03-10 NOTE — TELEPHONE ENCOUNTER
Lvm for patient to call back, appointment on 3/11 needs to be rescheduled to 3/17 or 3/18 due to surgery

## 2025-03-17 ENCOUNTER — OFFICE VISIT (OUTPATIENT)
Dept: ORTHOPEDICS CLINIC | Facility: CLINIC | Age: 80
End: 2025-03-17
Payer: COMMERCIAL

## 2025-03-17 DIAGNOSIS — M65.341 TRIGGER RING FINGER OF RIGHT HAND: ICD-10-CM

## 2025-03-17 DIAGNOSIS — M65.332 TRIGGER MIDDLE FINGER OF LEFT HAND: ICD-10-CM

## 2025-03-17 DIAGNOSIS — M65.331 TRIGGER MIDDLE FINGER OF RIGHT HAND: Primary | ICD-10-CM

## 2025-03-17 PROCEDURE — 99213 OFFICE O/P EST LOW 20 MIN: CPT | Performed by: STUDENT IN AN ORGANIZED HEALTH CARE EDUCATION/TRAINING PROGRAM

## 2025-03-17 NOTE — PROGRESS NOTES
Clinic Note     Allergies[1]    CC: right Middle and ring finger triggering, left middle finger triggering    HPI:     From prior visit 11/4/24:  79 year old RHD female presents with triggering of the above. Ongoing for several months. Right middle and ring finger frequently lock up and get stuck, and she has to pry them open. Left middle finger symptoms are much milder and it does not lock up as often.    Previous treatments: none    From prior visit 12/17/24:  Prior CSI for right middle and ring fingers worked well. Now interested in left middle finger CSI as symptoms have not improved.    Today's visit 3/17/25:  Alicia returns overall doing well; she received CSI#1 to right middle and ring fingers on 11/4, and CSI#1 to left middle finger on 12/17/24. Left middle finger is overall much improved. Right middle and ring fingers had been doing well, but she feels as though her symptoms of painful triggering are slowly returning for those fingers (although not as frequently as before).     Past Medical History:    Anal pain    Anemia    Post-op     Back problem    Closed fracture of lateral malleolus    Log Date: 09/27/2011     Congenital spondylolisthesis    Degeneration of lumbar or lumbosacral intervertebral disc    Log Date: 03/05/2012     Degenerative lumbar spinal stenosis    Diabetes (HCC)    Disturbance of skin sensation    Log Date: 06/26/2012     Diverticulosis    DM type 2 (diabetes mellitus, type 2) (HCC)    Esophageal reflux    External hemorrhoids with other complication    with bleeding    Gastrointestinal bleeding    Hypertension    Hypertension    Hypothyroidism    Hypothyroidism    IBS (irritable bowel syndrome)    Internal hemorrhoids with other complication    prolapsed    Migraine headache    Muscle weakness    Neuralgia, neuritis, and radiculitis, unspecified    Obese    Other ill-defined conditions(799.89)    thyroid     Postoperative seroma    Rectocele    S/P lumbar laminectomy    Spinal  stenosis, lumbar    Spondylosis of unspecified site without mention of myelopathy    Thoracic or lumbosacral neuritis or radiculitis, unspecified    Log Date: 06/26/2012     Type II or unspecified type diabetes mellitus without mention of complication, not stated as uncontrolled    Unspecified closed fracture of ankle    Log Date: 10/10/2011     Unspecified disorder of thyroid    Valvular disease    MVP    Visual impairment    glasses     Past Surgical History:   Procedure Laterality Date    Colonoscopy  2010    D & c  1975    Epidurography, radiological s & i  3/22/2013    Procedure: CAUDAL;  Surgeon: Larry Lowry MD;  Location: [facility]    Fluor gid & loclzj ndl/cath spi dx/ther njx  3/1/2013    Procedure: CAUDAL;  Surgeon: Paul Sanchez MD;  Location: [facility]    Fluor gid & loclzj ndl/cath spi dx/ther njx  4/17/2013    Procedure: CAUDAL;  Surgeon: Paul Sanchez MD;  Location: [facility]    Fluoroscopic guidance needle placement Right 7/14/2014    Procedure: LUMBAR FACET INJECTION OR MEDIAL BRANCH NERVE BLOCK;  Surgeon: Paul Sanchez MD;  Location: [facility]    Hemorrhoidectomy  1/18/17    transanal excision of anal canal mass    Hernia surgery  2011    hernia repair    Inject nerv blck,othr periph nerv Right 6/23/2014    Procedure: LUMBAR FACET INJECTION OR MEDIAL BRANCH NERVE BLOCK;  Surgeon: Paul Sanchez MD;  Location: [facility]    Inject nerv blck,othr periph nerv Right 6/23/2014    Procedure: LUMBAR FACET INJECTION OR MEDIAL BRANCH NERVE BLOCK;  Surgeon: Paul Sanchez MD;  Location: [facility]    Inject nerv blck,othr periph nerv Right 6/23/2014    Procedure: LUMBAR FACET INJECTION OR MEDIAL BRANCH NERVE BLOCK;  Surgeon: Paul Sanchez MD;  Location: [facility]    Inject nerv blck,othr periph nerv Right 6/23/2014    Procedure: LUMBAR FACET INJECTION  OR MEDIAL BRANCH NERVE BLOCK;  Surgeon: Paul Sanchez MD;  [facility]    Inject nerv blck,othr periph nerv Right 7/14/2014    Procedure: LUMBAR FACET INJECTION OR MEDIAL BRANCH NERVE BLOCK;  Surgeon: Paul Sanchez MD;  Location: [facility]    Inject nerv blck,othr periph nerv Right 7/14/2014    Procedure: LUMBAR FACET INJECTION OR MEDIAL BRANCH NERVE BLOCK;  Surgeon: Pual Sanchez MD;  Location: [facility]    Inject nerv blck,othr periph nerv Right 7/14/2014    Procedure: LUMBAR FACET INJECTION OR MEDIAL BRANCH NERVE BLOCK;  Surgeon: Paul Sanchez MD;  [place] [facility]    Inject nerv blck,othr periph nerv Right 7/14/2014    Procedure: LUMBAR FACET INJECTION OR MEDIAL BRANCH NERVE BLOCK;  Surgeon: Paul Sanchez MD;  Location: [facility]    Injection proc for sacroiliac joint arthrography &/or anesthetic/steroid Right 5/16/2014    Procedure: SI JOINT INJECTION;  Surgeon: Paul Sanchez MD;  Location: [facility]    Injection proc for sacroiliac joint arthrography &/or anesthetic/steroid N/A 6/9/2014    Procedure: SI JOINT INJECTION;  Surgeon: Paul Sanchez MD;  Location: [facility]    Injection, w/wo contrast, dx/therapeutic substance, epidural/subarachnoid; lumbar/sacral  3/1/2013    Procedure: CAUDAL;  Surgeon: Paul Sanchez MD;  Location: [facility]    Injection, w/wo contrast, dx/therapeutic substance, epidural/subarachnoid; lumbar/sacral  3/22/2013    Procedure: CAUDAL;  Surgeon: Larry Lowry MD;  Location: [facility]    Injection, w/wo contrast, dx/therapeutic substance, epidural/subarachnoid; lumbar/sacral  4/17/2013    Procedure: CAUDAL;  Surgeon: Paul Sanchez MD;  Location: [facility]    Ligation of hemorrhoid(s)      x4    M-sedaj by  mau perfrmg svc 5+ yr  3/1/2013     Procedure: CAUDAL;  Surgeon: Paul Sanchez MD;  [place] [facility]    M-sedaj by  phys perfrmg svc 5+ yr  3/22/2013    Procedure: CAUDAL;  Surgeon: Larry Lowry MD;  Location: [facility]    M-sedaj by  phys perfrmg svc 5+ yr  4/17/2013    Procedure: CAUDAL;  Surgeon: Paul Sanchez MD;  Location: [facility]    M-sedaj by  phys perfrmg svc 5+ yr Right 5/16/2014    Procedure: SI JOINT INJECTION;  Surgeon: Paul Sanchez MD;  Location: [facility]    M-sedaj by  phys perfrmg svc 5+ yr N/A 6/9/2014    Procedure: SI JOINT INJECTION;  Surgeon: Paul Sanchez MD;  Location: [facility]    M-sedaj by  phys perfrmg svc 5+ yr Right 6/23/2014    Procedure: LUMBAR FACET INJECTION OR MEDIAL BRANCH NERVE BLOCK;  Surgeon: Paul Sanchez MD;  Location: [facility]    M-sedaj by  phys perfrmg svc 5+ yr Right 7/14/2014    Procedure: LUMBAR FACET INJECTION OR MEDIAL BRANCH NERVE BLOCK;  Surgeon: Paul Sanchez MD;  Location: [facility]    Grady localization wire 1 site right (cpt=19281)      Orthopedic surg (pbp)      lumbar disc x2 2010/2012    Other  2011    orif left ankle    Other      AP repair    Other surgical history      bunionectomy    Other surgical history  1993    breast biopsy    Other surgical history      back surgery    Other surgical history  2011    open surgical reduction fracture     Current Outpatient Medications   Medication Sig Dispense Refill    Triamterene-HCTZ 37.5-25 MG Oral Tab Take 1 tablet by mouth daily. 90 tablet 1    pregabalin 100 MG Oral Cap Take 1 capsule (100 mg total) by mouth 2 (two) times daily. 180 capsule 0    semaglutide (OZEMPIC, 0.25 OR 0.5 MG/DOSE,) 2 MG/3ML Subcutaneous Solution Pen-injector Inject 0.25 mg into the skin once a week. 1 each 0    Elastic Bandages & Supports (FUTURO RIGHT HAND WRIST BRACE) Does not apply Misc Wear  daily. Full hand/wrist brace. 1 each 0    losartan 50 MG Oral Tab Take 1 tablet (50 mg total) by mouth daily. 90 tablet 3    SYNTHROID 25 MCG Oral Tab Take 1 tablet (25 mcg total) by mouth before breakfast. 90 tablet 3    dilTIAZem  MG Oral Capsule SR 24 Hr Take 1 capsule (240 mg total) by mouth daily. 90 capsule 3    METHOCARBAMOL 750 MG Oral Tab Take 1 tablet (750 mg total) by mouth 3 (three) times daily. 270 tablet 0    Omega-3 1400 MG Oral Cap Take by mouth.      Naproxen Sodium (ALEVE OR) Take 3 tablets by mouth daily.      vitamin E 400 UNITS Oral Cap Take 1 capsule (400 Units total) by mouth daily.      Cholecalciferol (VITAMIN D) 1000 UNITS Oral Cap Take 1 capsule by mouth daily.      Cyanocobalamin (VITAMIN B 12 OR) Take 1,000 Units by mouth daily.      Multiple Vitamin (MULTI-VITAMIN) Oral Tab Take 1 tablet by mouth daily.      acetaminophen (TYLENOL EXTRA STRENGTH) 500 MG Oral Tab Take 2 tablets by mouth every 6 (six) hours as needed for 10 days. 100 tablet 0    magnesium oxide (MAG-OX) 400 MG Oral Tab Take 1 tablet by mouth 2 (two) times daily. Indications: Low Amount of Magnesium in the Blood 60 tablet 1    aspirin 81 MG Oral Tab Take 1 tablet (81 mg total) by mouth daily.       No family history on file.  Social History     Occupational History    Not on file   Tobacco Use    Smoking status: Former     Current packs/day: 0.00     Types: Cigarettes     Start date: 1964     Quit date: 1974     Years since quittin.2    Smokeless tobacco: Never    Tobacco comments:     Updated 24   Vaping Use    Vaping status: Never Used   Substance and Sexual Activity    Alcohol use: No     Comment: beer rarely/ once a year    Drug use: No    Sexual activity: Not on file        Review of Systems:  Negative unless stated in HPI.    Physical Exam:      Bilateral Upper Extremity:   Inspection: Skin Intact. No skin lesions. No gross deformity.   Palpation: TTP over A1 pulley of right middle finger only  today, mild   Motion: Elbow: normal bilateral symmetric ext/flex  Wrist: normal bilateral symmetric ext/flex/sup/pro  Finger: normal in all digits   Special Tests: No active triggering/subtle catch of any digits today. No IPJ contracture. Normally sensate digit. Normally perfused digit.       Assessment/Plan:  79 year old female with right Middle finger and Ring finger, left middle finger triggering.    I reviewed the patient's electronic medical record, including the pertinent office notes, medical/surgical history, medications and images. I specifically reviewed the images noted above, independently and discussed my independent interpretation of these images in combination with the pertinent positive and negative findings in my clinical exam with the patient.    Triggering of right Middle finger - largely improved after CSI #1, however symptoms may be slowly returning. Options discussed at length today, and Alicia would like to defer a repeat injection and continue observation for now. She will return to see me if symptoms persist or worsen.    Trigger of right Ring finger -  improved after CSI #1. Continue to monitor.    Triggering of left middle finger - improved after CSI #1. Continue to monitor.    Follow Up:  4-6 weeks, okay to cancel appointment if symptoms are 100% resolved      iTti Youssef MD   Hand, Wrist, & Elbow Surgery  [email or web address]         [1]   Allergies  Allergen Reactions    Vicodin [Hydrocodone-Acetaminophen] NAUSEA AND VOMITING, DIZZINESS and HALLUCINATION    Tramadol NAUSEA AND VOMITING

## 2025-03-25 ENCOUNTER — LAB ENCOUNTER (OUTPATIENT)
Dept: LAB | Age: 80
End: 2025-03-25
Attending: FAMILY MEDICINE
Payer: COMMERCIAL

## 2025-03-25 ENCOUNTER — OFFICE VISIT (OUTPATIENT)
Dept: INTERNAL MEDICINE CLINIC | Facility: CLINIC | Age: 80
End: 2025-03-25
Payer: COMMERCIAL

## 2025-03-25 VITALS
WEIGHT: 169.75 LBS | RESPIRATION RATE: 16 BRPM | HEART RATE: 73 BPM | TEMPERATURE: 99 F | OXYGEN SATURATION: 97 % | SYSTOLIC BLOOD PRESSURE: 122 MMHG | HEIGHT: 62 IN | BODY MASS INDEX: 31.24 KG/M2 | DIASTOLIC BLOOD PRESSURE: 84 MMHG

## 2025-03-25 DIAGNOSIS — E03.9 HYPOTHYROIDISM, UNSPECIFIED TYPE: ICD-10-CM

## 2025-03-25 DIAGNOSIS — E83.52 HYPERCALCEMIA: ICD-10-CM

## 2025-03-25 DIAGNOSIS — E11.65 TYPE 2 DIABETES MELLITUS WITH HYPERGLYCEMIA, WITHOUT LONG-TERM CURRENT USE OF INSULIN (HCC): Primary | ICD-10-CM

## 2025-03-25 DIAGNOSIS — E11.65 TYPE 2 DIABETES MELLITUS WITH HYPERGLYCEMIA, WITHOUT LONG-TERM CURRENT USE OF INSULIN (HCC): ICD-10-CM

## 2025-03-25 DIAGNOSIS — R79.89 ELEVATED SERUM CREATININE: ICD-10-CM

## 2025-03-25 LAB
ALBUMIN SERPL-MCNC: 4.4 G/DL (ref 3.2–4.8)
ALBUMIN/GLOB SERPL: 1.5 {RATIO} (ref 1–2)
ALP LIVER SERPL-CCNC: 63 U/L
ALT SERPL-CCNC: 23 U/L
ANION GAP SERPL CALC-SCNC: 12 MMOL/L (ref 0–18)
AST SERPL-CCNC: 29 U/L (ref ?–34)
BILIRUB SERPL-MCNC: 0.8 MG/DL (ref 0.2–1.1)
BUN BLD-MCNC: 24 MG/DL (ref 9–23)
CALCIUM BLD-MCNC: 11.5 MG/DL (ref 8.7–10.6)
CHLORIDE SERPL-SCNC: 103 MMOL/L (ref 98–112)
CO2 SERPL-SCNC: 26 MMOL/L (ref 21–32)
CREAT BLD-MCNC: 1.31 MG/DL
EGFRCR SERPLBLD CKD-EPI 2021: 41 ML/MIN/1.73M2 (ref 60–?)
EST. AVERAGE GLUCOSE BLD GHB EST-MCNC: 180 MG/DL (ref 68–126)
FASTING STATUS PATIENT QL REPORTED: YES
GLOBULIN PLAS-MCNC: 3 G/DL (ref 2–3.5)
GLUCOSE BLD-MCNC: 152 MG/DL (ref 70–99)
HBA1C MFR BLD: 7.9 % (ref ?–5.7)
OSMOLALITY SERPL CALC.SUM OF ELEC: 299 MOSM/KG (ref 275–295)
POTASSIUM SERPL-SCNC: 3.6 MMOL/L (ref 3.5–5.1)
PROT SERPL-MCNC: 7.4 G/DL (ref 5.7–8.2)
SODIUM SERPL-SCNC: 141 MMOL/L (ref 136–145)
T3FREE SERPL-MCNC: 3.46 PG/ML (ref 2.4–4.2)
T4 FREE SERPL-MCNC: 1.5 NG/DL (ref 0.8–1.7)
TSI SER-ACNC: 0.1 UIU/ML (ref 0.55–4.78)

## 2025-03-25 PROCEDURE — 84439 ASSAY OF FREE THYROXINE: CPT

## 2025-03-25 PROCEDURE — 36415 COLL VENOUS BLD VENIPUNCTURE: CPT

## 2025-03-25 PROCEDURE — 83036 HEMOGLOBIN GLYCOSYLATED A1C: CPT

## 2025-03-25 PROCEDURE — 84481 FREE ASSAY (FT-3): CPT

## 2025-03-25 PROCEDURE — 84443 ASSAY THYROID STIM HORMONE: CPT

## 2025-03-25 PROCEDURE — 99214 OFFICE O/P EST MOD 30 MIN: CPT | Performed by: FAMILY MEDICINE

## 2025-03-25 PROCEDURE — 80053 COMPREHEN METABOLIC PANEL: CPT

## 2025-03-25 NOTE — PROGRESS NOTES
Subjective:   Patient ID: Alicia Barragan is a 79 year old female.    HPI  Here for f/u. Patient is taking Ozempic as prescribed with no issues. Tolerating well. Has cut down on sugar. No hypoglycemic symptoms.     History/Other:   Review of Systems   Cardiovascular:  Negative for chest pain and palpitations.   Gastrointestinal:  Negative for abdominal pain, constipation, diarrhea and nausea.   Neurological:  Negative for dizziness and light-headedness.     Current Outpatient Medications   Medication Sig Dispense Refill    Triamterene-HCTZ 37.5-25 MG Oral Tab Take 1 tablet by mouth daily. 90 tablet 1    pregabalin 100 MG Oral Cap Take 1 capsule (100 mg total) by mouth 2 (two) times daily. 180 capsule 0    semaglutide (OZEMPIC, 0.25 OR 0.5 MG/DOSE,) 2 MG/3ML Subcutaneous Solution Pen-injector Inject 0.25 mg into the skin once a week. 1 each 0    Elastic Bandages & Supports (FUTURO RIGHT HAND WRIST BRACE) Does not apply Misc Wear daily. Full hand/wrist brace. 1 each 0    losartan 50 MG Oral Tab Take 1 tablet (50 mg total) by mouth daily. 90 tablet 3    SYNTHROID 25 MCG Oral Tab Take 1 tablet (25 mcg total) by mouth before breakfast. 90 tablet 3    dilTIAZem  MG Oral Capsule SR 24 Hr Take 1 capsule (240 mg total) by mouth daily. 90 capsule 3    METHOCARBAMOL 750 MG Oral Tab Take 1 tablet (750 mg total) by mouth 3 (three) times daily. 270 tablet 0    Omega-3 1400 MG Oral Cap Take by mouth.      vitamin E 400 UNITS Oral Cap Take 1 capsule (400 Units total) by mouth daily.      Multiple Vitamin (MULTI-VITAMIN) Oral Tab Take 1 tablet by mouth daily.      magnesium oxide (MAG-OX) 400 MG Oral Tab Take 1 tablet by mouth 2 (two) times daily. Indications: Low Amount of Magnesium in the Blood 60 tablet 1    Naproxen Sodium (ALEVE OR) Take 3 tablets by mouth daily. (Patient not taking: Reported on 3/25/2025)      Cholecalciferol (VITAMIN D) 1000 UNITS Oral Cap Take 1 capsule by mouth daily. (Patient not taking: Reported on  3/25/2025)      Cyanocobalamin (VITAMIN B 12 OR) Take 1,000 Units by mouth daily. (Patient not taking: Reported on 3/25/2025)      acetaminophen (TYLENOL EXTRA STRENGTH) 500 MG Oral Tab Take 2 tablets by mouth every 6 (six) hours as needed for 10 days. (Patient not taking: Reported on 3/25/2025) 100 tablet 0    aspirin 81 MG Oral Tab Take 1 tablet (81 mg total) by mouth daily. (Patient not taking: Reported on 3/25/2025)       Allergies:Allergies[1]    Objective:   Physical Exam  Vitals reviewed.   Constitutional:       Appearance: Normal appearance. She is well-developed.   HENT:      Head: Normocephalic and atraumatic.   Pulmonary:      Effort: Pulmonary effort is normal.   Neurological:      Mental Status: She is alert.   Psychiatric:         Mood and Affect: Mood normal.         Behavior: Behavior normal.       Bilateral barefoot skin diabetic exam is normal, visualized feet and the appearance is normal.  Bilateral monofilament/sensation of both feet is normal.  Pulsation pedal pulse exam of both lower legs/feet is normal as well.        Assessment & Plan:   1. Type 2 diabetes mellitus with hyperglycemia, without long-term current use of insulin (HCC)    2. Hypothyroidism, unspecified type    Tolerating Ozempic. Continue 0.25mg for one more dose and will increase depending on A1c.   Check lab.     No orders of the defined types were placed in this encounter.      Meds This Visit:  Requested Prescriptions      No prescriptions requested or ordered in this encounter       Imaging & Referrals:  None         [1]   Allergies  Allergen Reactions    Vicodin [Hydrocodone-Acetaminophen] NAUSEA AND VOMITING, DIZZINESS and HALLUCINATION    Tramadol NAUSEA AND VOMITING

## 2025-03-28 DIAGNOSIS — E11.65 TYPE 2 DIABETES MELLITUS WITH HYPERGLYCEMIA, WITHOUT LONG-TERM CURRENT USE OF INSULIN (HCC): ICD-10-CM

## 2025-03-28 DIAGNOSIS — E03.9 HYPOTHYROIDISM, UNSPECIFIED TYPE: ICD-10-CM

## 2025-03-28 DIAGNOSIS — E83.52 HYPERCALCEMIA: Primary | ICD-10-CM

## 2025-04-08 RX ORDER — DILTIAZEM HYDROCHLORIDE 240 MG/1
240 CAPSULE, COATED, EXTENDED RELEASE ORAL DAILY
Qty: 90 CAPSULE | Refills: 3 | Status: SHIPPED | OUTPATIENT
Start: 2025-04-08

## 2025-04-08 NOTE — TELEPHONE ENCOUNTER
Please Review. Protocol Failed; No Protocol     Requested Prescriptions   Pending Prescriptions Disp Refills    DILTIAZEM  MG Oral Capsule SR 24 Hr [Pharmacy Med Name: Diltiazem Hydrochloride Er 24hr 240 Mg Cap Julita] 90 capsule 0     Sig: Take 1 capsule (240 mg total) by mouth daily.       Hypertension Medications Protocol Failed - 4/8/2025 11:27 AM        Failed - EGFRCR or GFRNAA > 50     GFR Evaluation  EGFRCR: 41 , resulted on 3/25/2025

## 2025-04-09 NOTE — TELEPHONE ENCOUNTER
Please Review. Protocol Failed; No Protocol     Requested Prescriptions   Pending Prescriptions Disp Refills    SYNTHROID 25 MCG Oral Tab [Pharmacy Med Name: Synthroid 25 Mcg Tab Abbv] 90 tablet 0     Sig: Take 1 tablet (25 mcg total) by mouth before breakfast.       Thyroid Medication Protocol Failed - 4/9/2025  3:44 PM        Failed - Last TSH value is normal     Lab Results   Component Value Date    TSH 0.103 (L) 03/25/2025

## 2025-04-10 RX ORDER — LEVOTHYROXINE SODIUM 25 MCG
25 TABLET ORAL
Qty: 90 TABLET | Refills: 3 | Status: SHIPPED | OUTPATIENT
Start: 2025-04-10

## 2025-04-21 ENCOUNTER — TELEPHONE (OUTPATIENT)
Age: 80
End: 2025-04-21

## 2025-04-21 NOTE — TELEPHONE ENCOUNTER
Dear nursing staff,    Please call patient and verify how she is taking her Ozempic.    Patient states that she is out of insulin pen needles that came inside her Ozempic box.    The box provided 6 needles which is the correct amount for patient doing 4 doses of the 0.25 mg =1 mg and 2 doses of the 0.5 mg = 1 mg. The Ozempic box is for a total of 2 mg.    Concerned as patient states there is still more medication in the pen and she wants to use it up.    Need to know if patient is to be continuing the 0.25 mg dose or if patient is supposed to increase to the 0.5 mg.    BD Krissy Ultra Fine pen needles 32G X 4 MM are the sizes that come inside the Ozempic box.    New pen needle prescription pended for #4

## 2025-04-21 NOTE — TELEPHONE ENCOUNTER
Patient is requesting more needles for the Ozempic medication. She stated they only provide you with 6 needles but there is still more medication in the \"device/container\" that she wants to use up. She is not sure of the size or brand.

## 2025-04-22 RX ORDER — PEN NEEDLE, DIABETIC 32GX 5/32"
NEEDLE, DISPOSABLE MISCELLANEOUS
Qty: 12 EACH | Refills: 0 | Status: SHIPPED | OUTPATIENT
Start: 2025-04-22

## 2025-04-22 NOTE — TELEPHONE ENCOUNTER
Dr. Dupont: Additional needles pended and patient requesting refill for Ozempic 0.5 mg weekly  Spoke with patient she has been taking 0.25 mg of Ozempic for the last 6 weeks and she is due for another dose on Sunday. Since she has been taking 0.25 mg weekly for the past 6 weeks (1.5 mg total of the 2 mg dispensed) she has run out of needles but she still has 0.5 mg left.    Per patient will be going to 0.5 mg on Sunday per Dr. Dupont's recommendation    Patient states she will be losing her work insurance on 4/30/25 (she will have medicare after that) so she will get requested repeat lab work done before then. Patient states she is still tolerating the Ozempic well, she is unable to provide an updated weight.

## 2025-04-25 NOTE — TELEPHONE ENCOUNTER
Alicia Barragan requesting Medication Refill for:    Medication name and dose (copy and paste from medication list):    Disp Refills Start End    lidocaine 5 % External Patch (Discontinued) -- --  7/2/2019    Sig - Route: Place 2 patches onto the skin daily. - Transdermal    Class: Historical        If medication is not on medication list - transfer patient to RN queue for triage    Preferred Pharmacy: [facility] - [address] [phone], [phone]     LOV: 3/25/2025   Last Refill date: 7/2/2019  Next Scheduled appointment: No future appointments.

## 2025-04-30 RX ORDER — LIDOCAINE 50 MG/G
3 PATCH TOPICAL DAILY
Qty: 270 PATCH | Refills: 1 | Status: SHIPPED | OUTPATIENT
Start: 2025-04-30

## 2025-05-22 ENCOUNTER — LAB ENCOUNTER (OUTPATIENT)
Dept: LAB | Age: 80
End: 2025-05-22
Attending: FAMILY MEDICINE
Payer: MEDICARE

## 2025-05-22 DIAGNOSIS — E83.52 HYPERCALCEMIA: ICD-10-CM

## 2025-05-22 DIAGNOSIS — E03.9 HYPOTHYROIDISM, UNSPECIFIED TYPE: ICD-10-CM

## 2025-05-22 DIAGNOSIS — E11.65 TYPE 2 DIABETES MELLITUS WITH HYPERGLYCEMIA, WITHOUT LONG-TERM CURRENT USE OF INSULIN (HCC): ICD-10-CM

## 2025-05-22 LAB
EST. AVERAGE GLUCOSE BLD GHB EST-MCNC: 148 MG/DL (ref 68–126)
HBA1C MFR BLD: 6.8 % (ref ?–5.7)
PTH-INTACT SERPL-MCNC: 30.6 PG/ML (ref 18.5–88)
T3FREE SERPL-MCNC: 3.16 PG/ML (ref 2.4–4.2)
T4 FREE SERPL-MCNC: 1.3 NG/DL (ref 0.8–1.7)
TSI SER-ACNC: 0.33 UIU/ML (ref 0.55–4.78)

## 2025-05-22 PROCEDURE — 83970 ASSAY OF PARATHORMONE: CPT

## 2025-05-22 PROCEDURE — 84481 FREE ASSAY (FT-3): CPT

## 2025-05-22 PROCEDURE — 36415 COLL VENOUS BLD VENIPUNCTURE: CPT

## 2025-05-22 PROCEDURE — 84443 ASSAY THYROID STIM HORMONE: CPT

## 2025-05-22 PROCEDURE — 83036 HEMOGLOBIN GLYCOSYLATED A1C: CPT

## 2025-05-22 PROCEDURE — 82330 ASSAY OF CALCIUM: CPT

## 2025-05-22 PROCEDURE — 84439 ASSAY OF FREE THYROXINE: CPT

## 2025-05-22 RX ORDER — LOSARTAN POTASSIUM 50 MG/1
50 TABLET ORAL DAILY
Qty: 90 TABLET | Refills: 0 | Status: SHIPPED | OUTPATIENT
Start: 2025-05-22

## 2025-05-23 LAB — LC CALCIUM, IONIZED: 5.6 MG/DL

## 2025-06-06 ENCOUNTER — OFFICE VISIT (OUTPATIENT)
Age: 80
End: 2025-06-06
Payer: MEDICARE

## 2025-06-06 VITALS
DIASTOLIC BLOOD PRESSURE: 72 MMHG | HEART RATE: 71 BPM | SYSTOLIC BLOOD PRESSURE: 120 MMHG | RESPIRATION RATE: 16 BRPM | TEMPERATURE: 98 F | WEIGHT: 164.81 LBS | OXYGEN SATURATION: 95 % | HEIGHT: 62 IN | BODY MASS INDEX: 30.33 KG/M2

## 2025-06-06 DIAGNOSIS — E05.90 SUBCLINICAL HYPERTHYROIDISM: Primary | ICD-10-CM

## 2025-06-06 DIAGNOSIS — E11.9 TYPE 2 DIABETES MELLITUS WITHOUT COMPLICATION, WITHOUT LONG-TERM CURRENT USE OF INSULIN (HCC): ICD-10-CM

## 2025-06-06 DIAGNOSIS — E66.811 CLASS 1 OBESITY DUE TO EXCESS CALORIES WITH SERIOUS COMORBIDITY AND BODY MASS INDEX (BMI) OF 30.0 TO 30.9 IN ADULT: ICD-10-CM

## 2025-06-06 DIAGNOSIS — E66.09 CLASS 1 OBESITY DUE TO EXCESS CALORIES WITH SERIOUS COMORBIDITY AND BODY MASS INDEX (BMI) OF 30.0 TO 30.9 IN ADULT: ICD-10-CM

## 2025-06-06 PROCEDURE — 99214 OFFICE O/P EST MOD 30 MIN: CPT | Performed by: FAMILY MEDICINE

## 2025-06-06 PROCEDURE — G2211 COMPLEX E/M VISIT ADD ON: HCPCS | Performed by: FAMILY MEDICINE

## 2025-06-11 NOTE — PROGRESS NOTES
Subjective:   Patient ID: Alicia Barragan is a 79 year old female.    HPI Here for f/u on labs. Patient has not been taking Synthroid for months per patient. No symptoms.   Taking diabetes meds as prescribed with no issues. Has been successfully losing weight as well.     History/Other:   Past Medical History[1]    Review of Systems   Constitutional:  Negative for chills and fever.   Respiratory:  Negative for chest tightness and shortness of breath.    Cardiovascular:  Negative for chest pain and palpitations.     Current Medications[2]  Allergies:Allergies[3]    Objective:   Physical Exam  Vitals reviewed.   Constitutional:       Appearance: Normal appearance. She is well-developed.   HENT:      Head: Normocephalic and atraumatic.   Pulmonary:      Effort: Pulmonary effort is normal.   Neurological:      Mental Status: She is alert.   Psychiatric:         Mood and Affect: Mood normal.         Behavior: Behavior normal.         Assessment & Plan:   1. Subclinical hyperthyroidism    2. Type 2 diabetes mellitus without complication, without long-term current use of insulin (HCC)    3. Class 1 obesity due to excess calories with serious comorbidity and body mass index (BMI) of 30.0 to 30.9 in adult    TSH low off Synthroid for months. Endo referral placed.   A1c at goal. Continue current meds.   Losing weight on Ozempic. Continue.     No orders of the defined types were placed in this encounter.      Meds This Visit:  Requested Prescriptions      No prescriptions requested or ordered in this encounter       Imaging & Referrals:  ENDOCRINOLOGY - INTERNAL         [1]   Past Medical History:   Anal pain    Anemia    Post-op     Back problem    Closed fracture of lateral malleolus    Log Date: 09/27/2011     Congenital spondylolisthesis    Degeneration of lumbar or lumbosacral intervertebral disc    Log Date: 03/05/2012     Degenerative lumbar spinal stenosis    Diabetes (HCC)    Disturbance of skin sensation    Log Date:  06/26/2012     Diverticulosis    DM type 2 (diabetes mellitus, type 2) ([facility])    Esophageal reflux    External hemorrhoids with other complication    with bleeding    Gastrointestinal bleeding    Hypertension    Hypertension    Hypothyroidism    Hypothyroidism    IBS (irritable bowel syndrome)    Internal hemorrhoids with other complication    prolapsed    Migraine headache    Muscle weakness    Neuralgia, neuritis, and radiculitis, unspecified    Obese    Other ill-defined conditions(799.89)    thyroid     Postoperative seroma    Rectocele    S/P lumbar laminectomy    Spinal stenosis, lumbar    Spondylosis of unspecified site without mention of myelopathy    Thoracic or lumbosacral neuritis or radiculitis, unspecified    Log Date: 06/26/2012     Type II or unspecified type diabetes mellitus without mention of complication, not stated as uncontrolled    Unspecified closed fracture of ankle    Log Date: 10/10/2011     Unspecified disorder of thyroid    Valvular disease    MVP    Visual impairment    glasses   [2]   Current Outpatient Medications   Medication Sig Dispense Refill    losartan 50 MG Oral Tab Take 1 tablet (50 mg total) by mouth daily. 90 tablet 0    lidocaine 5 % External Patch Place 3 patches onto the skin daily. 270 patch 1    Insulin Pen Needle (BD PEN NEEDLE CHUCHO 2ND GEN) 32G X 4 MM Does not apply Misc Use 1 pen needle as directed. 12 each 0    dilTIAZem  MG Oral Capsule SR 24 Hr Take 1 capsule (240 mg total) by mouth daily. 90 capsule 3    Triamterene-HCTZ 37.5-25 MG Oral Tab Take 1 tablet by mouth daily. 90 tablet 1    pregabalin 100 MG Oral Cap Take 1 capsule (100 mg total) by mouth 2 (two) times daily. 180 capsule 0    semaglutide (OZEMPIC, 0.25 OR 0.5 MG/DOSE,) 2 MG/3ML Subcutaneous Solution Pen-injector Inject 0.25 mg into the skin once a week. 1 each 0    Elastic Bandages & Supports (FUTURO RIGHT HAND WRIST BRACE) Does not apply Misc Wear daily. Full hand/wrist brace. 1 each 0     METHOCARBAMOL 750 MG Oral Tab Take 1 tablet (750 mg total) by mouth 3 (three) times daily. 270 tablet 0    Omega-3 1400 MG Oral Cap Take by mouth.      vitamin E 400 UNITS Oral Cap Take 1 capsule (400 Units total) by mouth daily.      Multiple Vitamin (MULTI-VITAMIN) Oral Tab Take 1 tablet by mouth daily.      magnesium oxide (MAG-OX) 400 MG Oral Tab Take 1 tablet by mouth 2 (two) times daily. Indications: Low Amount of Magnesium in the Blood 60 tablet 1   [3]   Allergies  Allergen Reactions    Vicodin [Hydrocodone-Acetaminophen] NAUSEA AND VOMITING, DIZZINESS and HALLUCINATION    Tramadol NAUSEA AND VOMITING

## 2025-06-16 DIAGNOSIS — G47.00 INSOMNIA, UNSPECIFIED TYPE: ICD-10-CM

## 2025-06-18 RX ORDER — PREGABALIN 100 MG/1
100 CAPSULE ORAL 2 TIMES DAILY
Qty: 180 CAPSULE | Refills: 0 | Status: SHIPPED | OUTPATIENT
Start: 2025-06-18

## 2025-06-18 NOTE — TELEPHONE ENCOUNTER
Please review. Protocol Failed; No Protocol      Recent fills: 2/27/2025  Last Rx written: 2/27/2025  Last office visit: 6/6/2025

## 2025-07-17 DIAGNOSIS — E11.65 TYPE 2 DIABETES MELLITUS WITH HYPERGLYCEMIA, WITHOUT LONG-TERM CURRENT USE OF INSULIN (HCC): ICD-10-CM

## 2025-07-17 NOTE — TELEPHONE ENCOUNTER
Alicia Barragan requesting Medication Refill for:    Medication name and dose (copy and paste from medication list)   Medication Quantity Refills Start End   Triamterene-HCTZ 37.5-25 MG Oral Tab 90 tablet 1 3/1/2025 --   Sig:   Take 1 tablet by mouth daily.     Route:   Oral     Order #:   [id number]         Medication Quantity Refills Start End   semaglutide (OZEMPIC, 0.25 OR 0.5 MG/DOSE,) 2 MG/3ML Subcutaneous Solution Pen-injector 1 each 0 2/25/2025 --   Sig:   Inject 0.25 mg into the skin once a week.     Route:   Subcutaneous     Order #:   [id number]       Preferred Pharmacy: [facility] - [facility] [address] [phone], [phone]     LOV: @LOLA [email or web address]@  Last Refill date:   Next Scheduled appointment: No future appointments.

## 2025-07-22 ENCOUNTER — OFFICE VISIT (OUTPATIENT)
Age: 80
End: 2025-07-22
Payer: MEDICARE

## 2025-07-22 VITALS
RESPIRATION RATE: 16 BRPM | OXYGEN SATURATION: 98 % | HEART RATE: 66 BPM | TEMPERATURE: 98 F | DIASTOLIC BLOOD PRESSURE: 60 MMHG | WEIGHT: 143.19 LBS | SYSTOLIC BLOOD PRESSURE: 120 MMHG | HEIGHT: 58 IN | BODY MASS INDEX: 30.06 KG/M2

## 2025-07-22 DIAGNOSIS — M48.061 FORAMINAL STENOSIS OF LUMBAR REGION: ICD-10-CM

## 2025-07-22 DIAGNOSIS — M96.1 FAILED BACK SYNDROME OF LUMBAR SPINE: Primary | ICD-10-CM

## 2025-07-22 DIAGNOSIS — M54.16 LUMBAR RADICULOPATHY: ICD-10-CM

## 2025-07-22 PROCEDURE — 99214 OFFICE O/P EST MOD 30 MIN: CPT | Performed by: FAMILY MEDICINE

## 2025-07-22 PROCEDURE — G2211 COMPLEX E/M VISIT ADD ON: HCPCS | Performed by: FAMILY MEDICINE

## 2025-07-22 RX ORDER — SEMAGLUTIDE 0.68 MG/ML
0.25 INJECTION, SOLUTION SUBCUTANEOUS WEEKLY
Qty: 9 ML | Refills: 0 | Status: SHIPPED | OUTPATIENT
Start: 2025-07-22

## 2025-07-22 RX ORDER — TRIAMTERENE AND HYDROCHLOROTHIAZIDE 37.5; 25 MG/1; MG/1
1 TABLET ORAL DAILY
Qty: 90 TABLET | Refills: 1 | Status: SHIPPED | OUTPATIENT
Start: 2025-07-22

## 2025-07-22 NOTE — TELEPHONE ENCOUNTER
Please Review. Protocol Failed; No Protocol     Requested Prescriptions   Pending Prescriptions Disp Refills    semaglutide (OZEMPIC, 0.25 OR 0.5 MG/DOSE,) 2 MG/3ML Subcutaneous Solution Pen-injector 1 each 0     Sig: Inject 0.25 mg into the skin once a week.       Diabetes Medication Protocol Failed - 7/22/2025 10:01 AM        Failed - EGFRCR or GFRNAA > 50     GFR Evaluation  EGFRCR: 41 , resulted on 3/25/2025

## 2025-07-22 NOTE — PROGRESS NOTES
Subjective:   Patient ID: Alicia Barragan is a 79 year old female.    HPI Here with c/o low back pain that started 5 days ago. Long h/o low back pain and has had surgery on this. Was seeing Pain for steroid injections and now that she has retired and is moving more, notices pain has worsened again. Notes pain medication doesn't help.     History/Other:   Review of Systems   Musculoskeletal:  Positive for back pain. Negative for neck pain.   Neurological:  Negative for weakness and numbness.     Current Medications[1]  Allergies:Allergies[2]    Objective:   Physical Exam  Vitals reviewed.   Constitutional:       Appearance: Normal appearance. She is well-developed.   HENT:      Head: Normocephalic and atraumatic.   Pulmonary:      Effort: Pulmonary effort is normal.   Neurological:      Mental Status: She is alert.   Psychiatric:         Mood and Affect: Mood normal.         Behavior: Behavior normal.         Assessment & Plan:   1. Failed back syndrome of lumbar spine    2. Foraminal stenosis of lumbar region    3. Lumbar radiculopathy    1-3. Reviewed MRI from 2022. Recheck MRI due to new pain. Referral to Physiatry. Offered oral meds, heat, massage- patient notes none of these help.     No orders of the defined types were placed in this encounter.      Meds This Visit:  Requested Prescriptions      No prescriptions requested or ordered in this encounter       Imaging & Referrals:  PHYSIATRY - INTERNAL  MRI SPINE LUMBAR (CPT=72148)         [1]   Current Outpatient Medications   Medication Sig Dispense Refill    pregabalin 100 MG Oral Cap Take 1 capsule (100 mg total) by mouth 2 (two) times daily. 180 capsule 0    losartan 50 MG Oral Tab Take 1 tablet (50 mg total) by mouth daily. 90 tablet 0    lidocaine 5 % External Patch Place 3 patches onto the skin daily. 270 patch 1    Insulin Pen Needle (BD PEN NEEDLE CHUCHO 2ND GEN) 32G X 4 MM Does not apply Misc Use 1 pen needle as directed. 12 each 0    dilTIAZem  MG  Oral Capsule SR 24 Hr Take 1 capsule (240 mg total) by mouth daily. 90 capsule 3    Triamterene-HCTZ 37.5-25 MG Oral Tab Take 1 tablet by mouth daily. 90 tablet 1    semaglutide (OZEMPIC, 0.25 OR 0.5 MG/DOSE,) 2 MG/3ML Subcutaneous Solution Pen-injector Inject 0.25 mg into the skin once a week. 1 each 0    Elastic Bandages & Supports (FUTURO RIGHT HAND WRIST BRACE) Does not apply Misc Wear daily. Full hand/wrist brace. 1 each 0    Omega-3 1400 MG Oral Cap Take by mouth.      vitamin E 400 UNITS Oral Cap Take 1 capsule (400 Units total) by mouth daily.      Multiple Vitamin (MULTI-VITAMIN) Oral Tab Take 1 tablet by mouth daily.      magnesium oxide (MAG-OX) 400 MG Oral Tab Take 1 tablet by mouth 2 (two) times daily. Indications: Low Amount of Magnesium in the Blood 60 tablet 1   [2]   Allergies  Allergen Reactions    Vicodin [Hydrocodone-Acetaminophen] NAUSEA AND VOMITING, DIZZINESS and HALLUCINATION    Tramadol NAUSEA AND VOMITING

## 2025-08-29 ENCOUNTER — OFFICE VISIT (OUTPATIENT)
Dept: PHYSICAL MEDICINE AND REHAB | Facility: CLINIC | Age: 80
End: 2025-08-29

## 2025-08-29 ENCOUNTER — HOSPITAL ENCOUNTER (OUTPATIENT)
Dept: GENERAL RADIOLOGY | Age: 80
Discharge: HOME | End: 2025-08-29
Attending: PHYSICAL MEDICINE & REHABILITATION

## 2025-08-29 VITALS — WEIGHT: 143 LBS | HEIGHT: 58 IN | RESPIRATION RATE: 14 BRPM | BODY MASS INDEX: 30.02 KG/M2

## 2025-08-29 DIAGNOSIS — M79.18 RIGHT BUTTOCK PAIN: ICD-10-CM

## 2025-08-29 DIAGNOSIS — M54.16 RIGHT LUMBAR RADICULITIS: Primary | ICD-10-CM

## 2025-08-29 PROCEDURE — 99204 OFFICE O/P NEW MOD 45 MIN: CPT | Performed by: PHYSICAL MEDICINE & REHABILITATION

## 2025-08-29 PROCEDURE — 73502 X-RAY EXAM HIP UNI 2-3 VIEWS: CPT | Performed by: PHYSICAL MEDICINE & REHABILITATION

## 2025-08-29 RX ORDER — METHYLPREDNISOLONE 4 MG/1
TABLET ORAL
Qty: 1 EACH | Refills: 0 | Status: SHIPPED | OUTPATIENT
Start: 2025-08-29

## (undated) DEVICE — SUTURE SILK 2-0 FSL

## (undated) DEVICE — DRAPE C-ARM UNIVERSAL

## (undated) DEVICE — GOWN,SIRUS,FABRIC-REINFORCED,LARGE: Brand: MEDLINE

## (undated) DEVICE — WIRELESS EXTERNAL NEUROSTIMULATOR

## (undated) DEVICE — EXTERNAL NEUROSTIMULATOR BOOT

## (undated) DEVICE — NEEDLE SPINAL 22X5 405148

## (undated) DEVICE — Device

## (undated) DEVICE — GLOVE SURG SENSICARE SZ 7-1/2

## (undated) DEVICE — GLOVE SURG SENSICARE SZ 7

## (undated) DEVICE — REMOVER DURAPREP 3M

## (undated) DEVICE — PAIN TRAY: Brand: MEDLINE INDUSTRIES, INC.

## (undated) DEVICE — 3M(TM) TEGADERM(TM) TRANSPARENT FILM DRESSING FRAME STYLE 1628: Brand: 3M™ TEGADERM™

## (undated) DEVICE — BANDAID COVERLET 1X3

## (undated) DEVICE — SHEET, T, LAPAROTOMY, STERILE: Brand: MEDLINE

## (undated) DEVICE — SCRUB EZ BETADINE 245

## (undated) DEVICE — ACCESSORY KIT

## (undated) DEVICE — SYRINGE 10ML LL CONTRL SYRINGE

## (undated) DEVICE — GLOVE SURG SENSICARE SZ 6-1/2

## (undated) DEVICE — 3M™ TEGADERM™ TRANSPARENT FILM DRESSING, 1626W, 4 IN X 4-3/4 IN (10 CM X 12 CM), 50 EACH/CARTON, 4 CARTON/CASE: Brand: 3M™ TEGADERM™

## (undated) DEVICE — INJEX BUMPY ANCHOR

## (undated) NOTE — Clinical Note
2017    Patient: David Chapman  : 1945 Visit date: 3/2/2017    Dear  Dr. Yamilex Goncalves MD,    Thank you for referring David Chapman to my practice. Please find my assessment and plan below.         Assessment   External prolapsed hemorrhoids  (p Mohsen Vu MD          Collected:           01/18/2017 10:50 AM             Ordering Location:     [facility] Surgery    Received:            01/18/2017 11:07 AM            Pathologist:          Ernie Gomes MD

## (undated) NOTE — LETTER
09/17/19        [address] [facility] [address]      Dear Ray Bridges,    [address] [facility] records indicate that you have outstanding lab work and or testing that was ordered for you and has not yet been completed:  Orders Placed This Encounter

## (undated) NOTE — ED AVS SNAPSHOT
Ms. Silvia Kahn   MRN: [id number]    Department:  [facility] Emergency Department   Date of Visit:  12/3/2019           Disclosure     Insurance plans vary and the physician(s) referred by the ER may not be covered by your plan.  Please contact tell this physician (or your personal doctor if your instructions are to return to your personal doctor) about any new or lasting problems. The primary care or specialist physician will see patients referred from the [facility] Emergency Department.  Jt Schwab

## (undated) NOTE — LETTER
10/11/2024    Dear Alicia Barragan,      My office staff has attempted to contact you at my request.  It is important for your health and well-being that you contact my office for the following reason(s):      X___ Schedule office visit/office procedure    ___ Schedule laboratory/radiology testing    ___ Call to discuss results of testing and/or be advised of treatment changes      ___      Other:        I believe that the relationship between a physician and their patient is one of communication and mutual cooperation.  It is important for the patient to follow through with the recommendations made by their Doctor in order to achieve the best possible outcome. Please contact our office at [phone].      Sincerely,      [facility]                Taylor Dupont, DO

## (undated) NOTE — MR AVS SNAPSHOT
[facility] [place] General Surgery  [address] [address] [phone]               Thank you for choosing us for your health care visit with Mi Fortune MD.  We are glad to serve you and happy to provide you with this summary of you or follow-up. I will refer her back to Dr. Dominic Franks for future colonoscopy. I have no further follow-up scheduled with this patient at this time. This patient can see me on an as-needed basis.   This patient should return urgently for any problems or Take 1 tablet by mouth daily. omeprazole 20 MG Cpdr   Take 20 mg by mouth as needed. Commonly known as:  PRILOSEC           PEPCID COMPLETE OR   Take by mouth daily. PROBIOTIC OR   Take  by mouth daily.            psyllium 28 % Pack

## (undated) NOTE — ED AVS SNAPSHOT
Ms. Arley Smith   MRN: [id number]    Department:  [facility] Emergency Department   Date of Visit:  1/28/2019           Disclosure     Insurance plans vary and the physician(s) referred by the ER may not be covered by your plan.  Please contact tell this physician (or your personal doctor if your instructions are to return to your personal doctor) about any new or lasting problems. The primary care or specialist physician will see patients referred from the [facility] Emergency Department.  Misael Rajan